# Patient Record
Sex: MALE | Race: WHITE | NOT HISPANIC OR LATINO | Employment: PART TIME | URBAN - METROPOLITAN AREA
[De-identification: names, ages, dates, MRNs, and addresses within clinical notes are randomized per-mention and may not be internally consistent; named-entity substitution may affect disease eponyms.]

---

## 2017-01-09 ENCOUNTER — APPOINTMENT (OUTPATIENT)
Dept: URGENT CARE | Age: 42
End: 2017-01-09

## 2017-08-23 ENCOUNTER — TRANSCRIBE ORDERS (OUTPATIENT)
Dept: LAB | Facility: CLINIC | Age: 42
End: 2017-08-23

## 2017-08-23 ENCOUNTER — APPOINTMENT (OUTPATIENT)
Dept: LAB | Facility: CLINIC | Age: 42
End: 2017-08-23
Payer: COMMERCIAL

## 2017-08-23 DIAGNOSIS — Z00.8 HEALTH EXAMINATION IN POPULATION SURVEY: Primary | ICD-10-CM

## 2017-08-23 DIAGNOSIS — Z00.8 HEALTH EXAMINATION IN POPULATION SURVEY: ICD-10-CM

## 2017-08-23 LAB
CHOLEST SERPL-MCNC: 135 MG/DL (ref 50–200)
EST. AVERAGE GLUCOSE BLD GHB EST-MCNC: 111 MG/DL
HBA1C MFR BLD: 5.5 % (ref 4.2–6.3)
HDLC SERPL-MCNC: 26 MG/DL (ref 40–60)
LDLC SERPL CALC-MCNC: 57 MG/DL (ref 0–100)
TRIGL SERPL-MCNC: 259 MG/DL

## 2017-08-23 PROCEDURE — 83036 HEMOGLOBIN GLYCOSYLATED A1C: CPT

## 2017-08-23 PROCEDURE — 36415 COLL VENOUS BLD VENIPUNCTURE: CPT

## 2017-08-23 PROCEDURE — 80061 LIPID PANEL: CPT

## 2018-03-02 ENCOUNTER — TRANSCRIBE ORDERS (OUTPATIENT)
Dept: RADIOLOGY | Facility: HOSPITAL | Age: 43
End: 2018-03-02

## 2018-03-02 ENCOUNTER — TRANSCRIBE ORDERS (OUTPATIENT)
Dept: LAB | Facility: HOSPITAL | Age: 43
End: 2018-03-02

## 2018-03-02 ENCOUNTER — APPOINTMENT (OUTPATIENT)
Dept: LAB | Facility: HOSPITAL | Age: 43
End: 2018-03-02
Payer: COMMERCIAL

## 2018-03-02 ENCOUNTER — HOSPITAL ENCOUNTER (OUTPATIENT)
Dept: RADIOLOGY | Facility: HOSPITAL | Age: 43
Discharge: HOME | End: 2018-03-02
Payer: COMMERCIAL

## 2018-03-02 DIAGNOSIS — I10 ESSENTIAL HYPERTENSION, MALIGNANT: ICD-10-CM

## 2018-03-02 DIAGNOSIS — R05.9 COUGH: Primary | ICD-10-CM

## 2018-03-02 DIAGNOSIS — J40 BRONCHITIS: ICD-10-CM

## 2018-03-02 DIAGNOSIS — I10 ESSENTIAL HYPERTENSION, MALIGNANT: Primary | ICD-10-CM

## 2018-03-02 DIAGNOSIS — E78.00 PURE HYPERCHOLESTEROLEMIA: ICD-10-CM

## 2018-03-02 LAB
ALBUMIN SERPL BCP-MCNC: 3.9 G/DL (ref 3.5–5)
ALP SERPL-CCNC: 84 U/L (ref 46–116)
ALT SERPL W P-5'-P-CCNC: 29 U/L (ref 12–78)
ANION GAP SERPL CALCULATED.3IONS-SCNC: 7 MMOL/L (ref 4–13)
AST SERPL W P-5'-P-CCNC: 15 U/L (ref 5–45)
BASOPHILS # BLD AUTO: 0.03 THOUSANDS/ΜL (ref 0–0.1)
BASOPHILS NFR BLD AUTO: 1 % (ref 0–1)
BILIRUB SERPL-MCNC: 0.37 MG/DL (ref 0.2–1)
BILIRUB UR QL STRIP: NEGATIVE
BUN SERPL-MCNC: 16 MG/DL (ref 5–25)
CALCIUM SERPL-MCNC: 9 MG/DL (ref 8.3–10.1)
CHLORIDE SERPL-SCNC: 107 MMOL/L (ref 100–108)
CHOLEST SERPL-MCNC: 170 MG/DL (ref 50–200)
CLARITY UR: CLEAR
CO2 SERPL-SCNC: 28 MMOL/L (ref 21–32)
COLOR UR: YELLOW
CREAT SERPL-MCNC: 1.04 MG/DL (ref 0.6–1.3)
EOSINOPHIL # BLD AUTO: 0.33 THOUSAND/ΜL (ref 0–0.61)
EOSINOPHIL NFR BLD AUTO: 5 % (ref 0–6)
ERYTHROCYTE [DISTWIDTH] IN BLOOD BY AUTOMATED COUNT: 12.3 % (ref 11.6–15.1)
ERYTHROCYTE [SEDIMENTATION RATE] IN BLOOD: 13 MM/HOUR (ref 0–10)
GFR SERPL CREATININE-BSD FRML MDRD: 89 ML/MIN/1.73SQ M
GLUCOSE P FAST SERPL-MCNC: 90 MG/DL (ref 65–99)
GLUCOSE UR STRIP-MCNC: NEGATIVE MG/DL
HCT VFR BLD AUTO: 40.9 % (ref 36.5–49.3)
HDLC SERPL-MCNC: 27 MG/DL (ref 40–60)
HGB BLD-MCNC: 14.5 G/DL (ref 12–17)
HGB UR QL STRIP.AUTO: NEGATIVE
KETONES UR STRIP-MCNC: NEGATIVE MG/DL
LDH SERPL-CCNC: 185 U/L (ref 81–234)
LDLC SERPL CALC-MCNC: 93 MG/DL (ref 0–100)
LEUKOCYTE ESTERASE UR QL STRIP: NEGATIVE
LYMPHOCYTES # BLD AUTO: 2.36 THOUSANDS/ΜL (ref 0.6–4.47)
LYMPHOCYTES NFR BLD AUTO: 39 % (ref 14–44)
MCH RBC QN AUTO: 31.3 PG (ref 26.8–34.3)
MCHC RBC AUTO-ENTMCNC: 35.5 G/DL (ref 31.4–37.4)
MCV RBC AUTO: 88 FL (ref 82–98)
MONOCYTES # BLD AUTO: 0.56 THOUSAND/ΜL (ref 0.17–1.22)
MONOCYTES NFR BLD AUTO: 9 % (ref 4–12)
NEUTROPHILS # BLD AUTO: 2.81 THOUSANDS/ΜL (ref 1.85–7.62)
NEUTS SEG NFR BLD AUTO: 46 % (ref 43–75)
NITRITE UR QL STRIP: NEGATIVE
NRBC BLD AUTO-RTO: 0 /100 WBCS
PH UR STRIP.AUTO: 7 [PH] (ref 4.5–8)
PLATELET # BLD AUTO: 246 THOUSANDS/UL (ref 149–390)
PMV BLD AUTO: 10 FL (ref 8.9–12.7)
POTASSIUM SERPL-SCNC: 4.1 MMOL/L (ref 3.5–5.3)
PROT SERPL-MCNC: 7.7 G/DL (ref 6.4–8.2)
PROT UR STRIP-MCNC: NEGATIVE MG/DL
PSA SERPL-MCNC: 0.2 NG/ML (ref 0–4)
RBC # BLD AUTO: 4.63 MILLION/UL (ref 3.88–5.62)
SODIUM SERPL-SCNC: 142 MMOL/L (ref 136–145)
SP GR UR STRIP.AUTO: 1.02 (ref 1–1.03)
TRIGL SERPL-MCNC: 248 MG/DL
TSH SERPL DL<=0.05 MIU/L-ACNC: 2.72 UIU/ML (ref 0.36–3.74)
UROBILINOGEN UR QL STRIP.AUTO: 0.2 E.U./DL
WBC # BLD AUTO: 6.11 THOUSAND/UL (ref 4.31–10.16)

## 2018-03-02 PROCEDURE — 85025 COMPLETE CBC W/AUTO DIFF WBC: CPT

## 2018-03-02 PROCEDURE — 86618 LYME DISEASE ANTIBODY: CPT

## 2018-03-02 PROCEDURE — 80061 LIPID PANEL: CPT

## 2018-03-02 PROCEDURE — 80053 COMPREHEN METABOLIC PANEL: CPT

## 2018-03-02 PROCEDURE — 81003 URINALYSIS AUTO W/O SCOPE: CPT

## 2018-03-02 PROCEDURE — 71046 X-RAY EXAM CHEST 2 VIEWS: CPT

## 2018-03-02 PROCEDURE — 83615 LACTATE (LD) (LDH) ENZYME: CPT

## 2018-03-02 PROCEDURE — 36415 COLL VENOUS BLD VENIPUNCTURE: CPT

## 2018-03-02 PROCEDURE — 85652 RBC SED RATE AUTOMATED: CPT

## 2018-03-02 PROCEDURE — 84443 ASSAY THYROID STIM HORMONE: CPT

## 2018-03-02 PROCEDURE — G0103 PSA SCREENING: HCPCS

## 2018-03-03 ENCOUNTER — TRANSCRIBE ORDERS (OUTPATIENT)
Dept: ADMINISTRATIVE | Facility: HOSPITAL | Age: 43
End: 2018-03-03

## 2018-03-03 DIAGNOSIS — R07.9 CHEST PAIN, UNSPECIFIED TYPE: Primary | ICD-10-CM

## 2018-03-05 LAB
B BURGDOR IGG SER IA-ACNC: 0.22
B BURGDOR IGM SER IA-ACNC: 0.2

## 2018-03-15 ENCOUNTER — HOSPITAL ENCOUNTER (OUTPATIENT)
Dept: NON INVASIVE DIAGNOSTICS | Facility: HOSPITAL | Age: 43
Discharge: HOME | End: 2018-03-15
Payer: COMMERCIAL

## 2018-03-15 DIAGNOSIS — R07.9 CHEST PAIN, UNSPECIFIED TYPE: ICD-10-CM

## 2018-03-15 LAB
ARRHY DURING EX: NORMAL
CHEST PAIN STATEMENT: NORMAL
MAX DIASTOLIC BP: 58 MMHG
MAX HEART RATE: 190 BPM
MAX PREDICTED HEART RATE: 180 BPM
MAX. SYSTOLIC BP: 210 MMHG
PROTOCOL NAME: NORMAL
REASON FOR TERMINATION: NORMAL
TARGET HR FORMULA: NORMAL
TEST INDICATION: NORMAL
TIME IN EXERCISE PHASE: NORMAL

## 2018-03-15 PROCEDURE — 93351 STRESS TTE COMPLETE: CPT | Performed by: INTERNAL MEDICINE

## 2018-03-15 PROCEDURE — 93350 STRESS TTE ONLY: CPT

## 2018-08-23 ENCOUNTER — APPOINTMENT (OUTPATIENT)
Dept: LAB | Facility: CLINIC | Age: 43
End: 2018-08-23

## 2018-08-23 ENCOUNTER — TRANSCRIBE ORDERS (OUTPATIENT)
Dept: LAB | Facility: CLINIC | Age: 43
End: 2018-08-23

## 2018-08-23 DIAGNOSIS — Z00.8 HEALTH EXAMINATION IN POPULATION SURVEY: ICD-10-CM

## 2018-08-23 DIAGNOSIS — Z00.8 HEALTH EXAMINATION IN POPULATION SURVEY: Primary | ICD-10-CM

## 2018-08-23 LAB
CHOLEST SERPL-MCNC: 162 MG/DL (ref 50–200)
EST. AVERAGE GLUCOSE BLD GHB EST-MCNC: 114 MG/DL
HBA1C MFR BLD: 5.6 % (ref 4.2–6.3)
HDLC SERPL-MCNC: 27 MG/DL (ref 40–60)
LDLC SERPL CALC-MCNC: 63 MG/DL (ref 0–100)
NONHDLC SERPL-MCNC: 135 MG/DL
TRIGL SERPL-MCNC: 358 MG/DL

## 2018-08-23 PROCEDURE — 83036 HEMOGLOBIN GLYCOSYLATED A1C: CPT | Performed by: PREVENTIVE MEDICINE

## 2018-08-23 PROCEDURE — 80061 LIPID PANEL: CPT

## 2018-08-23 PROCEDURE — 36415 COLL VENOUS BLD VENIPUNCTURE: CPT | Performed by: PREVENTIVE MEDICINE

## 2019-11-21 ENCOUNTER — APPOINTMENT (EMERGENCY)
Dept: RADIOLOGY | Facility: HOSPITAL | Age: 44
End: 2019-11-21
Payer: COMMERCIAL

## 2019-11-21 ENCOUNTER — HOSPITAL ENCOUNTER (EMERGENCY)
Facility: HOSPITAL | Age: 44
Discharge: HOME | End: 2019-11-21
Attending: EMERGENCY MEDICINE | Admitting: EMERGENCY MEDICINE
Payer: COMMERCIAL

## 2019-11-21 VITALS
RESPIRATION RATE: 18 BRPM | DIASTOLIC BLOOD PRESSURE: 101 MMHG | SYSTOLIC BLOOD PRESSURE: 160 MMHG | WEIGHT: 221.78 LBS | BODY MASS INDEX: 36.35 KG/M2 | HEART RATE: 82 BPM | TEMPERATURE: 98.4 F | OXYGEN SATURATION: 97 %

## 2019-11-21 DIAGNOSIS — R07.9 CHEST PAIN: Primary | ICD-10-CM

## 2019-11-21 LAB
ANION GAP SERPL CALCULATED.3IONS-SCNC: 6 MMOL/L (ref 4–13)
ATRIAL RATE: 81 BPM
BASOPHILS # BLD AUTO: 0.06 THOUSANDS/ΜL (ref 0–0.1)
BASOPHILS NFR BLD AUTO: 1 % (ref 0–1)
BUN SERPL-MCNC: 19 MG/DL (ref 5–25)
CALCIUM SERPL-MCNC: 9.4 MG/DL (ref 8.3–10.1)
CHLORIDE SERPL-SCNC: 106 MMOL/L (ref 100–108)
CO2 SERPL-SCNC: 27 MMOL/L (ref 21–32)
CREAT SERPL-MCNC: 1.2 MG/DL (ref 0.6–1.3)
EOSINOPHIL # BLD AUTO: 0.33 THOUSAND/ΜL (ref 0–0.61)
EOSINOPHIL NFR BLD AUTO: 4 % (ref 0–6)
ERYTHROCYTE [DISTWIDTH] IN BLOOD BY AUTOMATED COUNT: 11.9 % (ref 11.6–15.1)
GFR SERPL CREATININE-BSD FRML MDRD: 74 ML/MIN/1.73SQ M
GLUCOSE SERPL-MCNC: 104 MG/DL (ref 65–140)
HCT VFR BLD AUTO: 40.8 % (ref 36.5–49.3)
HGB BLD-MCNC: 13.8 G/DL (ref 12–17)
IMM GRANULOCYTES # BLD AUTO: 0.02 THOUSAND/UL (ref 0–0.2)
IMM GRANULOCYTES NFR BLD AUTO: 0 % (ref 0–2)
LYMPHOCYTES # BLD AUTO: 3.06 THOUSANDS/ΜL (ref 0.6–4.47)
LYMPHOCYTES NFR BLD AUTO: 37 % (ref 14–44)
MCH RBC QN AUTO: 30.4 PG (ref 26.8–34.3)
MCHC RBC AUTO-ENTMCNC: 33.8 G/DL (ref 31.4–37.4)
MCV RBC AUTO: 90 FL (ref 82–98)
MONOCYTES # BLD AUTO: 0.64 THOUSAND/ΜL (ref 0.17–1.22)
MONOCYTES NFR BLD AUTO: 8 % (ref 4–12)
NEUTROPHILS # BLD AUTO: 4.28 THOUSANDS/ΜL (ref 1.85–7.62)
NEUTS SEG NFR BLD AUTO: 50 % (ref 43–75)
NRBC BLD AUTO-RTO: 0 /100 WBCS
P AXIS: 44 DEGREES
PLATELET # BLD AUTO: 232 THOUSANDS/UL (ref 149–390)
PMV BLD AUTO: 10.7 FL (ref 8.9–12.7)
POTASSIUM SERPL-SCNC: 3.8 MMOL/L (ref 3.5–5.3)
PR INTERVAL: 168 MS
QRS AXIS: 24 DEGREES
QRSD INTERVAL: 98 MS
QT INTERVAL: 354 MS
QTC INTERVAL: 411 MS
RBC # BLD AUTO: 4.54 MILLION/UL (ref 3.88–5.62)
SODIUM SERPL-SCNC: 139 MMOL/L (ref 136–145)
T WAVE AXIS: 40 DEGREES
TROPONIN I SERPL-MCNC: <0.02 NG/ML
TROPONIN I SERPL-MCNC: <0.02 NG/ML
VENTRICULAR RATE: 81 BPM
WBC # BLD AUTO: 8.39 THOUSAND/UL (ref 4.31–10.16)

## 2019-11-21 PROCEDURE — 93005 ELECTROCARDIOGRAM TRACING: CPT

## 2019-11-21 PROCEDURE — 80048 BASIC METABOLIC PNL TOTAL CA: CPT | Performed by: EMERGENCY MEDICINE

## 2019-11-21 PROCEDURE — 93010 ELECTROCARDIOGRAM REPORT: CPT | Performed by: INTERNAL MEDICINE

## 2019-11-21 PROCEDURE — 36415 COLL VENOUS BLD VENIPUNCTURE: CPT | Performed by: EMERGENCY MEDICINE

## 2019-11-21 PROCEDURE — 99285 EMERGENCY DEPT VISIT HI MDM: CPT | Performed by: EMERGENCY MEDICINE

## 2019-11-21 PROCEDURE — 85025 COMPLETE CBC W/AUTO DIFF WBC: CPT | Performed by: EMERGENCY MEDICINE

## 2019-11-21 PROCEDURE — 84484 ASSAY OF TROPONIN QUANT: CPT | Performed by: EMERGENCY MEDICINE

## 2019-11-21 PROCEDURE — 71046 X-RAY EXAM CHEST 2 VIEWS: CPT

## 2019-11-21 PROCEDURE — 99285 EMERGENCY DEPT VISIT HI MDM: CPT

## 2019-11-21 RX ORDER — SODIUM CHLORIDE 9 MG/ML
3 INJECTION INTRAVENOUS AS NEEDED
Status: DISCONTINUED | OUTPATIENT
Start: 2019-11-21 | End: 2019-11-22 | Stop reason: HOSPADM

## 2019-11-21 RX ORDER — ACETAMINOPHEN 325 MG/1
650 TABLET ORAL EVERY 6 HOURS PRN
COMMUNITY
End: 2019-11-21

## 2019-11-21 RX ORDER — IBUPROFEN 200 MG
600 TABLET ORAL EVERY 6 HOURS PRN
COMMUNITY
End: 2019-11-21

## 2019-11-22 LAB
ATRIAL RATE: 96 BPM
P AXIS: 57 DEGREES
PR INTERVAL: 152 MS
QRS AXIS: 30 DEGREES
QRSD INTERVAL: 100 MS
QT INTERVAL: 332 MS
QTC INTERVAL: 419 MS
T WAVE AXIS: 48 DEGREES
VENTRICULAR RATE: 96 BPM

## 2019-11-22 PROCEDURE — 93010 ELECTROCARDIOGRAM REPORT: CPT | Performed by: INTERNAL MEDICINE

## 2019-11-23 ENCOUNTER — APPOINTMENT (OUTPATIENT)
Dept: LAB | Facility: CLINIC | Age: 44
End: 2019-11-23
Payer: COMMERCIAL

## 2019-11-23 ENCOUNTER — TRANSCRIBE ORDERS (OUTPATIENT)
Dept: LAB | Facility: CLINIC | Age: 44
End: 2019-11-23

## 2019-11-23 DIAGNOSIS — E78.2 MIXED HYPERLIPIDEMIA: ICD-10-CM

## 2019-11-23 DIAGNOSIS — I10 ESSENTIAL HYPERTENSION, MALIGNANT: ICD-10-CM

## 2019-11-23 DIAGNOSIS — N40.0 BENIGN PROSTATIC HYPERPLASIA, UNSPECIFIED WHETHER LOWER URINARY TRACT SYMPTOMS PRESENT: ICD-10-CM

## 2019-11-23 DIAGNOSIS — I10 ESSENTIAL HYPERTENSION, MALIGNANT: Primary | ICD-10-CM

## 2019-11-23 LAB
ALBUMIN SERPL BCP-MCNC: 3.9 G/DL (ref 3.5–5)
ALP SERPL-CCNC: 80 U/L (ref 46–116)
ALT SERPL W P-5'-P-CCNC: 24 U/L (ref 12–78)
ANION GAP SERPL CALCULATED.3IONS-SCNC: 9 MMOL/L (ref 4–13)
AST SERPL W P-5'-P-CCNC: 18 U/L (ref 5–45)
BILIRUB SERPL-MCNC: 0.37 MG/DL (ref 0.2–1)
BILIRUB UR QL STRIP: NEGATIVE
BUN SERPL-MCNC: 21 MG/DL (ref 5–25)
CALCIUM SERPL-MCNC: 8.9 MG/DL (ref 8.3–10.1)
CHLORIDE SERPL-SCNC: 107 MMOL/L (ref 100–108)
CHOLEST SERPL-MCNC: 161 MG/DL (ref 50–200)
CLARITY UR: CLEAR
CO2 SERPL-SCNC: 29 MMOL/L (ref 21–32)
COLOR UR: YELLOW
CREAT SERPL-MCNC: 1.15 MG/DL (ref 0.6–1.3)
ERYTHROCYTE [SEDIMENTATION RATE] IN BLOOD: 7 MM/HOUR (ref 0–10)
GFR SERPL CREATININE-BSD FRML MDRD: 78 ML/MIN/1.73SQ M
GLUCOSE P FAST SERPL-MCNC: 100 MG/DL (ref 65–99)
GLUCOSE UR STRIP-MCNC: NEGATIVE MG/DL
HDLC SERPL-MCNC: 29 MG/DL
HGB UR QL STRIP.AUTO: NEGATIVE
KETONES UR STRIP-MCNC: NEGATIVE MG/DL
LDLC SERPL CALC-MCNC: 85 MG/DL (ref 0–100)
LEUKOCYTE ESTERASE UR QL STRIP: NEGATIVE
NITRITE UR QL STRIP: NEGATIVE
NONHDLC SERPL-MCNC: 132 MG/DL
PH UR STRIP.AUTO: 6 [PH]
POTASSIUM SERPL-SCNC: 4.9 MMOL/L (ref 3.5–5.3)
PROT SERPL-MCNC: 7.3 G/DL (ref 6.4–8.2)
PROT UR STRIP-MCNC: NEGATIVE MG/DL
PSA SERPL-MCNC: 0.3 NG/ML (ref 0–4)
SODIUM SERPL-SCNC: 145 MMOL/L (ref 136–145)
SP GR UR STRIP.AUTO: 1.02 (ref 1–1.03)
TRIGL SERPL-MCNC: 235 MG/DL
TSH SERPL DL<=0.05 MIU/L-ACNC: 1.25 UIU/ML (ref 0.36–3.74)
UROBILINOGEN UR QL STRIP.AUTO: 0.2 E.U./DL

## 2019-11-23 PROCEDURE — 84153 ASSAY OF PSA TOTAL: CPT

## 2019-11-23 PROCEDURE — 84443 ASSAY THYROID STIM HORMONE: CPT

## 2019-11-23 PROCEDURE — 36415 COLL VENOUS BLD VENIPUNCTURE: CPT | Performed by: INTERNAL MEDICINE

## 2019-11-23 PROCEDURE — 80061 LIPID PANEL: CPT

## 2019-11-23 PROCEDURE — 81003 URINALYSIS AUTO W/O SCOPE: CPT | Performed by: INTERNAL MEDICINE

## 2019-11-23 PROCEDURE — 80053 COMPREHEN METABOLIC PANEL: CPT

## 2019-11-23 PROCEDURE — 85652 RBC SED RATE AUTOMATED: CPT | Performed by: INTERNAL MEDICINE

## 2019-11-25 ENCOUNTER — TRANSCRIBE ORDERS (OUTPATIENT)
Dept: ADMINISTRATIVE | Facility: HOSPITAL | Age: 44
End: 2019-11-25

## 2019-11-25 DIAGNOSIS — R07.9 CHEST PAIN, UNSPECIFIED TYPE: Primary | ICD-10-CM

## 2019-11-25 NOTE — ED ATTENDING ATTESTATION
11/21/2019  Graeme ARSHAD DO, saw and evaluated the patient  I have discussed the patient with the resident/non-physician practitioner and agree with the resident's/non-physician practitioner's findings, Plan of Care, and MDM as documented in the resident's/non-physician practitioner's note, except where noted  All available labs and Radiology studies were reviewed  I was present for key portions of any procedure(s) performed by the resident/non-physician practitioner and I was immediately available to provide assistance  At this point I agree with the current assessment done in the Emergency Department  I have conducted an independent evaluation of this patient a history and physical is as follows:    59-year-old male with a history of chest pain and negative stress test earlier this year presents for chest pain  Has been going on for a few hours  Waxing waning  Feels somewhat anxious  Does not radiate  EKG is normal   Patient somewhat hypertensive, 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
Statement Selected

## 2019-11-29 ENCOUNTER — HOSPITAL ENCOUNTER (OUTPATIENT)
Dept: NON INVASIVE DIAGNOSTICS | Facility: CLINIC | Age: 44
Discharge: HOME | End: 2019-11-29
Payer: COMMERCIAL

## 2019-11-29 DIAGNOSIS — R07.9 CHEST PAIN, UNSPECIFIED TYPE: ICD-10-CM

## 2019-11-29 LAB
CHEST PAIN STATEMENT: NORMAL
MAX DIASTOLIC BP: 108 MMHG
MAX HEART RATE: 144 BPM
MAX PREDICTED HEART RATE: 178 BPM
MAX. SYSTOLIC BP: 184 MMHG
PROTOCOL NAME: NORMAL
REASON FOR TERMINATION: NORMAL
TARGET HR FORMULA: NORMAL
TEST INDICATION: NORMAL
TIME IN EXERCISE PHASE: NORMAL

## 2019-11-29 PROCEDURE — 93018 CV STRESS TEST I&R ONLY: CPT | Performed by: INTERNAL MEDICINE

## 2019-11-29 PROCEDURE — 78452 HT MUSCLE IMAGE SPECT MULT: CPT | Performed by: INTERNAL MEDICINE

## 2019-11-29 PROCEDURE — A9502 TC99M TETROFOSMIN: HCPCS

## 2019-11-29 PROCEDURE — 93017 CV STRESS TEST TRACING ONLY: CPT

## 2019-11-29 PROCEDURE — 93016 CV STRESS TEST SUPVJ ONLY: CPT | Performed by: INTERNAL MEDICINE

## 2019-11-29 PROCEDURE — 78452 HT MUSCLE IMAGE SPECT MULT: CPT

## 2019-11-29 RX ADMIN — REGADENOSON 0.4 MG: 0.08 INJECTION, SOLUTION INTRAVENOUS at 09:50

## 2019-12-03 ENCOUNTER — TELEPHONE (OUTPATIENT)
Dept: CARDIOLOGY CLINIC | Facility: CLINIC | Age: 44
End: 2019-12-03

## 2019-12-03 ENCOUNTER — OFFICE VISIT (OUTPATIENT)
Dept: CARDIOLOGY CLINIC | Facility: CLINIC | Age: 44
End: 2019-12-03
Payer: COMMERCIAL

## 2019-12-03 VITALS
HEIGHT: 65 IN | SYSTOLIC BLOOD PRESSURE: 148 MMHG | WEIGHT: 220.3 LBS | DIASTOLIC BLOOD PRESSURE: 102 MMHG | HEART RATE: 80 BPM | BODY MASS INDEX: 36.71 KG/M2

## 2019-12-03 DIAGNOSIS — R07.9 CHEST PAIN, UNSPECIFIED TYPE: ICD-10-CM

## 2019-12-03 DIAGNOSIS — I10 ESSENTIAL (PRIMARY) HYPERTENSION: ICD-10-CM

## 2019-12-03 DIAGNOSIS — E78.5 HYPERLIPIDEMIA, UNSPECIFIED HYPERLIPIDEMIA TYPE: ICD-10-CM

## 2019-12-03 DIAGNOSIS — R94.39 ABNORMAL NUCLEAR STRESS TEST: Primary | ICD-10-CM

## 2019-12-03 PROCEDURE — 93000 ELECTROCARDIOGRAM COMPLETE: CPT | Performed by: INTERNAL MEDICINE

## 2019-12-03 PROCEDURE — 99244 OFF/OP CNSLTJ NEW/EST MOD 40: CPT | Performed by: INTERNAL MEDICINE

## 2019-12-03 RX ORDER — ATORVASTATIN CALCIUM 40 MG/1
40 TABLET, FILM COATED ORAL DAILY
Qty: 90 TABLET | Refills: 3 | Status: SHIPPED | OUTPATIENT
Start: 2019-12-03

## 2019-12-03 RX ORDER — LOSARTAN POTASSIUM 50 MG/1
50 TABLET ORAL DAILY
Qty: 90 TABLET | Refills: 3 | Status: SHIPPED | OUTPATIENT
Start: 2019-12-03 | End: 2021-03-08 | Stop reason: SDUPTHER

## 2019-12-03 RX ORDER — METOPROLOL SUCCINATE 50 MG/1
50 TABLET, EXTENDED RELEASE ORAL DAILY
Refills: 1 | COMMUNITY
Start: 2019-11-23 | End: 2021-03-08 | Stop reason: SDUPTHER

## 2019-12-03 NOTE — TELEPHONE ENCOUNTER
Pt is scheduled on 12/09/19 for a LHC with Dr Lidia Eric at [facility]  Pt in office for instructions  Can I get preauth please

## 2019-12-03 NOTE — H&P (VIEW-ONLY)
Cardiology Consultation     Jayy Cedeño  [id number]  1975  [facility]ART & VASCULAR [facility] [place]  [address] [facility] [address]  1  Abnormal nuclear stress test  Cardiac catheterization    atorvastatin (LIPITOR) 40 mg tablet    POCT ECG    losartan (COZAAR) 50 mg tablet    Echo complete with contrast if indicated   2  Essential (primary) hypertension  Cardiac catheterization    atorvastatin (LIPITOR) 40 mg tablet    losartan (COZAAR) 50 mg tablet    Echo complete with contrast if indicated   3  Chest pain, unspecified type  Cardiac catheterization    atorvastatin (LIPITOR) 40 mg tablet    losartan (COZAAR) 50 mg tablet    Echo complete with contrast if indicated   4  Hyperlipidemia, unspecified hyperlipidemia type  Cardiac catheterization    atorvastatin (LIPITOR) 40 mg tablet    losartan (COZAAR) 50 mg tablet    Echo complete with contrast if indicated     There is no problem list on file for this patient  HPI patient is here for a cardiac assessment  He was seen in the emergency room November 21, 2019 with chest discomfort  The discomfort was localized to the left anterior chest squeezing and quality and at that time 8/10 severity  He had a workup and was eventually discharged for an outpatient stress test   He had a lipid profile done November 23, 2019 which demonstrated total cholesterol of 161 with a triglyceride level of 235  HDL was 29 and direct LDL was 85  Patient had a pharmacologic stress test done November 29th which demonstrated no EKG change wrist symptoms but the nuclear images suggested ischemia of the entire inferior segment of the left ventricle    EKG in the emergency room November 21st demonstrated sinus rhythm and appeared normal   Interestingly the patient did have a stress echo done last year for chest discomfort which was unremarkable but he tells me his chest discomfort is more significant and frequent since that time  We did do an EKG today which demonstrates sinus rhythm and is a normal tracing  His blood pressure is also been elevated and his primary care physician recently placed him on metoprolol succinate 50 mg per day  We will likely add additional therapy  His father is in attendance today  There is a family history of heart disease in reference to a maternal grandmother who had bypass surgery in her 76s  A maternal uncle also had CABG  Maternal grandfather  from heart attack  His father's father and grandfather both had coronary artery bypass graft surgery  PMH-  History reviewed  No pertinent past medical history       SOCIAL HISTORY-  Social History     Socioeconomic History    Marital status: Single     Spouse name: Not on file    Number of children: Not on file    Years of education: Not on file    Highest education level: Not on file   Occupational History    Not on file   Social Needs    Financial resource strain: Not on file    Food insecurity:     Worry: Not on file     Inability: Not on file    Transportation needs:     Medical: Not on file     Non-medical: Not on file   Tobacco Use    Smoking status: Not on file   Substance and Sexual Activity    Alcohol use: Not on file    Drug use: Not on file    Sexual activity: Not on file   Lifestyle    Physical activity:     Days per week: Not on file     Minutes per session: Not on file    Stress: Not on file   Relationships    Social connections:     Talks on phone: Not on file     Gets together: Not on file     Attends Zoroastrianism service: Not on file     Active member of club or organization: Not on file     Attends meetings of clubs or organizations: Not on file     Relationship status: Not on file    Intimate partner violence:     Fear of current or ex partner: Not on file     Emotionally abused: Not on file     Physically abused: Not on file     Forced sexual activity: Not on file   Other Topics Concern    Not on file   Social History Narrative    Not on file        FAMILY HISTORY-  History reviewed  No pertinent family history  SURGICAL HISTORY-  History reviewed  No pertinent surgical history  Current Outpatient Medications:     metoprolol succinate (TOPROL-XL) 50 mg 24 hr tablet, Take 50 mg by mouth daily, Disp: , Rfl: 1    atorvastatin (LIPITOR) 40 mg tablet, Take 1 tablet (40 mg total) by mouth daily, Disp: 90 tablet, Rfl: 3    losartan (COZAAR) 50 mg tablet, Take 1 tablet (50 mg total) by mouth daily, Disp: 90 tablet, Rfl: 3  No Known Allergies  Vitals:    12/03/19 1010   BP: (!) 148/102   BP Location: Right arm   Patient Position: Sitting   Cuff Size: Large   Pulse: 80   Weight: 99 9 kg (220 lb 4 8 oz)   Height: 5' 5" (1 651 m)         Review of Systems:  Review of Systems   Respiratory: Positive for shortness of breath  Cardiovascular: Positive for chest pain  All other systems reviewed and are negative  Physical Exam:  Physical Exam   Constitutional: He is oriented to person, place, and time  He appears well-developed and well-nourished  HENT:   Head: Normocephalic and atraumatic  Eyes: Pupils are equal, round, and reactive to light  Conjunctivae are normal    Neck: Normal range of motion  Neck supple  Cardiovascular: Normal rate and normal heart sounds  Pulmonary/Chest: Effort normal and breath sounds normal    Neurological: He is alert and oriented to person, place, and time  Skin: Skin is warm and dry  Psychiatric: He has a normal mood and affect  Vitals reviewed  Discussion/Summary:  Will add atorvastatin 40 mg per day and losartan 50 mg per day to his medical regimen  I have asked him to start a baby aspirin  Given his symptoms and abnormal stress test will proceed with cardiac catheterization  Patient accepts and is willing to proceed  I have asked him to call in the interim if he has a problem

## 2019-12-03 NOTE — PROGRESS NOTES
Cardiology Consultation     Yahaira Miguel  [id number]  1975  HEART & VASCULAR [facility] [place]  [address] [facility] [address]  1  Abnormal nuclear stress test  Cardiac catheterization    atorvastatin (LIPITOR) 40 mg tablet    POCT ECG    losartan (COZAAR) 50 mg tablet    Echo complete with contrast if indicated   2  Essential (primary) hypertension  Cardiac catheterization    atorvastatin (LIPITOR) 40 mg tablet    losartan (COZAAR) 50 mg tablet    Echo complete with contrast if indicated   3  Chest pain, unspecified type  Cardiac catheterization    atorvastatin (LIPITOR) 40 mg tablet    losartan (COZAAR) 50 mg tablet    Echo complete with contrast if indicated   4  Hyperlipidemia, unspecified hyperlipidemia type  Cardiac catheterization    atorvastatin (LIPITOR) 40 mg tablet    losartan (COZAAR) 50 mg tablet    Echo complete with contrast if indicated     There is no problem list on file for this patient  HPI patient is here for a cardiac assessment  He was seen in the emergency room November 21, 2019 with chest discomfort  The discomfort was localized to the left anterior chest squeezing and quality and at that time 8/10 severity  He had a workup and was eventually discharged for an outpatient stress test   He had a lipid profile done November 23, 2019 which demonstrated total cholesterol of 161 with a triglyceride level of 235  HDL was 29 and direct LDL was 85  Patient had a pharmacologic stress test done November 29th which demonstrated no EKG change wrist symptoms but the nuclear images suggested ischemia of the entire inferior segment of the left ventricle    EKG in the emergency room November 21st demonstrated sinus rhythm and appeared normal   Interestingly the patient did have a stress echo done last year for chest discomfort which was unremarkable but he tells me his chest discomfort is more significant and frequent since that time  We did do an EKG today which demonstrates sinus rhythm and is a normal tracing  His blood pressure is also been elevated and his primary care physician recently placed him on metoprolol succinate 50 mg per day  We will likely add additional therapy  His father is in attendance today  There is a family history of heart disease in reference to a maternal grandmother who had bypass surgery in her 76s  A maternal uncle also had CABG  Maternal grandfather  from heart attack  His father's father and grandfather both had coronary artery bypass graft surgery  PMH-  History reviewed  No pertinent past medical history       SOCIAL HISTORY-  Social History     Socioeconomic History    Marital status: Single     Spouse name: Not on file    Number of children: Not on file    Years of education: Not on file    Highest education level: Not on file   Occupational History    Not on file   Social Needs    Financial resource strain: Not on file    Food insecurity:     Worry: Not on file     Inability: Not on file    Transportation needs:     Medical: Not on file     Non-medical: Not on file   Tobacco Use    Smoking status: Not on file   Substance and Sexual Activity    Alcohol use: Not on file    Drug use: Not on file    Sexual activity: Not on file   Lifestyle    Physical activity:     Days per week: Not on file     Minutes per session: Not on file    Stress: Not on file   Relationships    Social connections:     Talks on phone: Not on file     Gets together: Not on file     Attends Taoism service: Not on file     Active member of club or organization: Not on file     Attends meetings of clubs or organizations: Not on file     Relationship status: Not on file    Intimate partner violence:     Fear of current or ex partner: Not on file     Emotionally abused: Not on file     Physically abused: Not on file     Forced sexual activity: Not on file   Other Topics Concern    Not on file   Social History Narrative    Not on file        FAMILY HISTORY-  History reviewed  No pertinent family history  SURGICAL HISTORY-  History reviewed  No pertinent surgical history  Current Outpatient Medications:     metoprolol succinate (TOPROL-XL) 50 mg 24 hr tablet, Take 50 mg by mouth daily, Disp: , Rfl: 1    atorvastatin (LIPITOR) 40 mg tablet, Take 1 tablet (40 mg total) by mouth daily, Disp: 90 tablet, Rfl: 3    losartan (COZAAR) 50 mg tablet, Take 1 tablet (50 mg total) by mouth daily, Disp: 90 tablet, Rfl: 3  No Known Allergies  Vitals:    12/03/19 1010   BP: (!) 148/102   BP Location: Right arm   Patient Position: Sitting   Cuff Size: Large   Pulse: 80   Weight: 99 9 kg (220 lb 4 8 oz)   Height: 5' 5" (1 651 m)         Review of Systems:  Review of Systems   Respiratory: Positive for shortness of breath  Cardiovascular: Positive for chest pain  All other systems reviewed and are negative  Physical Exam:  Physical Exam   Constitutional: He is oriented to person, place, and time  He appears well-developed and well-nourished  HENT:   Head: Normocephalic and atraumatic  Eyes: Pupils are equal, round, and reactive to light  Conjunctivae are normal    Neck: Normal range of motion  Neck supple  Cardiovascular: Normal rate and normal heart sounds  Pulmonary/Chest: Effort normal and breath sounds normal    Neurological: He is alert and oriented to person, place, and time  Skin: Skin is warm and dry  Psychiatric: He has a normal mood and affect  Vitals reviewed  Discussion/Summary:  Will add atorvastatin 40 mg per day and losartan 50 mg per day to his medical regimen  I have asked him to start a baby aspirin  Given his symptoms and abnormal stress test will proceed with cardiac catheterization  Patient accepts and is willing to proceed  I have asked him to call in the interim if he has a problem

## 2019-12-06 RX ORDER — SODIUM CHLORIDE 9 MG/ML
100 INJECTION, SOLUTION INTRAVENOUS CONTINUOUS
Status: CANCELLED | OUTPATIENT
Start: 2019-12-09

## 2019-12-06 RX ORDER — ASPIRIN 81 MG/1
324 TABLET, CHEWABLE ORAL ONCE
Status: CANCELLED | OUTPATIENT
Start: 2019-12-06 | End: 2019-12-06

## 2019-12-06 NOTE — TELEPHONE ENCOUNTER
He does not need auth for his Cath [address] on 12/9/19 at [address] [place] [address] per Francisco Malone at [facility] Ref# [id number]

## 2019-12-09 ENCOUNTER — HOSPITAL ENCOUNTER (OUTPATIENT)
Dept: NON INVASIVE DIAGNOSTICS | Facility: HOSPITAL | Age: 44
Discharge: HOME | End: 2019-12-09
Attending: INTERNAL MEDICINE | Admitting: INTERNAL MEDICINE
Payer: COMMERCIAL

## 2019-12-09 VITALS
SYSTOLIC BLOOD PRESSURE: 127 MMHG | DIASTOLIC BLOOD PRESSURE: 78 MMHG | WEIGHT: 217.38 LBS | OXYGEN SATURATION: 98 % | TEMPERATURE: 97.6 F | HEART RATE: 59 BPM | RESPIRATION RATE: 18 BRPM | HEIGHT: 65 IN | BODY MASS INDEX: 36.22 KG/M2

## 2019-12-09 DIAGNOSIS — R94.39 ABNORMAL NUCLEAR STRESS TEST: ICD-10-CM

## 2019-12-09 DIAGNOSIS — E78.5 HYPERLIPIDEMIA, UNSPECIFIED HYPERLIPIDEMIA TYPE: ICD-10-CM

## 2019-12-09 DIAGNOSIS — I10 ESSENTIAL (PRIMARY) HYPERTENSION: ICD-10-CM

## 2019-12-09 DIAGNOSIS — R07.9 CHEST PAIN, UNSPECIFIED TYPE: ICD-10-CM

## 2019-12-09 LAB
ERYTHROCYTE [DISTWIDTH] IN BLOOD BY AUTOMATED COUNT: 11.9 % (ref 11.6–15.1)
HCT VFR BLD AUTO: 42.1 % (ref 36.5–49.3)
HGB BLD-MCNC: 14.2 G/DL (ref 12–17)
INR PPP: 0.99 (ref 0.84–1.19)
MCH RBC QN AUTO: 30.3 PG (ref 26.8–34.3)
MCHC RBC AUTO-ENTMCNC: 33.7 G/DL (ref 31.4–37.4)
MCV RBC AUTO: 90 FL (ref 82–98)
PLATELET # BLD AUTO: 223 THOUSANDS/UL (ref 149–390)
PMV BLD AUTO: 10.4 FL (ref 8.9–12.7)
PROTHROMBIN TIME: 12.5 SECONDS (ref 11.6–14.5)
RBC # BLD AUTO: 4.68 MILLION/UL (ref 3.88–5.62)
WBC # BLD AUTO: 7.81 THOUSAND/UL (ref 4.31–10.16)

## 2019-12-09 PROCEDURE — 99152 MOD SED SAME PHYS/QHP 5/>YRS: CPT | Performed by: INTERNAL MEDICINE

## 2019-12-09 PROCEDURE — 85610 PROTHROMBIN TIME: CPT | Performed by: NURSE PRACTITIONER

## 2019-12-09 PROCEDURE — 93458 L HRT ARTERY/VENTRICLE ANGIO: CPT | Performed by: INTERNAL MEDICINE

## 2019-12-09 PROCEDURE — C1769 GUIDE WIRE: HCPCS | Performed by: INTERNAL MEDICINE

## 2019-12-09 PROCEDURE — 85027 COMPLETE CBC AUTOMATED: CPT | Performed by: NURSE PRACTITIONER

## 2019-12-09 PROCEDURE — C1894 INTRO/SHEATH, NON-LASER: HCPCS | Performed by: INTERNAL MEDICINE

## 2019-12-09 RX ORDER — FENTANYL CITRATE 50 UG/ML
INJECTION, SOLUTION INTRAMUSCULAR; INTRAVENOUS CODE/TRAUMA/SEDATION MEDICATION
Status: COMPLETED | OUTPATIENT
Start: 2019-12-09 | End: 2019-12-09

## 2019-12-09 RX ORDER — VERAPAMIL HYDROCHLORIDE 2.5 MG/ML
INJECTION, SOLUTION INTRAVENOUS CODE/TRAUMA/SEDATION MEDICATION
Status: COMPLETED | OUTPATIENT
Start: 2019-12-09 | End: 2019-12-09

## 2019-12-09 RX ORDER — HEPARIN SODIUM 1000 [USP'U]/ML
INJECTION, SOLUTION INTRAVENOUS; SUBCUTANEOUS CODE/TRAUMA/SEDATION MEDICATION
Status: COMPLETED | OUTPATIENT
Start: 2019-12-09 | End: 2019-12-09

## 2019-12-09 RX ORDER — NITROGLYCERIN 20 MG/100ML
INJECTION INTRAVENOUS CODE/TRAUMA/SEDATION MEDICATION
Status: COMPLETED | OUTPATIENT
Start: 2019-12-09 | End: 2019-12-09

## 2019-12-09 RX ORDER — MIDAZOLAM HYDROCHLORIDE 2 MG/2ML
INJECTION, SOLUTION INTRAMUSCULAR; INTRAVENOUS CODE/TRAUMA/SEDATION MEDICATION
Status: COMPLETED | OUTPATIENT
Start: 2019-12-09 | End: 2019-12-09

## 2019-12-09 RX ORDER — SODIUM CHLORIDE 9 MG/ML
100 INJECTION, SOLUTION INTRAVENOUS CONTINUOUS
Status: DISCONTINUED | OUTPATIENT
Start: 2019-12-09 | End: 2019-12-10 | Stop reason: HOSPADM

## 2019-12-09 RX ORDER — ASPIRIN 81 MG/1
81 TABLET ORAL DAILY
COMMUNITY
End: 2021-04-21 | Stop reason: HOSPADM

## 2019-12-09 RX ORDER — ASPIRIN 81 MG/1
324 TABLET, CHEWABLE ORAL ONCE
Status: COMPLETED | OUTPATIENT
Start: 2019-12-09 | End: 2019-12-09

## 2019-12-09 RX ORDER — SODIUM CHLORIDE 9 MG/ML
100 INJECTION, SOLUTION INTRAVENOUS CONTINUOUS
Status: DISPENSED | OUTPATIENT
Start: 2019-12-09 | End: 2019-12-09

## 2019-12-09 RX ORDER — LIDOCAINE HYDROCHLORIDE 10 MG/ML
INJECTION, SOLUTION EPIDURAL; INFILTRATION; INTRACAUDAL; PERINEURAL CODE/TRAUMA/SEDATION MEDICATION
Status: COMPLETED | OUTPATIENT
Start: 2019-12-09 | End: 2019-12-09

## 2019-12-09 RX ADMIN — MIDAZOLAM HYDROCHLORIDE 2 MG: 1 INJECTION, SOLUTION INTRAMUSCULAR; INTRAVENOUS at 10:08

## 2019-12-09 RX ADMIN — ASPIRIN 324 MG: 81 TABLET, CHEWABLE ORAL at 09:52

## 2019-12-09 RX ADMIN — IOHEXOL 60 ML: 350 INJECTION, SOLUTION INTRAVENOUS at 10:24

## 2019-12-09 RX ADMIN — FENTANYL CITRATE 25 MCG: 50 INJECTION INTRAMUSCULAR; INTRAVENOUS at 10:13

## 2019-12-09 RX ADMIN — VERAPAMIL HYDROCHLORIDE 2.5 MG: 2.5 INJECTION, SOLUTION INTRAVENOUS at 10:18

## 2019-12-09 RX ADMIN — HEPARIN SODIUM 4000 UNITS: 1000 INJECTION INTRAVENOUS; SUBCUTANEOUS at 10:18

## 2019-12-09 RX ADMIN — NITROGLYCERIN 200 MCG: 20 INJECTION INTRAVENOUS at 10:18

## 2019-12-09 RX ADMIN — LIDOCAINE HYDROCHLORIDE 1 ML: 10 INJECTION, SOLUTION EPIDURAL; INFILTRATION; INTRACAUDAL; PERINEURAL at 10:14

## 2019-12-09 RX ADMIN — FENTANYL CITRATE 50 MCG: 50 INJECTION INTRAMUSCULAR; INTRAVENOUS at 10:08

## 2019-12-09 RX ADMIN — SODIUM CHLORIDE 100 ML/HR: 0.9 INJECTION, SOLUTION INTRAVENOUS at 09:54

## 2019-12-09 RX ADMIN — MIDAZOLAM HYDROCHLORIDE 1 MG: 1 INJECTION, SOLUTION INTRAMUSCULAR; INTRAVENOUS at 10:14

## 2019-12-09 NOTE — DISCHARGE INSTRUCTIONS
After Radial Heart Catheterization   WHAT YOU NEED TO KNOW:   What will happen after a radial heart catheterization? · You will be attached to a heart monitor until you are fully awake  A heart monitor is an EKG that stays on continuously to record your heart's electrical activity  Healthcare providers will monitor your vital signs and pulses in your arm  They will frequently check your pressure bandage for bleeding or swelling  · You may have a band wrapped tightly around your wrist  The band puts pressure on your wound and helps prevent bleeding  A healthcare provider can put air into the band or remove air from the band  A healthcare provider will gradually remove air from the band and decrease pressure on your wrist  The band may be removed in 2 hours or when your wound stops bleeding  · You will need to keep your wrist straight for 2 to 4 hours  Do not  push or pull with your arm  Arm movements can cause serious bleeding  After you are monitored for several hours, you may go home or may need to stay in the hospital overnight  What do I need to know before I go home? · Care for your wound as directed  Remove the pressure bandage in 24 hours or as directed  Mild bruising is normal and expected  A small bandage can be placed on your wound after you remove the pressure bandage  Do not put powders, lotions, or creams on your wound  They may cause your wound to get infected  Monitor your wound every day for signs of infection, such as redness, swelling, or pus  · Shower the day after your procedure or as directed  Remove your pressure bandage before you shower  Do not take baths or go in hot tubs or pools  Carefully wash the wound with soap and water  Pat the area dry  A small bandage can be placed on your wound after you shower  · Apply firm, steady pressure to your wound if it bleeds  Apply pressure with a clean gauze or towel for 5 to 10 minutes   Call 911 if bleeding becomes heavy or does not stop  · Drink liquids as directed  Liquids will help flush the contrast liquid from your body  Ask how much liquid to drink each day and which liquids are best for you  · Do not lift anything heavier than 5 pounds until directed by your healthcare provider  Heavy lifting can put stress on your wound and cause bleeding  Do not push or pull with the arm that was used for the procedure  Do not do vigorous activity for at least 48 hours  Vigorous activity may cause bleeding from your wound  Rest and do quiet activities  Take short walks around the house to prevent a blood clot  Ask your healthcare provider when you can return to your normal activities  · Do not drive or return to work until your healthcare provider says it is okay  Your healthcare provider may tell you to wait 48 hours before you drive to decrease your risk for bleeding  You may not be able to return to work for at least 2 days after your procedure if your job involves heavy lifting  What medicines may I need? You may need any of the following:  · Blood thinners    help prevent blood clots  Examples of blood thinners include heparin and warfarin  Clots can cause strokes, heart attacks, and death  The following are general safety guidelines to follow while you are taking a blood thinner:    ¨ Watch for bleeding and bruising while you take blood thinners  Watch for bleeding from your gums or nose  Watch for blood in your urine and bowel movements  Use a soft washcloth on your skin, and a soft toothbrush to brush your teeth  This can keep your skin and gums from bleeding  If you shave, use an electric shaver  Do not play contact sports  ¨ Tell your dentist and other healthcare providers that you take anticoagulants  Wear a bracelet or necklace that says you take this medicine  ¨ Do not start or stop any medicines unless your healthcare provider tells you to  Many medicines cannot be used with blood thinners       ¨ Tell your healthcare provider right away if you forget to take the medicine, or if you take too much  ¨ Warfarin  is a blood thinner that you may need to take  The following are things you should be aware of if you take warfarin  § Foods and medicines can affect the amount of warfarin in your blood  Do not make major changes to your diet while you take warfarin  Warfarin works best when you eat about the same amount of vitamin K every day  Vitamin K is found in green leafy vegetables and certain other foods  Ask for more information about what to eat when you are taking warfarin  § You will need to see your healthcare provider for follow-up visits when you are on warfarin  You will need regular blood tests  These tests are used to decide how much medicine you need  · Acetaminophen  helps decrease pain and fever  This medicine is available without a doctor's order  Ask how much medicine is safe to take, and how often to take it  Acetaminophen can cause liver damage if not taken correctly  · Take your medicine as directed  Contact your healthcare provider if you think your medicine is not helping or if you have side effects  Tell him or her if you are allergic to any medicine  Keep a list of the medicines, vitamins, and herbs you take  Include the amounts, and when and why you take them  Bring the list or the pill bottles to follow-up visits  Carry your medicine list with you in case of an emergency    Call 911 for any of the following:   · You have any of the following signs of a heart attack:      ¨ Squeezing, pressure, or pain in your chest that lasts longer than 5 minutes or returns    ¨ Discomfort or pain in your back, neck, jaw, stomach, or arm     ¨ Trouble breathing    ¨ Nausea or vomiting    ¨ Lightheadedness or a sudden cold sweat, especially with chest pain or trouble breathing    · You have any of the following signs of a stroke:      ¨ Numbness or drooping on one side of your face     ¨ Weakness in an arm or leg    ¨ Confusion or difficulty speaking    ¨ Dizziness, a severe headache, or vision loss    · You feel lightheaded, short of breath, and have chest pain  · You cough up blood  · You have trouble breathing  · You cannot stop the bleeding from your wound even after you hold firm pressure for 10 minutes  When should I seek immediate care? · Blood soaks through your bandage  · Your stitches come apart  · Your hand or arm feels numb, cool, or looks pale  · Your wound gets swollen quickly  When should I contact my healthcare provider? · You have a fever or chills  · Your wound is red, swollen, or draining pus  · Your wound looks more bruised or you have new bruising on the side of your wrist      · You have nausea or are vomiting  · Your skin is itchy, swollen, or you have a rash  · You have questions or concerns about your condition or care  CARE AGREEMENT:   You have the right to help plan your care  Learn about your health condition and how it may be treated  Discuss treatment options with your caregivers to decide what care you want to receive  You always have the right to refuse treatment  The above information is an  only  It is not intended as medical advice for individual conditions or treatments  Talk to your doctor, nurse or pharmacist before following any medical regimen to see if it is safe and effective for you  © 2017 [employer] Information is for End User's use only and may not be sold, redistributed or otherwise used for commercial purposes  All illustrations and images included in CareNotes® are the copyrighted property of [employer]  or Ismael Shook

## 2019-12-09 NOTE — INTERVAL H&P NOTE
Update: (This section must be completed if the H&P was completed greater than 24 hrs to procedure or admission)    H&P reviewed  After examining the patient, I find no changed to the H&P since it had been written  /89   Pulse 81   Temp 97 6 °F (36 4 °C) (Temporal)   Resp 18   Ht 5' 5" (1 651 m)   Wt 98 6 kg (217 lb 6 oz)   SpO2 99%   BMI 36 17 kg/m²     Patient re-evaluated   Accept as history and physical     Orlin Pascual, DO/December 9, 2019/9:43 AM

## 2020-01-17 ENCOUNTER — HOSPITAL ENCOUNTER (OUTPATIENT)
Dept: NON INVASIVE DIAGNOSTICS | Facility: CLINIC | Age: 45
Discharge: HOME | End: 2020-01-17
Payer: COMMERCIAL

## 2020-01-17 DIAGNOSIS — E78.5 HYPERLIPIDEMIA, UNSPECIFIED HYPERLIPIDEMIA TYPE: ICD-10-CM

## 2020-01-17 DIAGNOSIS — R94.39 ABNORMAL NUCLEAR STRESS TEST: ICD-10-CM

## 2020-01-17 DIAGNOSIS — I10 ESSENTIAL (PRIMARY) HYPERTENSION: ICD-10-CM

## 2020-01-17 DIAGNOSIS — R07.9 CHEST PAIN, UNSPECIFIED TYPE: ICD-10-CM

## 2020-01-17 PROCEDURE — 93306 TTE W/DOPPLER COMPLETE: CPT

## 2020-01-17 PROCEDURE — 93306 TTE W/DOPPLER COMPLETE: CPT | Performed by: INTERNAL MEDICINE

## 2020-01-23 ENCOUNTER — OFFICE VISIT (OUTPATIENT)
Dept: URGENT CARE | Age: 45
End: 2020-01-23
Payer: COMMERCIAL

## 2020-01-23 VITALS
DIASTOLIC BLOOD PRESSURE: 82 MMHG | RESPIRATION RATE: 16 BRPM | SYSTOLIC BLOOD PRESSURE: 129 MMHG | HEIGHT: 65 IN | TEMPERATURE: 97.8 F | OXYGEN SATURATION: 98 % | HEART RATE: 84 BPM | WEIGHT: 214 LBS | BODY MASS INDEX: 35.65 KG/M2

## 2020-01-23 DIAGNOSIS — J01.90 ACUTE BACTERIAL SINUSITIS: Primary | ICD-10-CM

## 2020-01-23 DIAGNOSIS — R05.9 COUGH: ICD-10-CM

## 2020-01-23 DIAGNOSIS — B96.89 ACUTE BACTERIAL SINUSITIS: Primary | ICD-10-CM

## 2020-01-23 PROCEDURE — G0382 LEV 3 HOSP TYPE B ED VISIT: HCPCS | Performed by: NURSE PRACTITIONER

## 2020-01-23 PROCEDURE — 87070 CULTURE OTHR SPECIMN AEROBIC: CPT | Performed by: NURSE PRACTITIONER

## 2020-01-23 RX ORDER — AMOXICILLIN AND CLAVULANATE POTASSIUM 875; 125 MG/1; MG/1
1 TABLET, FILM COATED ORAL EVERY 12 HOURS SCHEDULED
Qty: 14 TABLET | Refills: 0 | Status: SHIPPED | OUTPATIENT
Start: 2020-01-23 | End: 2020-01-30

## 2020-01-23 RX ORDER — BENZONATATE 100 MG/1
100 CAPSULE ORAL 3 TIMES DAILY PRN
Qty: 20 CAPSULE | Refills: 0 | Status: SHIPPED | OUTPATIENT
Start: 2020-01-23 | End: 2020-11-18

## 2020-01-23 NOTE — PROGRESS NOTES
NAME: Lawrence Walker is a 45 y o  male  : 1975    MRN: [id number]    /82   Pulse 84   Temp 97 8 °F (36 6 °C)   Resp 16   Ht 5' 5" (1 651 m)   Wt 97 1 kg (214 lb)   SpO2 98%   BMI 35 61 kg/m²     Assessment and Plan   Acute bacterial sinusitis [J01 90, B96 89]  1  Acute bacterial sinusitis  amoxicillin-clavulanate (AUGMENTIN) 875-125 mg per tablet   2  Cough  benzonatate (TESSALON PERLES) 100 mg capsule       Eri was seen today for cold like symptoms  Diagnoses and all orders for this visit:    Acute bacterial sinusitis  -     amoxicillin-clavulanate (AUGMENTIN) 875-125 mg per tablet; Take 1 tablet by mouth every 12 (twelve) hours for 7 days    Cough  -     benzonatate (TESSALON PERLES) 100 mg capsule; Take 1 capsule (100 mg total) by mouth 3 (three) times a day as needed for cough        Patient Instructions   Patient Instructions     Take zyrtec or allegra   Use flonase 1-2 sprays in each nare daily   Use nasal saline to the nose,   Use humidifer in room  Symptoms worsen go to ER  Rest      Sinusitis   WHAT YOU NEED TO KNOW:   Sinusitis is inflammation or infection of your sinuses  It is most often caused by a virus  Acute sinusitis may last up to 12 weeks  Chronic sinusitis lasts longer than 12 weeks  Recurrent sinusitis means you have 4 or more times in 1 year  DISCHARGE INSTRUCTIONS:   Return to the emergency department if:   · Your eye and eyelid are red, swollen, and painful  · You cannot open your eye  · You have vision changes, such as double vision  · Your eyeball bulges out or you cannot move your eye  · You are more sleepy than normal, or you notice changes in your ability to think, move, or talk  · You have a stiff neck, a fever, or a bad headache  · You have swelling of your forehead or scalp  Contact your healthcare provider if:   · Your symptoms do not improve after 3 days  · Your symptoms do not go away after 10 days      · You have nausea and are vomiting  · Your nose is bleeding  · You have questions or concerns about your condition or care  Medicines: Your symptoms may go away on their own  Your healthcare provider may recommend watchful waiting for up to 10 days before starting antibiotics  You may  need any of the following:  · Acetaminophen  decreases pain and fever  It is available without a doctor's order  Ask how much to take and how often to take it  Follow directions  Read the labels of all other medicines you are using to see if they also contain acetaminophen, or ask your doctor or pharmacist  Acetaminophen can cause liver damage if not taken correctly  Do not use more than 4 grams (4,000 milligrams) total of acetaminophen in one day  · NSAIDs , such as ibuprofen, help decrease swelling, pain, and fever  This medicine is available with or without a doctor's order  NSAIDs can cause stomach bleeding or kidney problems in certain people  If you take blood thinner medicine, always ask your healthcare provider if NSAIDs are safe for you  Always read the medicine label and follow directions  · Nasal steroid sprays  may help decrease inflammation in your nose and sinuses  · Decongestants  help reduce swelling and drain mucus in the nose and sinuses  They may help you breathe easier  · Antihistamines  help dry mucus in the nose and relieve sneezing  · Antibiotics  help treat or prevent a bacterial infection  · Take your medicine as directed  Contact your healthcare provider if you think your medicine is not helping or if you have side effects  Tell him or her if you are allergic to any medicine  Keep a list of the medicines, vitamins, and herbs you take  Include the amounts, and when and why you take them  Bring the list or the pill bottles to follow-up visits  Carry your medicine list with you in case of an emergency  Self-care:   · Rinse your sinuses    Use a sinus rinse device to rinse your nasal passages with a saline (salt water) solution or distilled water  Do not use tap water  This will help thin the mucus in your nose and rinse away pollen and dirt  It will also help reduce swelling so you can breathe normally  Ask your healthcare provider how often to do this  · Breathe in steam   Heat a bowl of water until you see steam  Lean over the bowl and make a tent over your head with a large towel  Breathe deeply for about 20 minutes  Be careful not to get too close to the steam or burn yourself  Do this 3 times a day  You can also breathe deeply when you take a hot shower  · Sleep with your head elevated  Place an extra pillow under your head before you go to sleep to help your sinuses drain  · Drink liquids as directed  Ask your healthcare provider how much liquid to drink each day and which liquids are best for you  Liquids will thin the mucus in your nose and help it drain  Avoid drinks that contain alcohol or caffeine  · Do not smoke, and avoid secondhand smoke  Nicotine and other chemicals in cigarettes and cigars can make your symptoms worse  Ask your healthcare provider for information if you currently smoke and need help to quit  E-cigarettes or smokeless tobacco still contain nicotine  Talk to your healthcare provider before you use these products  Prevent the spread of germs that cause sinusitis:  Wash your hands often with soap and water  Wash your hands after you use the bathroom, change a child's diaper, or sneeze  Wash your hands before you prepare or eat food  Follow up with your healthcare provider as directed: You may be referred to an ear, nose, and throat specialist  Write down your questions so you remember to ask them during your visits  © 2017 [address]  Information is for End User's use only and may not be sold, redistributed or otherwise used for commercial purposes   All illustrations and images included in CareNotes® are the copyrighted property of [employer]  or Ismael Shook  The above information is an  only  It is not intended as medical advice for individual conditions or treatments  Talk to your doctor, nurse or pharmacist before following any medical regimen to see if it is safe and effective for you  Proceed to ER if symptoms worsen  Chief Complaint     Chief Complaint   Patient presents with    Cold Like Symptoms     Pt stated he has chest congestion and body aches  Pt stated symptoms started Sunday night  Pt has some mild ear pressure  Pt has taken tylenol cold and flu severe last night  History of Present Illness     44 yo male here today with cough and congestion, post nasal drip , fever, chills, raw feeling in throat, and feels that its in his chest  He has some tightness at times  All the symptoms since Sunday and 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  He has been taking some OTC meds for his symptoms but not much better, He has had a flu shot this season as well  Review of Systems   Review of Systems   Constitutional: Positive for fatigue and fever  Negative for chills  HENT: Positive for congestion, ear pain (fullness), postnasal drip, sinus pressure, sinus pain and sore throat  Negative for rhinorrhea  Eyes: Positive for pain  Respiratory: Positive for cough  Cardiovascular: Negative  Gastrointestinal: Positive for nausea  Genitourinary: Negative  Musculoskeletal: Negative  Negative for neck pain  Allergic/Immunologic: Environmental allergies: Sinus  Neurological: Positive for headaches           Current Medications       Current Outpatient Medications:     aspirin (ECOTRIN LOW STRENGTH) 81 mg EC tablet, Take 81 mg by mouth daily, Disp: , Rfl:     atorvastatin (LIPITOR) 40 mg tablet, Take 1 tablet (40 mg total) by mouth daily, Disp: 90 tablet, Rfl: 3    losartan (COZAAR) 50 mg tablet, Take 1 tablet (50 mg total) by mouth daily, Disp: 90 tablet, Rfl: 3   metoprolol succinate (TOPROL-XL) 50 mg 24 hr tablet, Take 50 mg by mouth daily, Disp: , Rfl: 1    amoxicillin-clavulanate (AUGMENTIN) 875-125 mg per tablet, Take 1 tablet by mouth every 12 (twelve) hours for 7 days, Disp: 14 tablet, Rfl: 0    benzonatate (TESSALON PERLES) 100 mg capsule, Take 1 capsule (100 mg total) by mouth 3 (three) times a day as needed for cough, Disp: 20 capsule, Rfl: 0    Current Allergies     Allergies as of 01/23/2020    (No Known Allergies)              Past Medical History:   Diagnosis Date    Hypertension     Nasal disorder     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 patient     Sleep apnea        Past Surgical History:   Procedure Laterality Date    HERNIA REPAIR         History reviewed  No pertinent family history  Medications have been verified  The following portions of the patient's history were reviewed and updated as appropriate: allergies, current medications, past family history, past medical history, past social history, past surgical history and problem list     Objective   /82   Pulse 84   Temp 97 8 °F (36 6 °C)   Resp 16   Ht 5' 5" (1 651 m)   Wt 97 1 kg (214 lb)   SpO2 98%   BMI 35 61 kg/m²      Physical Exam     Physical Exam   Constitutional: He is oriented to person, place, and time  He appears well-developed and well-nourished  He is active and cooperative  HENT:   Right Ear: Hearing, external ear and ear canal normal    Left Ear: Hearing, external ear and ear canal normal    Nose: Mucosal edema and rhinorrhea present  Right sinus exhibits maxillary sinus tenderness  Left sinus exhibits maxillary sinus tenderness  Mouth/Throat: Uvula is midline and mucous membranes are normal  Uvula swelling present  Posterior oropharyngeal edema and posterior oropharyngeal erythema present  No oropharyngeal exudate or tonsillar abscesses  No tonsillar exudate     Turbinates are red and swollen, thick mucus noted in both nares the turbinates in the right nare are worse than the left nare, TMs are dull to light reflex b/l, and post nasal drip is present,    Cardiovascular: Normal rate, regular rhythm and normal heart sounds  Pulmonary/Chest: Effort normal and breath sounds normal  No stridor  No respiratory distress  He has no decreased breath sounds  He has no wheezes  He has no rhonchi  He has no rales  Musculoskeletal:   Denies body aches     Neurological: He is alert and oriented to person, place, and time  Psychiatric: He has a normal mood and affect         BRAYAN Morales Si

## 2020-01-23 NOTE — PATIENT INSTRUCTIONS
Take zyrtec or allegra   Use flonase 1-2 sprays in each nare daily   Use nasal saline to the nose,   Use humidifer in room  Symptoms worsen go to ER  Rest      Sinusitis   WHAT YOU NEED TO KNOW:   Sinusitis is inflammation or infection of your sinuses  It is most often caused by a virus  Acute sinusitis may last up to 12 weeks  Chronic sinusitis lasts longer than 12 weeks  Recurrent sinusitis means you have 4 or more times in 1 year  DISCHARGE INSTRUCTIONS:   Return to the emergency department if:   · Your eye and eyelid are red, swollen, and painful  · You cannot open your eye  · You have vision changes, such as double vision  · Your eyeball bulges out or you cannot move your eye  · You are more sleepy than normal, or you notice changes in your ability to think, move, or talk  · You have a stiff neck, a fever, or a bad headache  · You have swelling of your forehead or scalp  Contact your healthcare provider if:   · Your symptoms do not improve after 3 days  · Your symptoms do not go away after 10 days  · You have nausea and are vomiting  · Your nose is bleeding  · You have questions or concerns about your condition or care  Medicines: Your symptoms may go away on their own  Your healthcare provider may recommend watchful waiting for up to 10 days before starting antibiotics  You may  need any of the following:  · Acetaminophen  decreases pain and fever  It is available without a doctor's order  Ask how much to take and how often to take it  Follow directions  Read the labels of all other medicines you are using to see if they also contain acetaminophen, or ask your doctor or pharmacist  Acetaminophen can cause liver damage if not taken correctly  Do not use more than 4 grams (4,000 milligrams) total of acetaminophen in one day  · NSAIDs , such as ibuprofen, help decrease swelling, pain, and fever  This medicine is available with or without a doctor's order   NSAIDs can cause stomach bleeding or kidney problems in certain people  If you take blood thinner medicine, always ask your healthcare provider if NSAIDs are safe for you  Always read the medicine label and follow directions  · Nasal steroid sprays  may help decrease inflammation in your nose and sinuses  · Decongestants  help reduce swelling and drain mucus in the nose and sinuses  They may help you breathe easier  · Antihistamines  help dry mucus in the nose and relieve sneezing  · Antibiotics  help treat or prevent a bacterial infection  · Take your medicine as directed  Contact your healthcare provider if you think your medicine is not helping or if you have side effects  Tell him or her if you are allergic to any medicine  Keep a list of the medicines, vitamins, and herbs you take  Include the amounts, and when and why you take them  Bring the list or the pill bottles to follow-up visits  Carry your medicine list with you in case of an emergency  Self-care:   · Rinse your sinuses  Use a sinus rinse device to rinse your nasal passages with a saline (salt water) solution or distilled water  Do not use tap water  This will help thin the mucus in your nose and rinse away pollen and dirt  It will also help reduce swelling so you can breathe normally  Ask your healthcare provider how often to do this  · Breathe in steam   Heat a bowl of water until you see steam  Lean over the bowl and make a tent over your head with a large towel  Breathe deeply for about 20 minutes  Be careful not to get too close to the steam or burn yourself  Do this 3 times a day  You can also breathe deeply when you take a hot shower  · Sleep with your head elevated  Place an extra pillow under your head before you go to sleep to help your sinuses drain  · Drink liquids as directed  Ask your healthcare provider how much liquid to drink each day and which liquids are best for you   Liquids will thin the mucus in your nose and help it drain  Avoid drinks that contain alcohol or caffeine  · Do not smoke, and avoid secondhand smoke  Nicotine and other chemicals in cigarettes and cigars can make your symptoms worse  Ask your healthcare provider for information if you currently smoke and need help to quit  E-cigarettes or smokeless tobacco still contain nicotine  Talk to your healthcare provider before you use these products  Prevent the spread of germs that cause sinusitis:  Wash your hands often with soap and water  Wash your hands after you use the bathroom, change a child's diaper, or sneeze  Wash your hands before you prepare or eat food  Follow up with your healthcare provider as directed: You may be referred to an ear, nose, and throat specialist  Write down your questions so you remember to ask them during your visits  © 2017 [address] Information is for End User's use only and may not be sold, redistributed or otherwise used for commercial purposes  All illustrations and images included in CareNotes® are the copyrighted property of [employer]  or Ismael Shook  The above information is an  only  It is not intended as medical advice for individual conditions or treatments  Talk to your doctor, nurse or pharmacist before following any medical regimen to see if it is safe and effective for you

## 2020-01-25 LAB — BACTERIA THROAT CULT: NORMAL

## 2020-03-11 ENCOUNTER — OFFICE VISIT (OUTPATIENT)
Dept: URGENT CARE | Age: 45
End: 2020-03-11
Payer: COMMERCIAL

## 2020-03-11 VITALS
RESPIRATION RATE: 18 BRPM | DIASTOLIC BLOOD PRESSURE: 91 MMHG | TEMPERATURE: 97.1 F | OXYGEN SATURATION: 99 % | WEIGHT: 218 LBS | HEIGHT: 65 IN | HEART RATE: 86 BPM | BODY MASS INDEX: 36.32 KG/M2 | SYSTOLIC BLOOD PRESSURE: 135 MMHG

## 2020-03-11 DIAGNOSIS — J02.9 VIRAL PHARYNGITIS: ICD-10-CM

## 2020-03-11 DIAGNOSIS — J02.9 SORE THROAT: Primary | ICD-10-CM

## 2020-03-11 LAB — S PYO AG THROAT QL: NEGATIVE

## 2020-03-11 PROCEDURE — 87070 CULTURE OTHR SPECIMN AEROBIC: CPT | Performed by: NURSE PRACTITIONER

## 2020-03-11 PROCEDURE — G0382 LEV 3 HOSP TYPE B ED VISIT: HCPCS | Performed by: NURSE PRACTITIONER

## 2020-03-11 PROCEDURE — 87880 STREP A ASSAY W/OPTIC: CPT | Performed by: NURSE PRACTITIONER

## 2020-03-11 NOTE — PROGRESS NOTES
NAME: Michelle Soler is a 45 y o  male  : 1975    MRN: [id number]    /91 (BP Location: Left arm, Patient Position: Sitting, Cuff Size: Adult)   Pulse 86   Temp (!) 97 1 °F (36 2 °C) (Tympanic)   Resp 18   Ht 5' 5" (1 651 m)   Wt 98 9 kg (218 lb)   SpO2 99%   BMI 36 28 kg/m²     Assessment and Plan   Sore throat [J02 9]  1  Sore throat  POCT rapid strepA    Throat culture   2  Viral pharyngitis         Yarely Caicedo was seen today for cough  Diagnoses and all orders for this visit:    Sore throat  -     POCT rapid strepA  -     Throat culture    Viral pharyngitis    rapid strep  Negative, sent for a culture    Patient Instructions   Patient Instructions   Follow up with pcp   Take meds as directed   Increase fluids     Take zyrtec or allegra daily   Use flonase as directed  If worse go to the ER   Rest     No bacterial infection noted at this time,   Rest     Sore Throat, Ambulatory Care   GENERAL INFORMATION:   A sore throat  is often caused by a cold or flu virus  A sore throat may also be caused by bacteria such as strep  Other causes include smoking, a runny nose, allergies, or acid reflux  Seek immediate care for the following symptoms:   · Trouble breathing or swallowing because your throat is swollen or sore    · Drooling because it hurts too much to swallow    · A painful lump in your throat that does not go away after 5 days    · A fever higher than 102? F (39?C) or lasts longer than 3 days    · Confusion    · Blood in your throat or ear  Treatment for a sore throat  will depend on the cause how severe it is  A sore throat cause by a virus will go away on its own without treatment  You will need antibiotics if your sore throat is caused by bacteria  Your sore throat should start to feel better within 3 to 5 days for both viral and bacterial infections  Care for your sore throat:   · Gargle with salt water  Mix ¼ teaspoon salt in a glass of warm water and gargle   This may help reduce swelling in your throat  · Take ibuprofen or acetaminophen:  These medicines decrease pain and fever  They are available without a doctor's order  Ask your healthcare provider which medicine is best for you  Ask how much to take and how often to take it  · Drink more liquids  Cold or warm drinks may help soothe your sore throat  Drinking liquids can also help prevent dehydration  · Use a cool-steam humidifier  to help moisten the air in your room and reduce your throat pain  · Use lozenges, ice, soft foods, or popsicles  to soothe your throat  · Rest your throat as much as possible  Try not to use your voice  This may irritate your throat and worsen your symptoms  Follow up with your healthcare provider as directed:  Write down your questions so you remember to ask them during your visits  CARE AGREEMENT:   You have the right to help plan your care  Learn about your health condition and how it may be treated  Discuss treatment options with your caregivers to decide what care you want to receive  You always have the right to refuse treatment  The above information is an  only  It is not intended as medical advice for individual conditions or treatments  Talk to your doctor, nurse or pharmacist before following any medical regimen to see if it is safe and effective for you  © 2014 [address] is for End User's use only and may not be sold, redistributed or otherwise used for commercial purposes  All illustrations and images included in CareNotes® are the copyrighted property of [employer]  or Ismael Shook  Proceed to ER if symptoms worsen  Chief Complaint     Chief Complaint   Patient presents with    Cough     pt c/o cough and sore throat x4 days  denies fever  pt did get flu shot this year  History of Present Illness     Patient is a 28-year-old male who is here today with a cough and sore throat for the past 4 days  He denies having any fevers and he did have a flu shot this season  Pt was put on Augmentin for bacterial Sinusitits, no improvement and then was changed to Levaquin by his PCP and finished that and then started with a sore throat  He is currently not taking zyrtec at this time  Pt just finished levaquin 5 days ago after being on it for a week, he has a young child at home sick with pneumonia  Review of Systems   Review of Systems   Constitutional: Negative for fever  HENT: Positive for congestion, postnasal drip, rhinorrhea, sinus pressure, sinus pain and sore throat  Negative for ear pain, sneezing and tinnitus  Eyes: Negative  Respiratory: Positive for cough  Negative for shortness of breath and wheezing  Cardiovascular: Negative  Negative for chest pain  Gastrointestinal: Negative  Genitourinary: Negative  Musculoskeletal: Negative  Skin: Negative  Negative for rash  Allergic/Immunologic: Negative  Neurological: Negative  Hematological: Negative  Psychiatric/Behavioral: Negative  All other systems reviewed and are negative          Current Medications       Current Outpatient Medications:     aspirin (ECOTRIN LOW STRENGTH) 81 mg EC tablet, Take 81 mg by mouth daily, Disp: , Rfl:     atorvastatin (LIPITOR) 40 mg tablet, Take 1 tablet (40 mg total) by mouth daily, Disp: 90 tablet, Rfl: 3    losartan (COZAAR) 50 mg tablet, Take 1 tablet (50 mg total) by mouth daily, Disp: 90 tablet, Rfl: 3    metoprolol succinate (TOPROL-XL) 50 mg 24 hr tablet, Take 50 mg by mouth daily, Disp: , Rfl: 1    benzonatate (TESSALON PERLES) 100 mg capsule, Take 1 capsule (100 mg total) by mouth 3 (three) times a day as needed for cough (Patient not taking: Reported on 3/11/2020), Disp: 20 capsule, Rfl: 0    Current Allergies     Allergies as of 03/11/2020    (No Known Allergies)              Past Medical History:   Diagnosis Date    Hypertension     Nasal disorder     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 patient     Sleep apnea        Past Surgical History:   Procedure Laterality Date    HERNIA REPAIR         History reviewed  No pertinent family history  Medications have been verified  The following portions of the patient's history were reviewed and updated as appropriate: allergies, current medications, past family history, past medical history, past social history, past surgical history and problem list     Objective   /91 (BP Location: Left arm, Patient Position: Sitting, Cuff Size: Adult)   Pulse 86   Temp (!) 97 1 °F (36 2 °C) (Tympanic)   Resp 18   Ht 5' 5" (1 651 m)   Wt 98 9 kg (218 lb)   SpO2 99%   BMI 36 28 kg/m²      Physical Exam     Physical Exam   Constitutional: He appears well-developed and well-nourished  He is cooperative  No distress  HENT:   Head: Normocephalic  Right Ear: Hearing and tympanic membrane normal    Left Ear: Hearing and tympanic membrane normal    Nose: Mucosal edema present  Right sinus exhibits no maxillary sinus tenderness and no frontal sinus tenderness  Left sinus exhibits no maxillary sinus tenderness and no frontal sinus tenderness  Mouth/Throat: Uvula is midline and mucous membranes are normal  Posterior oropharyngeal edema and posterior oropharyngeal erythema present  No oropharyngeal exudate  Tonsils are 0 on the right  Tonsils are 0 on the left  No tonsillar exudate  Turbinates in the right nare are red and swollen, post nasal drip      Cardiovascular: Normal rate, regular rhythm and normal heart sounds  Pulmonary/Chest: Effort normal and breath sounds normal  No stridor  No respiratory distress  He has no decreased breath sounds  He has no wheezes  He has no rhonchi  Neurological: He is alert         BRAYAN Mascorro

## 2020-03-11 NOTE — PATIENT INSTRUCTIONS
Follow up with pcp   Take meds as directed   Increase fluids     Take zyrtec or allegra daily   Use flonase as directed  If worse go to the ER   Rest     No bacterial infection noted at this time,   Rest     Sore Throat, Ambulatory Care   GENERAL INFORMATION:   A sore throat  is often caused by a cold or flu virus  A sore throat may also be caused by bacteria such as strep  Other causes include smoking, a runny nose, allergies, or acid reflux  Seek immediate care for the following symptoms:   · Trouble breathing or swallowing because your throat is swollen or sore    · Drooling because it hurts too much to swallow    · A painful lump in your throat that does not go away after 5 days    · A fever higher than 102? F (39?C) or lasts longer than 3 days    · Confusion    · Blood in your throat or ear  Treatment for a sore throat  will depend on the cause how severe it is  A sore throat cause by a virus will go away on its own without treatment  You will need antibiotics if your sore throat is caused by bacteria  Your sore throat should start to feel better within 3 to 5 days for both viral and bacterial infections  Care for your sore throat:   · Gargle with salt water  Mix ¼ teaspoon salt in a glass of warm water and gargle  This may help reduce swelling in your throat  · Take ibuprofen or acetaminophen:  These medicines decrease pain and fever  They are available without a doctor's order  Ask your healthcare provider which medicine is best for you  Ask how much to take and how often to take it  · Drink more liquids  Cold or warm drinks may help soothe your sore throat  Drinking liquids can also help prevent dehydration  · Use a cool-steam humidifier  to help moisten the air in your room and reduce your throat pain  · Use lozenges, ice, soft foods, or popsicles  to soothe your throat  · Rest your throat as much as possible  Try not to use your voice   This may irritate your throat and worsen your symptoms  Follow up with your healthcare provider as directed:  Write down your questions so you remember to ask them during your visits  CARE AGREEMENT:   You have the right to help plan your care  Learn about your health condition and how it may be treated  Discuss treatment options with your caregivers to decide what care you want to receive  You always have the right to refuse treatment  The above information is an  only  It is not intended as medical advice for individual conditions or treatments  Talk to your doctor, nurse or pharmacist before following any medical regimen to see if it is safe and effective for you  © 2014 [address] is for End User's use only and may not be sold, redistributed or otherwise used for commercial purposes  All illustrations and images included in CareNotes® are the copyrighted property of A D A M , Inc  or Ismael Shook

## 2020-03-13 LAB — BACTERIA THROAT CULT: NORMAL

## 2020-03-14 ENCOUNTER — TELEPHONE (OUTPATIENT)
Dept: URGENT CARE | Age: 45
End: 2020-03-14

## 2020-03-27 ENCOUNTER — TRANSCRIBE ORDERS (OUTPATIENT)
Dept: LAB | Facility: CLINIC | Age: 45
End: 2020-03-27

## 2020-03-27 ENCOUNTER — APPOINTMENT (OUTPATIENT)
Dept: URGENT CARE | Age: 45
End: 2020-03-27
Payer: COMMERCIAL

## 2020-03-27 ENCOUNTER — APPOINTMENT (OUTPATIENT)
Dept: RADIOLOGY | Age: 45
End: 2020-03-27
Payer: COMMERCIAL

## 2020-03-27 ENCOUNTER — DOCUMENTATION (OUTPATIENT)
Dept: URGENT CARE | Age: 45
End: 2020-03-27

## 2020-03-27 DIAGNOSIS — R05.9 COUGH: ICD-10-CM

## 2020-03-27 DIAGNOSIS — D50.9 IRON DEFICIENCY ANEMIA, UNSPECIFIED IRON DEFICIENCY ANEMIA TYPE: Primary | ICD-10-CM

## 2020-03-27 PROCEDURE — 87635 SARS-COV-2 COVID-19 AMP PRB: CPT | Performed by: INTERNAL MEDICINE

## 2020-03-27 PROCEDURE — 71046 X-RAY EXAM CHEST 2 VIEWS: CPT

## 2020-04-02 LAB — SARS-COV-2 RNA SPEC QL NAA+PROBE: NOT DETECTED

## 2020-10-24 ENCOUNTER — NURSE TRIAGE (OUTPATIENT)
Dept: OTHER | Facility: OTHER | Age: 45
End: 2020-10-24

## 2020-10-24 ENCOUNTER — HOSPITAL ENCOUNTER (EMERGENCY)
Facility: HOSPITAL | Age: 45
Discharge: HOME | End: 2020-10-24
Attending: EMERGENCY MEDICINE | Admitting: EMERGENCY MEDICINE
Payer: OTHER MISCELLANEOUS

## 2020-10-24 ENCOUNTER — APPOINTMENT (EMERGENCY)
Dept: RADIOLOGY | Facility: HOSPITAL | Age: 45
End: 2020-10-24
Payer: OTHER MISCELLANEOUS

## 2020-10-24 VITALS
SYSTOLIC BLOOD PRESSURE: 181 MMHG | BODY MASS INDEX: 36.87 KG/M2 | RESPIRATION RATE: 24 BRPM | DIASTOLIC BLOOD PRESSURE: 103 MMHG | HEART RATE: 103 BPM | OXYGEN SATURATION: 100 % | TEMPERATURE: 99.3 F | WEIGHT: 221.56 LBS

## 2020-10-24 DIAGNOSIS — Z20.822 ENCOUNTER FOR LABORATORY TESTING FOR COVID-19 VIRUS: ICD-10-CM

## 2020-10-24 DIAGNOSIS — R05.9 COUGH: ICD-10-CM

## 2020-10-24 DIAGNOSIS — B34.9 VIRAL INFECTION: ICD-10-CM

## 2020-10-24 DIAGNOSIS — R06.00 DYSPNEA: Primary | ICD-10-CM

## 2020-10-24 PROCEDURE — 71045 X-RAY EXAM CHEST 1 VIEW: CPT

## 2020-10-24 PROCEDURE — U0003 INFECTIOUS AGENT DETECTION BY NUCLEIC ACID (DNA OR RNA); SEVERE ACUTE RESPIRATORY SYNDROME CORONAVIRUS 2 (SARS-COV-2) (CORONAVIRUS DISEASE [COVID-19]), AMPLIFIED PROBE TECHNIQUE, MAKING USE OF HIGH THROUGHPUT TECHNOLOGIES AS DESCRIBED BY CMS-2020-01-R: HCPCS | Performed by: EMERGENCY MEDICINE

## 2020-10-24 PROCEDURE — 99285 EMERGENCY DEPT VISIT HI MDM: CPT

## 2020-10-24 PROCEDURE — 99284 EMERGENCY DEPT VISIT MOD MDM: CPT | Performed by: EMERGENCY MEDICINE

## 2020-10-26 LAB — SARS-COV-2 RNA SPEC QL NAA+PROBE: NOT DETECTED

## 2020-10-27 ENCOUNTER — TRANSCRIBE ORDERS (OUTPATIENT)
Dept: ADMINISTRATIVE | Facility: HOSPITAL | Age: 45
End: 2020-10-27

## 2020-10-27 DIAGNOSIS — R06.02 SHORTNESS OF BREATH: Primary | ICD-10-CM

## 2020-10-28 ENCOUNTER — HOSPITAL ENCOUNTER (OUTPATIENT)
Dept: RADIOLOGY | Facility: IMAGING CENTER | Age: 45
Discharge: HOME | End: 2020-10-28
Payer: COMMERCIAL

## 2020-10-28 DIAGNOSIS — R06.02 SHORTNESS OF BREATH: ICD-10-CM

## 2020-10-28 PROCEDURE — 71250 CT THORAX DX C-: CPT

## 2020-10-28 PROCEDURE — G1004 CDSM NDSC: HCPCS

## 2020-11-16 ENCOUNTER — TELEPHONE (OUTPATIENT)
Dept: PULMONOLOGY | Facility: CLINIC | Age: 45
End: 2020-11-16

## 2020-11-18 ENCOUNTER — OFFICE VISIT (OUTPATIENT)
Dept: PULMONOLOGY | Facility: CLINIC | Age: 45
End: 2020-11-18
Payer: COMMERCIAL

## 2020-11-18 VITALS
WEIGHT: 220 LBS | HEIGHT: 65 IN | DIASTOLIC BLOOD PRESSURE: 90 MMHG | OXYGEN SATURATION: 99 % | SYSTOLIC BLOOD PRESSURE: 130 MMHG | HEART RATE: 90 BPM | BODY MASS INDEX: 36.65 KG/M2 | TEMPERATURE: 96.9 F

## 2020-11-18 DIAGNOSIS — E66.09 CLASS 2 OBESITY DUE TO EXCESS CALORIES WITHOUT SERIOUS COMORBIDITY WITH BODY MASS INDEX (BMI) OF 36.0 TO 36.9 IN ADULT: ICD-10-CM

## 2020-11-18 DIAGNOSIS — J45.30 MILD PERSISTENT ASTHMA WITHOUT COMPLICATION: Primary | ICD-10-CM

## 2020-11-18 DIAGNOSIS — R05.3 CHRONIC COUGH: ICD-10-CM

## 2020-11-18 DIAGNOSIS — G47.33 OSA (OBSTRUCTIVE SLEEP APNEA): ICD-10-CM

## 2020-11-18 PROBLEM — E66.812 CLASS 2 OBESITY DUE TO EXCESS CALORIES WITH BODY MASS INDEX (BMI) OF 36.0 TO 36.9 IN ADULT: Status: ACTIVE | Noted: 2020-11-18

## 2020-11-18 PROCEDURE — 94010 BREATHING CAPACITY TEST: CPT | Performed by: INTERNAL MEDICINE

## 2020-11-18 PROCEDURE — 99244 OFF/OP CNSLTJ NEW/EST MOD 40: CPT | Performed by: INTERNAL MEDICINE

## 2020-11-18 RX ORDER — FLUTICASONE PROPIONATE 110 UG/1
2 AEROSOL, METERED RESPIRATORY (INHALATION) 2 TIMES DAILY
Qty: 1 INHALER | Refills: 3 | Status: SHIPPED | OUTPATIENT
Start: 2020-11-18

## 2020-11-18 RX ORDER — ALBUTEROL SULFATE 90 UG/1
2 AEROSOL, METERED RESPIRATORY (INHALATION) EVERY 6 HOURS PRN
Qty: 18 G | Refills: 3 | Status: SHIPPED | OUTPATIENT
Start: 2020-11-18

## 2020-11-18 RX ORDER — MONTELUKAST SODIUM 10 MG/1
10 TABLET ORAL
Qty: 30 TABLET | Refills: 3 | Status: SHIPPED | OUTPATIENT
Start: 2020-11-18

## 2020-11-19 ENCOUNTER — TELEPHONE (OUTPATIENT)
Dept: SLEEP CENTER | Facility: CLINIC | Age: 45
End: 2020-11-19

## 2020-11-19 DIAGNOSIS — Z01.812 ENCOUNTER FOR PREPROCEDURE SCREENING LABORATORY TESTING FOR COVID-19: Primary | ICD-10-CM

## 2020-11-19 DIAGNOSIS — Z20.822 ENCOUNTER FOR PREPROCEDURE SCREENING LABORATORY TESTING FOR COVID-19: Primary | ICD-10-CM

## 2020-11-30 ENCOUNTER — OFFICE VISIT (OUTPATIENT)
Dept: URGENT CARE | Age: 45
End: 2020-11-30
Payer: COMMERCIAL

## 2020-11-30 ENCOUNTER — APPOINTMENT (OUTPATIENT)
Dept: RADIOLOGY | Age: 45
End: 2020-11-30
Attending: PHYSICIAN ASSISTANT
Payer: COMMERCIAL

## 2020-11-30 DIAGNOSIS — M79.671 RIGHT FOOT PAIN: Primary | ICD-10-CM

## 2020-11-30 DIAGNOSIS — M79.671 RIGHT FOOT PAIN: ICD-10-CM

## 2020-11-30 PROCEDURE — 99283 EMERGENCY DEPT VISIT LOW MDM: CPT | Performed by: PHYSICIAN ASSISTANT

## 2020-11-30 PROCEDURE — G0382 LEV 3 HOSP TYPE B ED VISIT: HCPCS | Performed by: PHYSICIAN ASSISTANT

## 2020-11-30 PROCEDURE — 73630 X-RAY EXAM OF FOOT: CPT

## 2020-12-03 ENCOUNTER — APPOINTMENT (OUTPATIENT)
Dept: URGENT CARE | Age: 45
End: 2020-12-03
Payer: COMMERCIAL

## 2020-12-03 PROCEDURE — 99214 OFFICE O/P EST MOD 30 MIN: CPT | Performed by: PREVENTIVE MEDICINE

## 2020-12-08 ENCOUNTER — TELEPHONE (OUTPATIENT)
Dept: SLEEP CENTER | Facility: CLINIC | Age: 45
End: 2020-12-08

## 2020-12-10 ENCOUNTER — TRANSCRIBE ORDERS (OUTPATIENT)
Dept: ADMINISTRATIVE | Facility: HOSPITAL | Age: 45
End: 2020-12-10

## 2020-12-10 DIAGNOSIS — S93.501D SPRAIN OF RIGHT GREAT TOE, SUBSEQUENT ENCOUNTER: Primary | ICD-10-CM

## 2020-12-20 ENCOUNTER — IMMUNIZATIONS (OUTPATIENT)
Dept: FAMILY MEDICINE CLINIC | Facility: HOSPITAL | Age: 45
End: 2020-12-20
Payer: COMMERCIAL

## 2020-12-20 DIAGNOSIS — Z23 ENCOUNTER FOR IMMUNIZATION: ICD-10-CM

## 2020-12-20 PROCEDURE — 91300 SARS-COV-2 / COVID-19 MRNA VACCINE (PFIZER-BIONTECH) 30 MCG: CPT

## 2020-12-20 PROCEDURE — 0001A SARS-COV-2 / COVID-19 MRNA VACCINE (PFIZER-BIONTECH) 30 MCG: CPT

## 2020-12-21 ENCOUNTER — HOSPITAL ENCOUNTER (OUTPATIENT)
Dept: RADIOLOGY | Facility: IMAGING CENTER | Age: 45
Discharge: HOME | End: 2020-12-21
Payer: OTHER MISCELLANEOUS

## 2020-12-21 DIAGNOSIS — S93.501D SPRAIN OF RIGHT GREAT TOE, SUBSEQUENT ENCOUNTER: ICD-10-CM

## 2020-12-21 PROCEDURE — 73718 MRI LOWER EXTREMITY W/O DYE: CPT

## 2020-12-21 PROCEDURE — G1004 CDSM NDSC: HCPCS

## 2020-12-24 ENCOUNTER — APPOINTMENT (OUTPATIENT)
Dept: URGENT CARE | Age: 45
End: 2020-12-24
Payer: OTHER MISCELLANEOUS

## 2020-12-24 PROCEDURE — 99213 OFFICE O/P EST LOW 20 MIN: CPT | Performed by: PREVENTIVE MEDICINE

## 2021-01-10 ENCOUNTER — IMMUNIZATIONS (OUTPATIENT)
Dept: FAMILY MEDICINE CLINIC | Facility: HOSPITAL | Age: 46
End: 2021-01-10

## 2021-01-10 DIAGNOSIS — Z23 ENCOUNTER FOR IMMUNIZATION: ICD-10-CM

## 2021-01-10 PROCEDURE — 0002A SARS-COV-2 / COVID-19 MRNA VACCINE (PFIZER-BIONTECH) 30 MCG: CPT

## 2021-01-10 PROCEDURE — 91300 SARS-COV-2 / COVID-19 MRNA VACCINE (PFIZER-BIONTECH) 30 MCG: CPT

## 2021-01-12 ENCOUNTER — OFFICE VISIT (OUTPATIENT)
Dept: OBGYN CLINIC | Facility: CLINIC | Age: 46
End: 2021-01-12
Payer: OTHER MISCELLANEOUS

## 2021-01-12 ENCOUNTER — APPOINTMENT (OUTPATIENT)
Dept: RADIOLOGY | Facility: AMBULARY SURGERY CENTER | Age: 46
End: 2021-01-12
Payer: COMMERCIAL

## 2021-01-12 VITALS
BODY MASS INDEX: 37.32 KG/M2 | HEART RATE: 97 BPM | HEIGHT: 65 IN | WEIGHT: 224 LBS | DIASTOLIC BLOOD PRESSURE: 112 MMHG | SYSTOLIC BLOOD PRESSURE: 163 MMHG

## 2021-01-12 DIAGNOSIS — S99.921A PLANTAR PLATE INJURY, RIGHT, INITIAL ENCOUNTER: Primary | ICD-10-CM

## 2021-01-12 DIAGNOSIS — S99.921A PLANTAR PLATE INJURY, RIGHT, INITIAL ENCOUNTER: ICD-10-CM

## 2021-01-12 PROCEDURE — 99243 OFF/OP CNSLTJ NEW/EST LOW 30: CPT | Performed by: ORTHOPAEDIC SURGERY

## 2021-01-12 PROCEDURE — 29550 STRAPPING OF TOES: CPT | Performed by: ORTHOPAEDIC SURGERY

## 2021-01-12 PROCEDURE — 73620 X-RAY EXAM OF FOOT: CPT

## 2021-01-12 NOTE — PATIENT INSTRUCTIONS
Turf toe injury- Grade 2        How to Tape a Toe     Materials needed: 1" athletic tape and quick drying tape adhesive  For 1/2" tape, just split the 1" tape down the middle  1) Place the foot on a table  2) Apply one strip of tape about the midfoot  3) Apply ½" strip about the toe  4) Apply a figure 8 strip  Not too tight  5) Repeat with 3 more strips  6) Apply 1 strips about the bottom and side of toe, and overwrap as needed  All done! Dancers/Sesamoid Pads   Type into [employer]      "Performance[employer] Dancer/Sesamoid Pad - 1/8 inch Felt Right Foot (10)"

## 2021-01-12 NOTE — PROGRESS NOTES
BREN Alcaraz  Attending, Orthopaedic Surgery  [facility] [address] [facility] [address] [facility]        ORTHOPAEDIC FOOT AND ANKLE CLINIC VISIT     Assessment:     Encounter Diagnosis   Name Primary?  Plantar plate injury, right, initial encounter Yes       Plan:   · The patient verbalized understanding of exam findings and treatment plan  We engaged in the shared decision-making process and treatment options were discussed at length with the patient  Surgical and conservative management discussed today along with risks and benefits  · Patient has a partial right great toe plantar plate injury  · Patient may continue to be weight bearing as tolerated to the right foot in a cam walking boot at all times  · Taping reviewed with the patient and supplies provided to prevent dorsal subluxation of the proximal phalanx  · Agree with continued light duty work regimen  Return in about 4 weeks (around 2/9/2021)  History of Present Illness:   Chief Complaint: right great toe pain    Levy Forman is a 39 y o  male who is being seen for right great toe pain  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[address] and describes hyperextending his right foot with an axial load while performing a code at the end of November  He presented to [facility] where he had an x-ray which did not demonstrate a fracture  He was then seen by Dr Lobo Su who treated him with activity modification and ordered an MRI and subsequently referred him to our clinic after the MRI showed a plantar plate injury  At that time the patient was placed in a cam boot 12/24/20  The patient has been weight bearing as tolerated and states that his pain is better than prior, but he continues to have pain with weight bearing  Pain is localized at great toe MTP with minimal radiating and described as sharp and severe  Patient denies numbness, tingling or radicular pain  Denies history of neuropathy    Patient does not smoke, does not have diabetes and does not take blood thinners  Patient denies family history of anesthesia complications and has not had any complications with anesthesia  Pain/symptom timing:  Worse during the day when active  Pain/symptom context:  Worse with activites and work  Pain/symptom modifying factors:  Rest makes better, activities make worse  Pain/symptom associated signs/symptoms: none    Prior treatment   · NSAIDsYes    · Injections No   · Bracing/Orthotics Yes   · Physical Therapy No     Orthopedic Surgical History:   None    Past Medical, Surgical and Social History:  Past Medical History:  has a past medical history of Hypertension, Nasal disorder, 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 patient, and Sleep apnea  Problem List: does not have any pertinent problems on file  Past Surgical History:  has a past surgical history that includes Hernia repair  Family History: family history is not on file  Social History:  reports that he has never smoked  He has never used smokeless tobacco  He reports that he does not drink alcohol or use drugs  Current Medications: has a current medication list which includes the following prescription(s): albuterol, aspirin, atorvastatin, fluticasone, losartan, metoprolol succinate, and montelukast   Allergies: has No Known Allergies       Review of Systems:  General- denies fever/chills  HEENT- denies hearing loss or sore throat  Eyes- denies eye pain or visual disturbances, denies red eyes  Respiratory- denies cough or SOB  Cardio- denies chest pain or palpitations  GI- denies abdominal pain  Endocrine- denies urinary frequency  Urinary- denies pain with urination  Musculoskeletal- Negative except noted above  Skin- denies rashes or wounds  Neurological- denies dizziness or headache  Psychiatric- denies anxiety or difficulty concentrating    Physical Exam:   BP (!) 163/112   Pulse 97   Ht 5' 5" (1 651 m)   Wt 102 kg (224 lb)   BMI 37 28 kg/m²   General/Constitutional: No apparent distress: well-nourished and well developed  Eyes: normal ocular motion  Cardio: RRR, Normal S1S2, No m/r/g  Lymphatic: No appreciable lymphadenopathy  Respiratory: Non-labored breathing, CTA b/l no w/c/r  Vascular: No edema, swelling or tenderness, except as noted in detailed exam   Integumentary: No impressive skin lesions present, except as noted in detailed exam   Neuro: No ataxia or tremors noted  Psych: Normal mood and affect, oriented to person, place and time  Appropriate affect  Musculoskeletal: Normal, except as noted in detailed exam and in HPI  Examination    Right        Musculoskeletal Non Tender to palpation at 1st great toe MTP    Skin Normal       Nails Normal    Range of Motion  Normal tibiotalar and subtalar motion  No increased anterior/posterior translation of the 1st MTP compared to contralateral     Stability Stable    Muscle Strength 5/5 tibialis anterior  5/5 gastrocnemius-soleus  5/5 posterior tibialis  5/5 peroneal/eversion strength  5/5 EHL  5/5 FHL    Neurologic Normal    Sensation Intact to light touch throughout sural, saphenous, superficial peroneal, deep peroneal and medial/lateral plantar nerve distributions  [place] 5 07 filament (10g) testing  deferred  Cardiovascular Brisk capillary refill < 2 seconds,intact DP and PT pulses    Special Tests None      Imaging Studies:   2 views of the right and left ankle were taken, reviewed and interpreted independently that demonstrate minimal retraction of the sesamoids on the right compared to left  Reviewed by me personally  Strapping application    Date/Time: 1/12/2021 12:13 PM  Performed by: Shruti Covarrubias MD  Authorized by: Shruti Covarrubias MD   Universal Protocol:  Procedure performed by:  Consent: Verbal consent obtained    Risks and benefits: risks, benefits and alternatives were discussed  Consent given by: patient  Patient understanding: patient states understanding of the procedure being performed  Relevant documents: relevant documents present and verified  Radiology Images displayed and confirmed  If images not available, report reviewed: imaging studies available  Patient identity confirmed: verbally with patient      Pre-procedure details:     Sensation:  Normal  Procedure details:     Laterality:  Right    Location:  Toe    Toe:  R big toe    Strapping: yes      Supplies:  3 layer wrap  Post-procedure details:     Pain:  Unchanged    Sensation:  Normal    Patient tolerance of procedure: Tolerated well, no immediate complications      Dossie Chars Lachman, MD  Foot & Ankle Surgery   Department of [address] [place] [address]      I personally performed the service  Dossie Chars Lachman, MD

## 2021-01-12 NOTE — LETTER
January 13, 2021     Raciel Huizar MD  [address] [facility]  [address]  [address] [place] [address] [facility]    Patient: Delia Bustamante   YOB: 1975   Date of Visit: 1/12/2021       Dear Dr Mclean Frames: Thank you for referring Sixto Byrne to me for evaluation  Below are my notes for this consultation  If you have questions, please do not hesitate to call me  I look forward to following your patient along with you  Sincerely,        Judge Frederick MD        CC: No Recipients  Judge Frederick MD  1/12/2021  1:03 PM  Signed         BREN Olivarez  Attending, Orthopaedic Surgery  [facility] [address] [address] [facility]        ORTHOPAEDIC FOOT AND ANKLE CLINIC VISIT     Assessment:     Encounter Diagnosis   Name Primary?  Plantar plate injury, right, initial encounter Yes       Plan:   · The patient verbalized understanding of exam findings and treatment plan  We engaged in the shared decision-making process and treatment options were discussed at length with the patient  Surgical and conservative management discussed today along with risks and benefits  · Patient has a partial right great toe plantar plate injury  · Patient may continue to be weight bearing as tolerated to the right foot in a cam walking boot at all times  · Taping reviewed with the patient and supplies provided to prevent dorsal subluxation of the proximal phalanx  · Agree with continued light duty work regimen  Return in about 4 weeks (around 2/9/2021)  History of Present Illness:   Chief Complaint: right great toe pain    Delia Bustamante is a 39 y o  male who is being seen for right great toe pain  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 a code at the end of November  He presented to [facility] where he had an x-ray which did not demonstrate a fracture    He was then seen by Dr Giovanna Benson who treated him with activity modification and ordered an MRI and subsequently referred him to our clinic after the MRI showed a plantar plate injury  At that time the patient was placed in a cam boot 12/24/20  The patient has been weight bearing as tolerated and states that his pain is better than prior, but he continues to have pain with weight bearing  Pain is localized at great toe MTP with minimal radiating and described as sharp and severe  Patient denies numbness, tingling or radicular pain  Denies history of neuropathy  Patient does not smoke, does not have diabetes and does not take blood thinners  Patient denies family history of anesthesia complications and has not had any complications with anesthesia  Pain/symptom timing:  Worse during the day when active  Pain/symptom context:  Worse with activites and work  Pain/symptom modifying factors:  Rest makes better, activities make worse  Pain/symptom associated signs/symptoms: none    Prior treatment   · NSAIDsYes    · Injections No   · Bracing/Orthotics Yes   · Physical Therapy No     Orthopedic Surgical History:   None    Past Medical, Surgical and Social History:  Past Medical History:  has a past medical history of Hypertension, Nasal disorder, 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 patient, and Sleep apnea  Problem List: does not have any pertinent problems on file  Past Surgical History:  has a past surgical history that includes Hernia repair  Family History: family history is not on file  Social History:  reports that he has never smoked  He has never used smokeless tobacco  He reports that he does not drink alcohol or use drugs  Current Medications: has a current medication list which includes the following prescription(s): albuterol, aspirin, atorvastatin, fluticasone, losartan, metoprolol succinate, and montelukast   Allergies: has No Known Allergies       Review of Systems:  General- denies fever/chills  HEENT- denies hearing loss or sore throat  Eyes- denies eye pain or visual disturbances, denies red eyes  Respiratory- denies cough or SOB  Cardio- denies chest pain or palpitations  GI- denies abdominal pain  Endocrine- denies urinary frequency  Urinary- denies pain with urination  Musculoskeletal- Negative except noted above  Skin- denies rashes or wounds  Neurological- denies dizziness or headache  Psychiatric- denies anxiety or difficulty concentrating    Physical Exam:   BP (!) 163/112   Pulse 97   Ht 5' 5" (1 651 m)   Wt 102 kg (224 lb)   BMI 37 28 kg/m²   General/Constitutional: No apparent distress: well-nourished and well developed  Eyes: normal ocular motion  Cardio: RRR, Normal S1S2, No m/r/g  Lymphatic: No appreciable lymphadenopathy  Respiratory: Non-labored breathing, CTA b/l no w/c/r  Vascular: No edema, swelling or tenderness, except as noted in detailed exam   Integumentary: No impressive skin lesions present, except as noted in detailed exam   Neuro: No ataxia or tremors noted  Psych: Normal mood and affect, oriented to person, place and time  Appropriate affect  Musculoskeletal: Normal, except as noted in detailed exam and in HPI  Examination    Right        Musculoskeletal Non Tender to palpation at 1st great toe MTP    Skin Normal       Nails Normal    Range of Motion  Normal tibiotalar and subtalar motion  No increased anterior/posterior translation of the 1st MTP compared to contralateral     Stability Stable    Muscle Strength 5/5 tibialis anterior  5/5 gastrocnemius-soleus  5/5 posterior tibialis  5/5 peroneal/eversion strength  5/5 EHL  5/5 FHL    Neurologic Normal    Sensation Intact to light touch throughout sural, saphenous, superficial peroneal, deep peroneal and medial/lateral plantar nerve distributions  [place] 5 07 filament (10g) testing  deferred      Cardiovascular Brisk capillary refill < 2 seconds,intact DP and PT pulses    Special Tests None      Imaging Studies:   2 views of the right and left ankle were taken, reviewed and interpreted independently that demonstrate minimal retraction of the sesamoids on the right compared to left  Reviewed by me personally  Strapping application    Date/Time: 1/12/2021 12:13 PM  Performed by: Cheyenne Montano MD  Authorized by: Cheyenne Montano MD   Universal Protocol:  Procedure performed by:  Consent: Verbal consent obtained  Risks and benefits: risks, benefits and alternatives were discussed  Consent given by: patient  Patient understanding: patient states understanding of the procedure being performed  Relevant documents: relevant documents present and verified  Radiology Images displayed and confirmed  If images not available, report reviewed: imaging studies available  Patient identity confirmed: verbally with patient      Pre-procedure details:     Sensation:  Normal  Procedure details:     Laterality:  Right    Location:  Toe    Toe:  R big toe    Strapping: yes      Supplies:  3 layer wrap  Post-procedure details:     Pain:  Unchanged    Sensation:  Normal    Patient tolerance of procedure: Tolerated well, no immediate complications      Ignacio Killings Lachman, MD  Foot & Ankle Surgery   Department of [address] [place] [address]      I personally performed the service  Ignacio Killings Lachman, MD

## 2021-01-13 DIAGNOSIS — Z01.812 ENCOUNTER FOR PREPROCEDURE SCREENING LABORATORY TESTING FOR COVID-19: ICD-10-CM

## 2021-01-13 DIAGNOSIS — Z20.822 ENCOUNTER FOR PREPROCEDURE SCREENING LABORATORY TESTING FOR COVID-19: ICD-10-CM

## 2021-01-13 PROCEDURE — U0003 INFECTIOUS AGENT DETECTION BY NUCLEIC ACID (DNA OR RNA); SEVERE ACUTE RESPIRATORY SYNDROME CORONAVIRUS 2 (SARS-COV-2) (CORONAVIRUS DISEASE [COVID-19]), AMPLIFIED PROBE TECHNIQUE, MAKING USE OF HIGH THROUGHPUT TECHNOLOGIES AS DESCRIBED BY CMS-2020-01-R: HCPCS | Performed by: INTERNAL MEDICINE

## 2021-01-13 PROCEDURE — U0005 INFEC AGEN DETEC AMPLI PROBE: HCPCS | Performed by: INTERNAL MEDICINE

## 2021-01-14 ENCOUNTER — TELEPHONE (OUTPATIENT)
Dept: PULMONOLOGY | Facility: CLINIC | Age: 46
End: 2021-01-14

## 2021-01-14 DIAGNOSIS — G47.33 OSA (OBSTRUCTIVE SLEEP APNEA): Primary | ICD-10-CM

## 2021-01-14 LAB — SARS-COV-2 RNA SPEC QL NAA+PROBE: NOT DETECTED

## 2021-02-09 ENCOUNTER — OFFICE VISIT (OUTPATIENT)
Dept: OBGYN CLINIC | Facility: CLINIC | Age: 46
End: 2021-02-09
Payer: OTHER MISCELLANEOUS

## 2021-02-09 VITALS
WEIGHT: 224 LBS | SYSTOLIC BLOOD PRESSURE: 169 MMHG | BODY MASS INDEX: 37.32 KG/M2 | HEIGHT: 65 IN | DIASTOLIC BLOOD PRESSURE: 102 MMHG | HEART RATE: 93 BPM

## 2021-02-09 DIAGNOSIS — S99.921A PLANTAR PLATE INJURY, RIGHT, INITIAL ENCOUNTER: Primary | ICD-10-CM

## 2021-02-09 PROCEDURE — 99213 OFFICE O/P EST LOW 20 MIN: CPT | Performed by: ORTHOPAEDIC SURGERY

## 2021-02-09 RX ORDER — NAPROXEN 500 MG/1
220 TABLET ORAL
COMMUNITY
Start: 2020-12-11 | End: 2021-04-21 | Stop reason: HOSPADM

## 2021-02-09 NOTE — PATIENT INSTRUCTIONS
You may begin weaning your boot and transitioning to a sneaker (2/9)  It is important to do this gradually to avoid aggravating the healing process  1  2/9, you may come out of the boot into a sneaker for 2 hours  2  2/10, you may come out of the boot into a sneaker for 4 hours,  3  The next day, you may come out of the boot into a sneaker for 6 hours  4  Continue this (adding 2 hours per day) as you tolerate  For example, if you do 6 hours out of the boot into a sneaker and your foot swells more than usual at night and it is difficult to control the discomfort, do not advance to 8 hours the next day, stay at 6 hours until you are able to tolerate it  Elevation, Ice and tylenol and staying off of it at night will be important to aide in this transition out of the boot  Swelling and soreness are normal as you begin to do more with the injured leg  Continue toe taping  Continue J pad  Continue stiff soled, supportive shoes    Go to [employer], type in "[employer] extension othotic" buy the appropriate size and place under the insert in your shoe  This will make the transition from Boot to sneaker easier  [employer], [employer] Balance, Hey[employer] are good brands but I recommend going to a dedicate shoe store (not [employer] or [employer] ) At these types of stores, they have experts that can fit you for shoes appropriate for your foot problem  [employer]  [address] [facility], [address]  [address], [address] [facility]  [address]     [employer]  [address] [facility] [address], [place], [address] [place] [address] [address] [facility] [place] [address]

## 2021-02-09 NOTE — LETTER
February 9, 2021     Patient: Brisa Baxter   YOB: 1975   Date of Visit: 2/9/2021       To Whom it May Concern:    Willie Madrigal is under my professional care  He was seen in my office on 2/9/2021  He may return to work on light/sedentary duty only  We will update his work status at his next evaluation in 2 months  If you have any questions or concerns, please don't hesitate to call  Sincerely,          Adrienne Doyt MD        CC: Lela Carrero

## 2021-02-09 NOTE — PROGRESS NOTES
BREN Cope  Attending, Orthopaedic Surgery  [facility] [address] [facility]      ORTHOPAEDIC FOOT AND ANKLE CLINIC VISIT     Assessment:     Encounter Diagnosis   Name Primary?  Plantar plate injury, right, initial encounter Yes            Plan:   · The patient verbalized understanding of exam findings and treatment plan  We engaged in the shared decision-making process and treatment options were discussed at length with the patient  Surgical and conservative management discussed today along with risks and benefits  · Hernan Vang is now 3 months from his plantar plate injury  · We provided a protocol to wean out of the boot into a supportive sneaker  · Proper shoe gear was discussed and list of shoe stores where provided  · Advised patient to purchase a renee's extension orthotic for support  · Continue toe taping  · Continue J pad  · Work nite was provided in the clinic today  Return in about 2 months (around 4/9/2021) for Recheck  History of Present Illness:   Chief Complaint:   Chief Complaint   Patient presents with   Jose Deep is a 39 y o  male who is being seen in follow-up for Right plantar plate injury  When we last saw he we recommended he WBAT in a cam boot at all times  Suggested taping the toe to prevent dorsal sublaxation of the proximal phalanx    Pain has  improved  Residual pain is localized at plantar plate with minimal radiating and described as sharp and severe        Pain/symptom timing:  Worse during the day when active  Pain/symptom context:  Worse with activites and work  Pain/symptom modifying factors:  Rest makes better, activities make worse  Pain/symptom associated signs/symptoms: none    Prior treatment   · NSAIDsYes   · Injections No   · Bracing/Orthotics Yes    · Physical Therapy No     Orthopedic Surgical History:   See Below    Past Medical, Surgical and Social History:  Past Medical History:  has a past medical history of Hypertension, Nasal disorder, 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 patient, and Sleep apnea  Problem List: does not have any pertinent problems on file  Past Surgical History:  has a past surgical history that includes Hernia repair  Family History: family history is not on file  Social History:  reports that he has never smoked  He has never used smokeless tobacco  He reports that he does not drink alcohol or use drugs  Current Medications: has a current medication list which includes the following prescription(s): albuterol, aspirin, atorvastatin, fluticasone, losartan, metoprolol succinate, montelukast, and naproxen  Allergies: has No Known Allergies  Review of Systems:  General- denies fever/chills  HEENT- denies hearing loss or sore throat  Eyes- denies eye pain or visual disturbances, denies red eyes  Respiratory- denies cough or SOB  Cardio- denies chest pain or palpitations  GI- denies abdominal pain  Endocrine- denies urinary frequency  Urinary- denies pain with urination  Musculoskeletal- Negative except noted above  Skin- denies rashes or wounds  Neurological- denies dizziness or headache  Psychiatric- denies anxiety or difficulty concentrating    Physical Exam:   BP (!) 169/102   Pulse 93   Ht 5' 5" (1 651 m)   Wt 102 kg (224 lb)   BMI 37 28 kg/m²   General/Constitutional: No apparent distress: well-nourished and well developed  Eyes: normal ocular motion  Lymphatic: No appreciable lymphadenopathy  Respiratory: Non-labored breathing  Vascular: No edema, swelling or tenderness, except as noted in detailed exam   Integumentary: No impressive skin lesions present, except as noted in detailed exam   Neuro: No ataxia or tremors noted  Psych: Normal mood and affect, oriented to person, place and time  Appropriate affect  Musculoskeletal: Normal, except as noted in detailed exam and in HPI      Examination    Right    Gait Normal   Musculoskeletal Tender to palpation at plantar plate Skin Normal       Nails Normal    Range of Motion  20 degrees dorsiflexion, 50 degrees plantarflexion  Subtalar motion: Normal    Stability Stable    Muscle Strength 5/5 tibialis anterior  5/5 gastrocnemius-soleus  5/5 posterior tibialis  5/5 peroneal/eversion strength  5/5 EHL  5/5 FHL    Neurologic Normal    Sensation  Intact to light touch throughout sural, saphenous, superficial peroneal, deep peroneal and medial/lateral plantar nerve distributions  [place] 5 07 filament (10g) testing  deferred  Cardiovascular Brisk capillary refill < 2 seconds,intact DP and PT pulses    Special Tests None      Imaging Studies:   No new imaging        James R Lachman, MD  Foot & Ankle Surgery   Department of [address] [place] [address]      I personally performed the service  Umberto Diamond Lachman, MD    Scribe Attestation    I,:  Kaylyn Grove MA am acting as a scribe while in the presence of the attending physician :       I,:  Mary Phelps MD personally performed the services described in this documentation    as scribed in my presence :

## 2021-02-19 ENCOUNTER — TELEPHONE (OUTPATIENT)
Dept: SLEEP CENTER | Facility: CLINIC | Age: 46
End: 2021-02-19

## 2021-02-19 NOTE — TELEPHONE ENCOUNTER
----- Message from Cristiana Richards MD sent at 1/20/2021 10:55 AM EST -----  Approved  ----- Message -----  From: Duy Arvizu  Sent: 1/18/2021   3:17 PM EST  To: Sleep Medicine [facility] Provider    This sleep study needs approval      If approved please sign and return to clerical pool  If denied please include reasons why  Also provide alternative testing if warranted  Please sign and return to clerical pool

## 2021-02-21 ENCOUNTER — HOSPITAL ENCOUNTER (OUTPATIENT)
Dept: SLEEP CENTER | Facility: CLINIC | Age: 46
Discharge: HOME | End: 2021-02-21
Payer: COMMERCIAL

## 2021-02-21 DIAGNOSIS — G47.33 OSA (OBSTRUCTIVE SLEEP APNEA): ICD-10-CM

## 2021-02-21 PROCEDURE — G0399 HOME SLEEP TEST/TYPE 3 PORTA: HCPCS | Performed by: INTERNAL MEDICINE

## 2021-02-21 PROCEDURE — G0399 HOME SLEEP TEST/TYPE 3 PORTA: HCPCS

## 2021-02-22 NOTE — PROGRESS NOTES
Home Sleep Study Documentation    Pre-Sleep Home Study:    Set-up and instructions performed by:[email or web address]    Technician performed demonstration for Patient: yes    Return demonstration performed by Patient: yes    Written instructions provided to Patient: yes    Patient signed consent form: yes        Post-Sleep Home Study:    Additional comments by Patient: None    Home Sleep Study Failed:no:    Failure reason: N/A    Reported or Detected: N/A    Scored by: OPAL Pacheco

## 2021-02-23 DIAGNOSIS — G47.33 OSA (OBSTRUCTIVE SLEEP APNEA): Primary | ICD-10-CM

## 2021-02-25 ENCOUNTER — TELEPHONE (OUTPATIENT)
Dept: SLEEP CENTER | Facility: CLINIC | Age: 46
End: 2021-02-25

## 2021-02-25 NOTE — TELEPHONE ENCOUNTER
Sleep study resulted  Moderate MANOJ  APAP ordered  Patient is seen by Dr Yolanda Bradford in the [place] pulmonary office  Has follow up scheduled with Dr Yolanda Bradford 3/8/21  Will need to send message to pulmonary office to reschedule follow up to 31-90 days after patient gets APAP  Will need Rx for APAP sent to DME provider of patient's choice

## 2021-03-05 ENCOUNTER — TELEPHONE (OUTPATIENT)
Dept: PULMONOLOGY | Facility: CLINIC | Age: 46
End: 2021-03-05

## 2021-03-05 NOTE — TELEPHONE ENCOUNTER
Called and spoke with Marlys Leavitt from [facility], she informed me that they did not have the CPAP order  I have emailed the order along with office notes and sleep study to her today so she able to expedite the order  I have also called and informed pt of this as well  He was happy about this and will call us again if he still does not hear anything from [facility]

## 2021-03-05 NOTE — TELEPHONE ENCOUNTER
I called patient this morning to confirm his appt for Monday  3/8/21 and he told me has hasnt received his cpap equipment from [facility]  Can you please check on this and call the patient with an update

## 2021-03-08 ENCOUNTER — OFFICE VISIT (OUTPATIENT)
Dept: PULMONOLOGY | Facility: CLINIC | Age: 46
End: 2021-03-08
Payer: COMMERCIAL

## 2021-03-08 VITALS
TEMPERATURE: 97.1 F | SYSTOLIC BLOOD PRESSURE: 140 MMHG | DIASTOLIC BLOOD PRESSURE: 100 MMHG | WEIGHT: 224 LBS | HEART RATE: 90 BPM | HEIGHT: 65 IN | OXYGEN SATURATION: 99 % | BODY MASS INDEX: 37.32 KG/M2

## 2021-03-08 DIAGNOSIS — R07.9 CHEST PAIN, UNSPECIFIED TYPE: ICD-10-CM

## 2021-03-08 DIAGNOSIS — E66.09 CLASS 2 OBESITY DUE TO EXCESS CALORIES WITHOUT SERIOUS COMORBIDITY WITH BODY MASS INDEX (BMI) OF 36.0 TO 36.9 IN ADULT: ICD-10-CM

## 2021-03-08 DIAGNOSIS — I10 ESSENTIAL (PRIMARY) HYPERTENSION: ICD-10-CM

## 2021-03-08 DIAGNOSIS — E78.5 HYPERLIPIDEMIA, UNSPECIFIED HYPERLIPIDEMIA TYPE: ICD-10-CM

## 2021-03-08 DIAGNOSIS — R05.3 CHRONIC COUGH: ICD-10-CM

## 2021-03-08 DIAGNOSIS — G47.33 OSA (OBSTRUCTIVE SLEEP APNEA): ICD-10-CM

## 2021-03-08 DIAGNOSIS — J45.30 MILD PERSISTENT ASTHMA WITHOUT COMPLICATION: Primary | ICD-10-CM

## 2021-03-08 DIAGNOSIS — R94.39 ABNORMAL NUCLEAR STRESS TEST: ICD-10-CM

## 2021-03-08 PROCEDURE — 99214 OFFICE O/P EST MOD 30 MIN: CPT | Performed by: INTERNAL MEDICINE

## 2021-03-08 RX ORDER — METOPROLOL SUCCINATE 50 MG/1
50 TABLET, EXTENDED RELEASE ORAL DAILY
Qty: 90 TABLET | Refills: 3 | Status: SHIPPED | OUTPATIENT
Start: 2021-03-08

## 2021-03-08 RX ORDER — LOSARTAN POTASSIUM 50 MG/1
50 TABLET ORAL DAILY
Qty: 90 TABLET | Refills: 3 | Status: SHIPPED | OUTPATIENT
Start: 2021-03-08

## 2021-03-08 NOTE — ASSESSMENT & PLAN NOTE
- Improved  - Still having SOB although unclear if this is due to his asthma  Based on history, suspect ongoing SOB is due to weight gain and sedentary lifestyle  Plan to continue ICS for now  Re-evaluate breathing once MANOJ is treated, activity level has increased and pt has lost weight  Then consider change to ICS/LABA if needed  - Cont  ICS  Rinse mouth after use  - Albuterol as needed, which is rare  - cont  Montelukast nightly

## 2021-03-08 NOTE — PROGRESS NOTES
Pulmonary Follow Up Note   Susan Kahn 39 y o  male MRN: [id number]  3/8/2021      Referring provider: Dr Kacie Cummings and Plan:    Mild persistent asthma without complication  - Improved  - Still having SOB although unclear if this is due to his asthma  Based on history, suspect ongoing SOB is due to weight gain and sedentary lifestyle  Plan to continue ICS for now  Re-evaluate breathing once MANOJ is treated, activity level has increased and pt has lost weight  Then consider change to ICS/LABA if needed  - Cont  ICS  Rinse mouth after use  - Albuterol as needed, which is rare  - cont  Montelukast nightly  MANOJ (obstructive sleep apnea)  - Start Auto-PAP when he obtains machine  - Discussed mask options- plans to trial full face mask due to septal deviation   - Will check compliance at next visit    Essential (primary) hypertension  - Refilled Metoprolol and Losartan  He will follow up with Dr Marjorie Doe  Class 2 obesity due to excess calories with body mass index (BMI) of 36 0 to 36 9 in adult  - discussed need for weight loss  Recommended he try to lose 20 pounds  Recommended meal planning and portion control  Chronic cough  - Improved with treatment of asthma  Diagnoses and all orders for this visit:    Mild persistent asthma without complication    MANOJ (obstructive sleep apnea)    Essential (primary) hypertension  -     losartan (COZAAR) 50 mg tablet; Take 1 tablet (50 mg total) by mouth daily  -     metoprolol succinate (TOPROL-XL) 50 mg 24 hr tablet; Take 1 tablet (50 mg total) by mouth daily    Class 2 obesity due to excess calories without serious comorbidity with body mass index (BMI) of 36 0 to 36 9 in adult    Chronic cough    Abnormal nuclear stress test    Chest pain, unspecified type  -     losartan (COZAAR) 50 mg tablet; Take 1 tablet (50 mg total) by mouth daily    Hyperlipidemia, unspecified hyperlipidemia type  -     losartan (COZAAR) 50 mg tablet;  Take 1 tablet (50 mg total) by mouth daily      Plan of care discussed with patient in detail  Concerns and questions addressed  Return for follow-up within 90 days to evaluate compliance with auto PAP  History of Present Illness   HPI:  John Santo is a 39 y o  male who presents for follow up of his asthma and obstructive sleep apnea  He has a tear in his foot ligament and has been on light duty  He reports his cough is better with Singulair nightly  He is using his Flovent twice daily  Rinses mouth after use  Has not needed Albuterol inhaler at all  He gets SOB with conversation and with walking while talking or going up stairs  He is wheezing less  He thinks he has seasonal allergies  He has gained weight  He attributes it to poor diet choices  Had recent sleep study and was told today that his autoPAP will be ready 5-7 days  Review of Systems   positive as mentioned above and negative otherwise    Historical Information   Past Medical History:   Diagnosis Date    Hypertension     Nasal disorder     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 patient     Sleep apnea      Past Surgical History:   Procedure Laterality Date    HERNIA REPAIR       No family history on file        Meds/Allergies     Current Outpatient Medications:     albuterol (Ventolin HFA) 90 mcg/act inhaler, Inhale 2 puffs every 6 (six) hours as needed for wheezing, Disp: 18 g, Rfl: 3    aspirin (ECOTRIN LOW STRENGTH) 81 mg EC tablet, Take 81 mg by mouth daily, Disp: , Rfl:     atorvastatin (LIPITOR) 40 mg tablet, Take 1 tablet (40 mg total) by mouth daily, Disp: 90 tablet, Rfl: 3    fluticasone (FLOVENT HFA) 110 MCG/ACT inhaler, Inhale 2 puffs 2 (two) times a day Rinse mouth after use , Disp: 1 Inhaler, Rfl: 3    montelukast (SINGULAIR) 10 mg tablet, Take 1 tablet (10 mg total) by mouth daily at bedtime, Disp: 30 tablet, Rfl: 3    naproxen (NAPROSYN) 500 mg tablet, 220 mg , Disp: , Rfl:     losartan (COZAAR) 50 mg tablet, Take 1 tablet (50 mg total) by mouth daily (Patient not taking: Reported on 3/8/2021), Disp: 90 tablet, Rfl: 3    metoprolol succinate (TOPROL-XL) 50 mg 24 hr tablet, Take 50 mg by mouth daily, Disp: , Rfl: 1  No Known Allergies    Vitals: Blood pressure 140/100, pulse 90, temperature (!) 97 1 °F (36 2 °C), temperature source Temporal, height 5' 5" (1 651 m), weight 102 kg (224 lb), SpO2 99 %  Body mass index is 37 28 kg/m²  Oxygen Therapy  SpO2: 99 %  Oxygen Therapy: None (Room air)      Physical Exam   GEN: WDWN, nad, comfortable  HEENT: NCAT, EOMI  CVS: Regular, no m/r/g  LUNGS: CTA b/l, no w/r/r  ABD: soft, obese, nd  EXT: No c/c/e  NEURO: No focal deficits  MS: Moving all extremities  SKIN: warm, dry  PSYCH: calm, cooperative      Labs: I have personally reviewed pertinent lab results  Lab Results   Component Value Date    WBC 7 81 12/09/2019    HGB 14 2 12/09/2019    HCT 42 1 12/09/2019    MCV 90 12/09/2019     12/09/2019     Lab Results   Component Value Date    GLUCOSE 91 12/15/2015    CALCIUM 8 9 11/23/2019     12/15/2015    K 4 9 11/23/2019    CO2 29 11/23/2019     11/23/2019    BUN 21 11/23/2019    CREATININE 1 15 11/23/2019     No results found for: IGE  Lab Results   Component Value Date    ALT 24 11/23/2019    AST 18 11/23/2019    ALKPHOS 80 11/23/2019    BILITOT 0 50 12/15/2015       Pulmonary function testing: Spirometry 2020 per my review is normal     EKG, Pathology, and Other Studies: I have personally reviewed pertinent reports  ECHO 1/20: EF 60%    PSG 2/21: moderate MANOJ, auto-PAP recommended    [facility]

## 2021-03-08 NOTE — ASSESSMENT & PLAN NOTE
- discussed need for weight loss  Recommended he try to lose 20 pounds  Recommended meal planning and portion control

## 2021-03-08 NOTE — ASSESSMENT & PLAN NOTE
- Start Auto-PAP when he obtains machine  - Discussed mask options- plans to trial full face mask due to septal deviation   - Will check compliance at next visit

## 2021-04-09 ENCOUNTER — TELEPHONE (OUTPATIENT)
Dept: OBGYN CLINIC | Facility: CLINIC | Age: 46
End: 2021-04-09

## 2021-04-09 ENCOUNTER — OFFICE VISIT (OUTPATIENT)
Dept: OBGYN CLINIC | Facility: CLINIC | Age: 46
End: 2021-04-09
Payer: OTHER MISCELLANEOUS

## 2021-04-09 VITALS
SYSTOLIC BLOOD PRESSURE: 150 MMHG | HEART RATE: 66 BPM | BODY MASS INDEX: 37.32 KG/M2 | WEIGHT: 224 LBS | HEIGHT: 65 IN | DIASTOLIC BLOOD PRESSURE: 90 MMHG

## 2021-04-09 DIAGNOSIS — S92.811A CLOSED FRACTURE OF SESAMOID BONE OF RIGHT FOOT, INITIAL ENCOUNTER: ICD-10-CM

## 2021-04-09 DIAGNOSIS — S99.921D PLANTAR PLATE INJURY, RIGHT, SUBSEQUENT ENCOUNTER: Primary | ICD-10-CM

## 2021-04-09 PROCEDURE — 99213 OFFICE O/P EST LOW 20 MIN: CPT | Performed by: ORTHOPAEDIC SURGERY

## 2021-04-09 RX ORDER — METHYLPREDNISOLONE 4 MG/1
TABLET ORAL
Qty: 1 EACH | Refills: 0 | Status: SHIPPED | OUTPATIENT
Start: 2021-04-09 | End: 2021-04-19

## 2021-04-09 NOTE — TELEPHONE ENCOUNTER
Vaccine Information Statement(s) was given today. This has been reviewed, questions answered, and verbal consent given by Patient for injection(s) and administration of Pneumococcal Polysaccharide (PPSV).     We sent it  It should be available within the hour

## 2021-04-09 NOTE — TELEPHONE ENCOUNTER
Dr Jamaal Swanson would like to know when you will send the medrodose pack to [facility]  Please call him [phone]    Thank you

## 2021-04-09 NOTE — PROGRESS NOTES
BREN Gorman  Attending, Orthopaedic Surgery  [facility] [address] [address] [facility]      ORTHOPAEDIC FOOT AND ANKLE CLINIC VISIT     Assessment:     Encounter Diagnoses   Name Primary?  Plantar plate injury, right, subsequent encounter Yes    Closed fracture of sesamoid bone of right foot, initial encounter             Plan:   · The patient verbalized understanding of exam findings and treatment plan  We engaged in the shared decision-making process and treatment options were discussed at length with the patient  Surgical and conservative management discussed today along with risks and benefits  · Palma Robles has had no improvement in his pain since his last visit 2 months ago  This means that in 5 months since his injury, the pain is exactly the same with no improvement  · We will order a repeat MRI to evaluate the healing of the plantar plate tear and look for edema in the tibial sesamoid associated with stress fracture noted on previous MRI  · We will discuss surgical options at next visit, which would include either excision of the tibial sesamoid or 1st MTP arthrodesis  Return in about 1 week (around 4/16/2021) for MRI follow up  History of Present Illness:   Chief Complaint:   Chief Complaint   Patient presents with   Yolande Van is a 42 y o  male who is being seen in follow-up for Right great toe plantar plate partial rupture  When we last saw he we recommended weaning out of cam boot and into a supportive sneaker  Pain has not improved  Residual pain is localized at plantar aspect of 1st MTP joint and tibial sesamoid with radiation dorsally and described as burning and severe        Pain/symptom timing:  Worse during the day when active  Pain/symptom context:  Worse with activites and work  Pain/symptom modifying factors:  Rest makes better, activities make worse  Pain/symptom associated signs/symptoms: none    Prior treatment   · NSAIDsYes · Injections No   · Bracing/Orthotics Yes - cam boot, renee's extension orthotic, j-pad, and toe taping  · Physical Therapy No     Orthopedic Surgical History:   See below     Past Medical, Surgical and Social History:  Past Medical History:  has a past medical history of Hypertension, Nasal disorder, 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 patient, and Sleep apnea  Problem List: does not have any pertinent problems on file  Past Surgical History:  has a past surgical history that includes Hernia repair  Family History: family history is not on file  Social History:  reports that he has never smoked  He has never used smokeless tobacco  He reports that he does not drink alcohol or use drugs  Current Medications: has a current medication list which includes the following prescription(s): albuterol, aspirin, atorvastatin, fluticasone, losartan, metoprolol succinate, montelukast, and naproxen  Allergies: has No Known Allergies  Review of Systems:  General- denies fever/chills  HEENT- denies hearing loss or sore throat  Eyes- denies eye pain or visual disturbances, denies red eyes  Respiratory- denies cough or SOB  Cardio- denies chest pain or palpitations  GI- denies abdominal pain  Endocrine- denies urinary frequency  Urinary- denies pain with urination  Musculoskeletal- Negative except noted above  Skin- denies rashes or wounds  Neurological- denies dizziness or headache  Psychiatric- denies anxiety or difficulty concentrating    Physical Exam:   /90   Pulse 66   Ht 5' 5" (1 651 m)   Wt 102 kg (224 lb)   BMI 37 28 kg/m²   General/Constitutional: No apparent distress: well-nourished and well developed    Eyes: normal ocular motion  Lymphatic: No appreciable lymphadenopathy  Respiratory: Non-labored breathing  Vascular: No edema, swelling or tenderness, except as noted in detailed exam   Integumentary: No impressive skin lesions present, except as noted in detailed exam   Neuro: No ataxia or tremors noted  Psych: Normal mood and affect, oriented to person, place and time  Appropriate affect  Musculoskeletal: Normal, except as noted in detailed exam and in HPI  Examination    Right    Gait Normal   Musculoskeletal Tender to palpation at plantar aspect of 1st MTP joint and tibial sesamoid    Skin Normal       Nails Normal    Range of Motion  Full range of motion of the 1st MTP joint  Negative anterior drawer of the 1st MTP joint compared to contralateral side    Stability Stable    Muscle Strength 5/5 tibialis anterior  5/5 gastrocnemius-soleus  5/5 posterior tibialis  5/5 peroneal/eversion strength  5/5 EHL  5/5 FHL    Neurologic Normal    Sensation  Intact to light touch throughout sural, saphenous, superficial peroneal, deep peroneal and medial/lateral plantar nerve distributions  [place] 5 07 filament (10g) testing  deferred  Cardiovascular Brisk capillary refill < 2 seconds,intact DP and PT pulses    Special Tests None      Imaging Studies:   No new imaging    Scribe Attestation    I,:  Marianne Mohan PA-C am acting as a scribe while in the presence of the attending physician :       I,:  Avni Avila MD personally performed the services described in this documentation    as scribed in my presence :               Newt Adas Lachman, MD  Foot & Ankle Surgery   Department of [address] [place] [address]      I personally performed the service  Newt Adas Lachman, MD

## 2021-04-12 ENCOUNTER — HOSPITAL ENCOUNTER (OUTPATIENT)
Dept: RADIOLOGY | Facility: HOSPITAL | Age: 46
Discharge: HOME | End: 2021-04-12
Payer: COMMERCIAL

## 2021-04-12 DIAGNOSIS — S99.921D PLANTAR PLATE INJURY, RIGHT, SUBSEQUENT ENCOUNTER: ICD-10-CM

## 2021-04-12 DIAGNOSIS — S92.811A CLOSED FRACTURE OF SESAMOID BONE OF RIGHT FOOT, INITIAL ENCOUNTER: ICD-10-CM

## 2021-04-12 PROCEDURE — G1004 CDSM NDSC: HCPCS

## 2021-04-12 PROCEDURE — 73718 MRI LOWER EXTREMITY W/O DYE: CPT

## 2021-04-16 ENCOUNTER — ANESTHESIA EVENT (OUTPATIENT)
Dept: PERIOP | Facility: AMBULARY SURGERY CENTER | Age: 46
End: 2021-04-16
Payer: OTHER MISCELLANEOUS

## 2021-04-16 ENCOUNTER — OFFICE VISIT (OUTPATIENT)
Dept: OBGYN CLINIC | Facility: CLINIC | Age: 46
End: 2021-04-16
Payer: OTHER MISCELLANEOUS

## 2021-04-16 VITALS
SYSTOLIC BLOOD PRESSURE: 179 MMHG | DIASTOLIC BLOOD PRESSURE: 93 MMHG | WEIGHT: 224 LBS | HEART RATE: 83 BPM | HEIGHT: 65 IN | BODY MASS INDEX: 37.32 KG/M2

## 2021-04-16 DIAGNOSIS — S92.811K CLOSED FRACTURE OF SESAMOID BONE OF RIGHT FOOT WITH NONUNION, SUBSEQUENT ENCOUNTER: Primary | ICD-10-CM

## 2021-04-16 DIAGNOSIS — M20.11 HALLUX INTERPHALANGEUS, ACQUIRED, RIGHT: ICD-10-CM

## 2021-04-16 PROCEDURE — 99213 OFFICE O/P EST LOW 20 MIN: CPT | Performed by: ORTHOPAEDIC SURGERY

## 2021-04-16 RX ORDER — CHLORHEXIDINE GLUCONATE 4 G/100ML
SOLUTION TOPICAL DAILY PRN
Status: CANCELLED | OUTPATIENT
Start: 2021-04-16

## 2021-04-16 RX ORDER — CHLORHEXIDINE GLUCONATE 0.12 MG/ML
15 RINSE ORAL ONCE
Status: CANCELLED | OUTPATIENT
Start: 2021-04-16 | End: 2021-04-16

## 2021-04-16 NOTE — PROGRESS NOTES
BREN Brian  Attending, Orthopaedic Surgery  [facility] [address] [facility]      ORTHOPAEDIC FOOT AND ANKLE CLINIC VISIT     Assessment:     Encounter Diagnoses   Name Primary? 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, subsequent encounter Yes    Hallux interphalangeus, acquired, right             Plan:   · The patient verbalized understanding of exam findings and treatment plan  We engaged in the shared decision-making process and treatment options were discussed at length with the patient  Surgical and conservative management discussed today along with risks and benefits  · His repeat MRI suggest tibial sesamoid AVN  His plantar plate injury appears to have healed  · We will excise his tibia sesamoid in the OR and perform and AKIN osteotomy as his HVA is already 25 degrees and could potentially drift with this surgery  · Consent signed in clinic  · Return in about 3 weeks (around 5/7/2021)  CONSENT FOR BONY PROCEDURES:   Patient understands that there is no guarantee that the surgery will relieve all of His pain and also understands that there may be a prolonged course of protected weight-bearing status required which will restrict them from driving and other activities as discussed at today's visit  Patient recognizes that there are risks with surgery including bleeding, numbness, nerve irritation, wound complications, infection, continued pain, joint stiffness, malunion, nonunion, anesthetic complications, death, failure of procedure and possible need for further surgery  The patient understands that there is no guarantee that this surgery will relieve all of His pain and symptoms  Patient understands that there is no guarantee that they will return to full function after the procedure  Patient has provided informed consent for the procedure  History of Present Illness:   Chief Complaint: 1st MTP plantar pain  Citlalli Rowell is a 42 y  o  male who is being seen in follow-up for Right 1st MTP pain  When we last saw he we recommended MRI  Pain has not improved  Residual pain is localized at tibial sesamoid with minimal radiating and described as sharp and severe  Pain/symptom timing:  Worse during the day when active  Pain/symptom context:  Worse with activites and work  Pain/symptom modifying factors:  Rest makes better, activities make worse  Pain/symptom associated signs/symptoms: none    Prior treatment   · NSAIDsYes   · Injections No   · Bracing/Orthotics Yes    · Physical Therapy Yes     Orthopedic Surgical History:   See below    Past Medical, Surgical and Social History:  Past Medical History:  has a past medical history of Hypertension, Nasal disorder, 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 patient, and Sleep apnea  Problem List: does not have any pertinent problems on file  Past Surgical History:  has a past surgical history that includes Hernia repair  Family History: family history is not on file  Social History:  reports that he has never smoked  He has never used smokeless tobacco  He reports that he does not drink alcohol or use drugs  Current Medications: has a current medication list which includes the following prescription(s): albuterol, aspirin, atorvastatin, fluticasone, losartan, methylprednisolone, metoprolol succinate, montelukast, and naproxen  Allergies: has No Known Allergies       Review of Systems:  General- denies fever/chills  HEENT- denies hearing loss or sore throat  Eyes- denies eye pain or visual disturbances, denies red eyes  Respiratory- denies cough or SOB  Cardio- denies chest pain or palpitations  GI- denies abdominal pain  Endocrine- denies urinary frequency  Urinary- denies pain with urination  Musculoskeletal- Negative except noted above  Skin- denies rashes or wounds  Neurological- denies dizziness or headache  Psychiatric- denies anxiety or difficulty concentrating    Physical Exam:   BP (!) 179/93 Pulse 83   Ht 5' 5" (1 651 m)   Wt 102 kg (224 lb)   BMI 37 28 kg/m²   General/Constitutional: No apparent distress: well-nourished and well developed  Eyes: normal ocular motion  Lymphatic: No appreciable lymphadenopathy  Respiratory: Non-labored breathing  Vascular: No edema, swelling or tenderness, except as noted in detailed exam   Integumentary: No impressive skin lesions present, except as noted in detailed exam   Neuro: No ataxia or tremors noted  Psych: Normal mood and affect, oriented to person, place and time  Appropriate affect  Musculoskeletal: Normal, except as noted in detailed exam and in HPI  Examination    Right    Gait Normal   Musculoskeletal Tender to palpation at tibial sesamoid    Skin Normal       Nails Normal    Range of Motion  1st MTP -20 degrees dorsiflexion before pain, 30 degrees plantarflexion  Subtalar motion: normal    Stability Stable    Muscle Strength 5/5 tibialis anterior  5/5 gastrocnemius-soleus  5/5 posterior tibialis  5/5 peroneal/eversion strength  5/5 EHL  5/5 FHL    Neurologic Normal    Sensation  Intact to light touch throughout sural, saphenous, superficial peroneal, deep peroneal and medial/lateral plantar nerve distributions  [place] 5 07 filament (10g) testing deferred  Cardiovascular Brisk capillary refill < 2 seconds,intact DP and PT pulses    Special Tests None      Imaging Studies:     MRI available for review of Right foot which demonstrates tibial sesamoid AVN with collapse  Reviewed by me personally  Sherral Mires Lachman, MD  Foot & Ankle Surgery   Department of [address] [place] [address]      I personally performed the service  Sherral Mires Lachman, MD

## 2021-04-16 NOTE — H&P (VIEW-ONLY)
BREN Mi  Attending, Orthopaedic Surgery  [facility] [address] [facility]      ORTHOPAEDIC FOOT AND ANKLE CLINIC VISIT     Assessment:     Encounter Diagnoses   Name Primary? 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, subsequent encounter Yes    Hallux interphalangeus, acquired, right             Plan:   · The patient verbalized understanding of exam findings and treatment plan  We engaged in the shared decision-making process and treatment options were discussed at length with the patient  Surgical and conservative management discussed today along with risks and benefits  · His repeat MRI suggest tibial sesamoid AVN  His plantar plate injury appears to have healed  · We will excise his tibia sesamoid in the OR and perform and AKIN osteotomy as his HVA is already 25 degrees and could potentially drift with this surgery  · Consent signed in clinic  · Return in about 3 weeks (around 5/7/2021)  CONSENT FOR BONY PROCEDURES:   Patient understands that there is no guarantee that the surgery will relieve all of His pain and also understands that there may be a prolonged course of protected weight-bearing status required which will restrict them from driving and other activities as discussed at today's visit  Patient recognizes that there are risks with surgery including bleeding, numbness, nerve irritation, wound complications, infection, continued pain, joint stiffness, malunion, nonunion, anesthetic complications, death, failure of procedure and possible need for further surgery  The patient understands that there is no guarantee that this surgery will relieve all of His pain and symptoms  Patient understands that there is no guarantee that they will return to full function after the procedure  Patient has provided informed consent for the procedure  History of Present Illness:   Chief Complaint: 1st MTP plantar pain  Nubenjie Harrisonr is a 42 y  o  male who is being seen in follow-up for Right 1st MTP pain  When we last saw he we recommended MRI  Pain has not improved  Residual pain is localized at tibial sesamoid with minimal radiating and described as sharp and severe  Pain/symptom timing:  Worse during the day when active  Pain/symptom context:  Worse with activites and work  Pain/symptom modifying factors:  Rest makes better, activities make worse  Pain/symptom associated signs/symptoms: none    Prior treatment   · NSAIDsYes   · Injections No   · Bracing/Orthotics Yes    · Physical Therapy Yes     Orthopedic Surgical History:   See below    Past Medical, Surgical and Social History:  Past Medical History:  has a past medical history of Hypertension, Nasal disorder, 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 patient, and Sleep apnea  Problem List: does not have any pertinent problems on file  Past Surgical History:  has a past surgical history that includes Hernia repair  Family History: family history is not on file  Social History:  reports that he has never smoked  He has never used smokeless tobacco  He reports that he does not drink alcohol or use drugs  Current Medications: has a current medication list which includes the following prescription(s): albuterol, aspirin, atorvastatin, fluticasone, losartan, methylprednisolone, metoprolol succinate, montelukast, and naproxen  Allergies: has No Known Allergies       Review of Systems:  General- denies fever/chills  HEENT- denies hearing loss or sore throat  Eyes- denies eye pain or visual disturbances, denies red eyes  Respiratory- denies cough or SOB  Cardio- denies chest pain or palpitations  GI- denies abdominal pain  Endocrine- denies urinary frequency  Urinary- denies pain with urination  Musculoskeletal- Negative except noted above  Skin- denies rashes or wounds  Neurological- denies dizziness or headache  Psychiatric- denies anxiety or difficulty concentrating    Physical Exam:   BP (!) 179/93 Pulse 83   Ht 5' 5" (1 651 m)   Wt 102 kg (224 lb)   BMI 37 28 kg/m²   General/Constitutional: No apparent distress: well-nourished and well developed  Eyes: normal ocular motion  Lymphatic: No appreciable lymphadenopathy  Respiratory: Non-labored breathing  Vascular: No edema, swelling or tenderness, except as noted in detailed exam   Integumentary: No impressive skin lesions present, except as noted in detailed exam   Neuro: No ataxia or tremors noted  Psych: Normal mood and affect, oriented to person, place and time  Appropriate affect  Musculoskeletal: Normal, except as noted in detailed exam and in HPI  Examination    Right    Gait Normal   Musculoskeletal Tender to palpation at tibial sesamoid    Skin Normal       Nails Normal    Range of Motion  1st MTP -20 degrees dorsiflexion before pain, 30 degrees plantarflexion  Subtalar motion: normal    Stability Stable    Muscle Strength 5/5 tibialis anterior  5/5 gastrocnemius-soleus  5/5 posterior tibialis  5/5 peroneal/eversion strength  5/5 EHL  5/5 FHL    Neurologic Normal    Sensation  Intact to light touch throughout sural, saphenous, superficial peroneal, deep peroneal and medial/lateral plantar nerve distributions  [place] 5 07 filament (10g) testing deferred  Cardiovascular Brisk capillary refill < 2 seconds,intact DP and PT pulses    Special Tests None      Imaging Studies:     MRI available for review of Right foot which demonstrates tibial sesamoid AVN with collapse  Reviewed by me personally  Angelina Cinnamon Lachman, MD  Foot & Ankle Surgery   Department of [address] [place] [address]      I personally performed the service  Angelina Cinnamon Lachman, MD

## 2021-04-16 NOTE — LETTER
April 16, 2021     Patient: Leonel Prado   YOB: 1975   Date of Visit: 4/16/2021       To Whom it May Concern:    Estefany Pope is under my professional care  He was seen in my office on 4/16/2021  He is scheduled for foot surgery 4/21/21  He will not be cleared to return to work without restrictions for 4 months after the surgery  He may return to desk duty/ sedentary duty 10-12 weeks after the surgery  If you have any questions or concerns, please don't hesitate to call  Sincerely,          Luis E Kelly MD        CC: Chrissy Rabago

## 2021-04-16 NOTE — PATIENT INSTRUCTIONS
BREN Brian  Attending, [address] [place] [address] Office Phone: [phone] ? Fax: [phone]  Reji Middleton Office Phone: [phone] ? BXI:[phone]    : Casey Borja, [address] [facility]     Surgery Coordinators [facility]: [facility], [address] [facility], [phone]  Surgery Coordinator Liliana:  Ayala Crews [address], [phone]  www [email or web address]   PRE-OPERATIVE AND POST-OPERATIVE INSTRUCTIONS    General Information:   Your surgery is with Dr Ngozi Arzate  Dates can change (although rare) depending on emergencies   Typical post operative visits are at the following intervals:  3 weeks post surgery(except 1 week for bunions and wound monitoring), 6 weeks post surgery, 3 months post surgery, 6 months post surgery, and then on a yearly basis  However, this may change based on Dr Yung Amaral recommendation   #1 post-operative rule for foot/ankle surgery:  ONCE YOU ARE OUT OF YOUR CAST AND/OR REMOVABLE BOOT, SWELLING MAY PERSIST FOR MANY MONTHS  YOU MIGHT ALSO EXPERIENCE A BLUISH DISCOLORATION OF YOUR LEG  THIS IS NORMAL AND PART OF THE USUAL POSTOPERATIVE EXPERIENCE  SMOKING:   Smoking results in incomplete healing of fractures (broken bones) and joints that my have been fused  Smoking and nicotine also prevents the growth of bone into ankle replacements and bone healing  It also slows the healing of muscles and skin (soft tissue)  Therefore, please do not have surgery if you continue to smoke  We reserve the right to cancel your surgery if we suspect that you are smoking  DO NOT use nicorette gum or other patches  Please find an alternative method to quit smoking before your surgery  Pre-Operative Information:   Surgery date and preoperative visits:  a   If you have medical problems, such as an abnormal EKG, history of BLOOD CLOT, ANEURYSM, and any other heart condition, please inform us so that we can get your medical clearance several weeks before the surgery  Please bring any important medical information, such as an EKG, chest x-ray, or echocardiogram, with you to ensure that your surgery will not be delayed  b  If needed, you will receive your preoperative appointments in the mail or by phone from our scheduling office  The location of the preoperative appointment will be given to you also   c  You may not eat after midnight the night before surgery  If you do, your surgery will be cancelled  d   Angelique Abad will receive a phone call from your surgery center the day before your surgery (if your surgery is on a Monday, you will get a call the Friday before)  If you do not hear from someone by 4pm the day before your surgery, please call the Surgical coordinator (number above) to notify us   e  Start taking Vitamin D3 4000 units per day and Calcium 1200mg per day immediately  You will continue this until your 3 month post-op visit  These are over the counter and available at all pharmacies and supermarkets  f  FOR THOSE HAVING SURGERY AT [address] [place] [address] WILL NEED CRUTCHES OR A ROLLING WALKER AFTER SURGERY, ASK FOR A PRESCRIPTION FOR THIS FROM OUR OFFICE TODAY  THIS CANNOT BE HANDLED THE DAY OF SURGERY AS [facility] DOES NOT STOCK THESE   Because bacterial can often enter any defect in the skin, it is important to avoid any cuts before surgery  Any breaks in the skin on the leg will often result in your surgery being postponed  Please avoid going on a very long walk the day prior to surgery, or doing other activities that could lead to irritation of the skin, including yard work, extra athletic activity, or shaving  This could result in surgery cancellation   You MUST be fasting the day of your surgery    Therefore, please do not consume any foot or beverage after midnight the night before surgery  The morning of surgery you may take your usual medications with a sip of water   It is important not to take anti-inflammatory medication like Ibuprofen, Motrin, Naproxen (Aleve), or Aspirin 7-10 days before surgery because they will make you bleed more than usual   Vitamin, E, Plavix and Coumadin also have the same effect  Stop Aspirin and Vitamin E two weeks before surgery  YOUR MEDICAL DOCTOR SHOULD TELL YOU WHEN TO STOP COUMADIN OR PLAVIX   If your surgery involves any bone healing, please do not take anti-inflammatories for at least 6 weeks after surgery  This can impede bone healing (ibuprofen, Aleve, Relafen, iodine)  Tylenol is fine to take  PREOPERATIVE BATHING INSTRUCTIONS:     Before your surgery, bathe with Hibiclens (4% Chlorhexidene) as instructed below  This skin cleanser will help reduce the bacteria on your skin before surgery  To avoid irritating your eyes, do not apply Hibiclens above the level of your neck   o On the evening before AND the morning of surgery, bathe your entire body except the face and scalp, then rinse freely  o DO NOT apply to your face or scalp, as Hibiclens can irritate your eyes   Purchasing information:   Hibiclens is available without a prescription at [facility]  ADDITIONAL INSTRUCTIONS:  PATIENTS HAVING FOOT/ANKLE SURGERY     In preparation for your upcoming surgery, we kindly request and advise the following:   Notify our office if you are taking any of the following:  Coumadin (warfarin):  Persantine (dipyridamole); Pletal (cilostazol); Plavix (clopidogrel); Ticlid (ticlopidine); Agrylin (anagrelide); Aggrenox (dipyridamole and aspirin) or other blood thinners,   In addition, stop taking Vitamin E and herbal supplements   Do not schedule any elective dental work for at least 6 months after surgery    If you had an ankle replacement, you will need to take antibiotics before any future dental procedures  Your dentist or our office can prescribe these for you  1000mg of Amoxicillin 1 hour prior to any dental procedure is the recommended dosing  THREE RULES:    1  After surgery you will most likely be given the instructions KEEP YOUR TOES ABOVE YOUR NOSE    This means that you MUST have your feet elevated higher than your heart  Keeping your toes above your nose helps to heal the muscles and skin (soft tissues) by reducing swelling in your leg  This position also helps to prevent infection, and is very important in avoiding deep venous thrombosis (blood clots)  2  In order to keep the blood circulating in your legs and in order to avoid deep vein   thrombosis (blood clots), we ask patients to GET UP ONCE AN HOUR during the day  This means you should at least cross the room and come back  It does not mean you have to be up for long periods of time  In most cases we will not have people immediately put any weight on their operated part  This is important to prevent loosening of metal or other devices holding the bones together  It also prevents irritation of the soft tissues which can lead to prolonged healing  When we say get up once an hour, please walk, hop or move with an assisted device  This is important! 3  Do not do any excessive walking during the first few days after surgery  Recovering from surgery is a full-time task for the patient  Postoperative care is important to avoid irritating the skin incision, which can lead to infection  Please do not plan activities or go out of town for several weeks after surgery  If you are unsure about your future activities, please schedule surgery only when you know it is acceptable for you  Scheduling surgery and then canceling the date, prevents other people from having surgery on that date as it takes time to line everything up effectively    If you cancel your surgery the week of your planned surgery, we reserve the right to cancel all future surgical procedures  THE DAY OF SURGERY:     Arrival to the hospital or outpatient surgical center on time is imperative  If you arrive late, then your surgery will be cancelled  You MUST have a family member/friend bring you, stay with you throughout the DURATION of your surgery, and drive you home   You MUST be fasting the day of your surgery  Therefore, do not consume any food or beverage after midnight the night before surgery  At your pre-operative visit with the anesthesia staff, or during your phone screen, a nurse will instruct you what medications you will need to take the day of surgery   MAKE SURE THAT THE PHARMACY LISTED IN THE ELECTRONIC MEDICAL RECORD (EPIC) IS YOUR PREFERRED PHARMACY  For example, if you are staying with family or a friend, and will not be near your preferred pharmacy, YOU MUST, tell the nurses checking you in the day of surgery so that this can be changed in the system  If your prescriptions are sent to a pharmacy, this cannot be changed  AFTER YOUR SURGERY:   Bleeding through the bandage almost always occurs  Do not let this alarm you  Simply add more gauze or a towel, call us, and come in for a dressing change  If you think it is excessive, contact us immediately or go to the local emergency room   Do not get the bandage wet  Showering is possible with plastic protectors  Be very careful, as the bathroom can be wet and slippery  If you do get your dressing wet, it should be changed immediately  Please contact us   ONCE YOUR ARE OUT OF YOUR CAST AND/OR REMOVABLE BOOT, SWELLING MAY PERSIST FOR MANY MONTHS  YOU MIGHT ALSO EXPERIENCE A BLUISH DISCOLORATION OF YOUR LEG  THIS IS NORMAL AND PART OF THE USUAL POSTOPERATIVE EXPERIENCE  WEARING COMPRESSION HOSE (ELASTIC STOCKINGS) CAN HELP AVOID SOME OF THIS SWELLING        DRESSING:   The purpose of the surgical dressing is to keep your wound and the surgical site protected from the environment  Most dressings contain splints, which help to hold your foot and ankle in a corrected position, and also allow the surgical site to heal properly  Dressings will remain in place and undisturbed until the first postop visit  If you have a drain in place, this will need to be removed in 1-3 days after surgery  The time for the drain to be pulled will be written on your discharge instruction sheet  CAST  INSTRUCTIONS:  You may or may not get a cast following surgery  If you do, pay close attention to the following:     After application of a splint or cast, it is very important to elevate your leg for 24 to 72 hours  The injured area should be elevated well above the heart  Remember Toes above your Nose  Rest and elevation greatly reduce pain and speed the healing process by minimizing early swelling  CALL YOUR DOCTORS OFFICE OR VISIT LOCATION EMERGENCY ROOM IF YOU HAVE ANY OF THE FOLLOWING:     Significant increased pain, which may be caused by swelling, and the feeling that the splint or cast is too tight   Numbness and tingling in your hand or foot, which may be caused by too much pressure on the nerves   Burning and stinging, which may be caused by too much pressure on the skin   Excessive swelling below the cast, which may mean the cast is slowing your blood circulation   Loss of active movement of toes, which request an urgent evaluation   Loss of capillary refill  Pinch the tip of toes and teri the skin  Release pressure and if the skin does not return pink then call the office immediately  DO NOT GET YOUR CAST WET  Bacteria thrive in moist dark areas  We do not want this  If your cast becomes wet, return to the office and we will apply another one  PAIN AFTER SURGERY:  Narcotic pain medication can and will depress your respiratory system if taken in excess  The goal of pain management with narcotics is to be comfortable not pain free    If you take enough narcotics to be pain free then you run the risk of stopping breathing  If this happens, call 911 immediately!  Pain in the heel is often  caused by pressure from the weight of your foot on the bed  Make sure your heel is suspended off the bed by keeping a pillow underneath your calf not your knee  Medications: You will be given narcotic pain medication  Do NOT drive while taking narcotic medications  Medications such as Darvocet, Percocet, Vicoden or Tylenol #3, also contain acetaminophen (Tylenol)  Do not take acetaminophen or Tylenol from home when taking theses medications  When you fill your prescription, you may ask the pharmacist if your pain medication has acetaminophen/Tylenol in it  It is okay to take Tylenol with Oxycontin/Oxycodone  Should you have pain after taking your prescription medication, ibuprophen (Motrin, Advil, and Alleve) is a common over the counter preparation and may often be taken with the prescription pain medication as long as you take them with food  These medications can irritate the stomach lining  Unless you are allergic to aspirin or currently taking a blood thinner, Dr Mari Orr patients are requested to take one 325 mg aspirin every 12 hours until you are back to walking normally after surgery (This can be up to 6 weeks)  Narcotic medications commonly cause nausea  Taking them with food will decrease this side effect  If you are having extreme nausea, please contact us for an alternative medication or for something that can be taken with this medication to decrease the nausea  Also, narcotic medications frequently cause constipation  An increase of fiber, fruits and vegetables in your diet may alleviate this problem, or if necessary, you may use an over-the-counter medication such as senekot, colace, or Fibercon for constipation problems  You should resume all medications you were taking prior to the surgery unless otherwise specified       Activity:   Because of your recent foot surgery, your activity level will decrease  You will need to elevate your foot ABOVE the level of your heart for a minimum of four days  The length of time necessary for the swelling to go down, and for your wounds to heal properly depends greatly on your efforts here  Elevation is extremely important to avoid compromising the blood supply to your foot  Remember when your foot is down it will swell, which will increase pain and slow healing  Wiggle your toes frequently if possible  If you go home with a regional block, (a type of anesthesia) the foot and leg will be numb  Think of ways to get into your house and around the house until the block wears off  Keep in mind that it may be a legal issue if you drive while in a cast or splint, especially when the splint is on the right foot  You may call the Department of Motor Vehicles to schedule a road test if you have adaptive equipment applied to your car  The amount of weight you are allowed to bear on your foot will be written on your discharge sheet filled out at the time of surgery  The following is an explanation of the possibilities:        Heel-only weight bearing:   Usually, this order is given for use with a special shoe only, which will help you to put weight only on your heel  You may bear your body weight on your foot, as long as it is only borne on your heel

## 2021-04-19 NOTE — PRE-PROCEDURE INSTRUCTIONS
Pre-Surgery Instructions:   Medication Instructions    albuterol (Ventolin HFA) 90 mcg/act inhaler Instructed to take as needed including DOS    aspirin (ECOTRIN LOW STRENGTH) 81 mg EC tablet Patient was instructed by Physician and understands   atorvastatin (LIPITOR) 40 mg tablet Instructed to take per normal schedule    fluticasone (FLOVENT HFA) 110 MCG/ACT inhaler Instructed to take per normal schedule including DOS    losartan (COZAAR) 50 mg tablet Instructed to take per normal schedule except DOS    metoprolol succinate (TOPROL-XL) 50 mg 24 hr tablet Instructed to take per normal schedule including DOS with sips water    montelukast (SINGULAIR) 10 mg tablet Instructed to take per normal schedule    Pre op,medications and showering instructions reviewed-Patient has hibiclens  Instructed to avoid all NSAIDs and OTC Vit/Supp from now until after surgery  Tylenol ok prn  Pt  States he was instructed to stop his ASA 1 week prior to surgery  Pt  Verbalized an understanding of all instructions reviewed and offers no concerns at this time

## 2021-04-21 ENCOUNTER — HOSPITAL ENCOUNTER (OUTPATIENT)
Facility: AMBULARY SURGERY CENTER | Age: 46
Setting detail: OUTPATIENT SURGERY
Discharge: HOME | End: 2021-04-21
Attending: ORTHOPAEDIC SURGERY | Admitting: ORTHOPAEDIC SURGERY
Payer: OTHER MISCELLANEOUS

## 2021-04-21 ENCOUNTER — APPOINTMENT (OUTPATIENT)
Dept: RADIOLOGY | Facility: AMBULARY SURGERY CENTER | Age: 46
End: 2021-04-21
Payer: OTHER MISCELLANEOUS

## 2021-04-21 ENCOUNTER — ANESTHESIA (OUTPATIENT)
Dept: PERIOP | Facility: AMBULARY SURGERY CENTER | Age: 46
End: 2021-04-21
Payer: OTHER MISCELLANEOUS

## 2021-04-21 VITALS
DIASTOLIC BLOOD PRESSURE: 77 MMHG | HEART RATE: 69 BPM | OXYGEN SATURATION: 98 % | HEIGHT: 65 IN | RESPIRATION RATE: 18 BRPM | TEMPERATURE: 97.5 F | BODY MASS INDEX: 36.99 KG/M2 | SYSTOLIC BLOOD PRESSURE: 133 MMHG | WEIGHT: 222 LBS

## 2021-04-21 DIAGNOSIS — M20.11 HALLUX INTERPHALANGEUS, ACQUIRED, RIGHT: Primary | ICD-10-CM

## 2021-04-21 DIAGNOSIS — S92.811K CLOSED FRACTURE OF SESAMOID BONE OF RIGHT FOOT WITH NONUNION, SUBSEQUENT ENCOUNTER: Primary | ICD-10-CM

## 2021-04-21 DIAGNOSIS — S92.811K CLOSED FRACTURE OF SESAMOID BONE OF RIGHT FOOT WITH NONUNION, SUBSEQUENT ENCOUNTER: ICD-10-CM

## 2021-04-21 PROCEDURE — 88304 TISSUE EXAM BY PATHOLOGIST: CPT | Performed by: PATHOLOGY

## 2021-04-21 PROCEDURE — C1713 ANCHOR/SCREW BN/BN,TIS/BN: HCPCS | Performed by: ORTHOPAEDIC SURGERY

## 2021-04-21 PROCEDURE — 88311 DECALCIFY TISSUE: CPT | Performed by: PATHOLOGY

## 2021-04-21 PROCEDURE — 28298 COR HLX VLGS PRX PHLX OSTEOT: CPT | Performed by: ORTHOPAEDIC SURGERY

## 2021-04-21 PROCEDURE — C9290 INJ, BUPIVACAINE LIPOSOME: HCPCS | Performed by: ORTHOPAEDIC SURGERY

## 2021-04-21 PROCEDURE — 73620 X-RAY EXAM OF FOOT: CPT

## 2021-04-21 DEVICE — STAPLE DYNANITE NITI W/INSTRUS 9W X 10L: Type: IMPLANTABLE DEVICE | Status: FUNCTIONAL

## 2021-04-21 RX ORDER — ASPIRIN 325 MG
325 TABLET, DELAYED RELEASE (ENTERIC COATED) ORAL 2 TIMES DAILY
Qty: 90 TABLET | Refills: 0 | Status: SHIPPED | OUTPATIENT
Start: 2021-04-21

## 2021-04-21 RX ORDER — ACETAMINOPHEN 325 MG/1
650 TABLET ORAL EVERY 6 HOURS PRN
Status: DISCONTINUED | OUTPATIENT
Start: 2021-04-21 | End: 2021-04-21 | Stop reason: HOSPADM

## 2021-04-21 RX ORDER — EPHEDRINE SULFATE 50 MG/ML
INJECTION INTRAVENOUS AS NEEDED
Status: DISCONTINUED | OUTPATIENT
Start: 2021-04-21 | End: 2021-04-21

## 2021-04-21 RX ORDER — PROPOFOL 10 MG/ML
INJECTION, EMULSION INTRAVENOUS AS NEEDED
Status: DISCONTINUED | OUTPATIENT
Start: 2021-04-21 | End: 2021-04-21

## 2021-04-21 RX ORDER — VANCOMYCIN HYDROCHLORIDE 1 G/20ML
INJECTION, POWDER, LYOPHILIZED, FOR SOLUTION INTRAVENOUS AS NEEDED
Status: DISCONTINUED | OUTPATIENT
Start: 2021-04-21 | End: 2021-04-21 | Stop reason: HOSPADM

## 2021-04-21 RX ORDER — DEXAMETHASONE SODIUM PHOSPHATE 4 MG/ML
INJECTION, SOLUTION INTRA-ARTICULAR; INTRALESIONAL; INTRAMUSCULAR; INTRAVENOUS; SOFT TISSUE AS NEEDED
Status: DISCONTINUED | OUTPATIENT
Start: 2021-04-21 | End: 2021-04-21

## 2021-04-21 RX ORDER — GLYCOPYRROLATE 0.2 MG/ML
INJECTION INTRAMUSCULAR; INTRAVENOUS AS NEEDED
Status: DISCONTINUED | OUTPATIENT
Start: 2021-04-21 | End: 2021-04-21

## 2021-04-21 RX ORDER — CHLORHEXIDINE GLUCONATE 4 G/100ML
SOLUTION TOPICAL DAILY PRN
Status: DISCONTINUED | OUTPATIENT
Start: 2021-04-21 | End: 2021-04-21 | Stop reason: HOSPADM

## 2021-04-21 RX ORDER — CEFAZOLIN SODIUM 1 G/50ML
1000 SOLUTION INTRAVENOUS ONCE
Status: COMPLETED | OUTPATIENT
Start: 2021-04-21 | End: 2021-04-21

## 2021-04-21 RX ORDER — MAGNESIUM HYDROXIDE 1200 MG/15ML
LIQUID ORAL AS NEEDED
Status: DISCONTINUED | OUTPATIENT
Start: 2021-04-21 | End: 2021-04-21 | Stop reason: HOSPADM

## 2021-04-21 RX ORDER — FENTANYL CITRATE 50 UG/ML
INJECTION, SOLUTION INTRAMUSCULAR; INTRAVENOUS AS NEEDED
Status: DISCONTINUED | OUTPATIENT
Start: 2021-04-21 | End: 2021-04-21

## 2021-04-21 RX ORDER — SODIUM CHLORIDE, SODIUM LACTATE, POTASSIUM CHLORIDE, CALCIUM CHLORIDE 600; 310; 30; 20 MG/100ML; MG/100ML; MG/100ML; MG/100ML
50 INJECTION, SOLUTION INTRAVENOUS CONTINUOUS
Status: DISCONTINUED | OUTPATIENT
Start: 2021-04-21 | End: 2021-04-21 | Stop reason: HOSPADM

## 2021-04-21 RX ORDER — LIDOCAINE HYDROCHLORIDE 10 MG/ML
0.5 INJECTION, SOLUTION EPIDURAL; INFILTRATION; INTRACAUDAL; PERINEURAL ONCE AS NEEDED
Status: DISCONTINUED | OUTPATIENT
Start: 2021-04-21 | End: 2021-04-21 | Stop reason: HOSPADM

## 2021-04-21 RX ORDER — ONDANSETRON 2 MG/ML
4 INJECTION INTRAMUSCULAR; INTRAVENOUS EVERY 6 HOURS PRN
Status: DISCONTINUED | OUTPATIENT
Start: 2021-04-21 | End: 2021-04-21 | Stop reason: HOSPADM

## 2021-04-21 RX ORDER — BUPIVACAINE HYDROCHLORIDE 2.5 MG/ML
INJECTION, SOLUTION EPIDURAL; INFILTRATION; INTRACAUDAL AS NEEDED
Status: DISCONTINUED | OUTPATIENT
Start: 2021-04-21 | End: 2021-04-21 | Stop reason: HOSPADM

## 2021-04-21 RX ORDER — LIDOCAINE HYDROCHLORIDE 10 MG/ML
INJECTION, SOLUTION EPIDURAL; INFILTRATION; INTRACAUDAL; PERINEURAL AS NEEDED
Status: DISCONTINUED | OUTPATIENT
Start: 2021-04-21 | End: 2021-04-21

## 2021-04-21 RX ORDER — ONDANSETRON 4 MG/1
4 TABLET, ORALLY DISINTEGRATING ORAL EVERY 6 HOURS PRN
Qty: 20 TABLET | Refills: 0 | Status: SHIPPED | OUTPATIENT
Start: 2021-04-21

## 2021-04-21 RX ORDER — OXYCODONE HYDROCHLORIDE 5 MG/1
5 TABLET ORAL EVERY 4 HOURS PRN
Qty: 30 TABLET | Refills: 0 | Status: SHIPPED | OUTPATIENT
Start: 2021-04-21 | End: 2021-05-01

## 2021-04-21 RX ORDER — CHLORHEXIDINE GLUCONATE 0.12 MG/ML
15 RINSE ORAL ONCE
Status: DISCONTINUED | OUTPATIENT
Start: 2021-04-21 | End: 2021-04-21 | Stop reason: HOSPADM

## 2021-04-21 RX ORDER — FENTANYL CITRATE/PF 50 MCG/ML
25 SYRINGE (ML) INJECTION
Status: DISCONTINUED | OUTPATIENT
Start: 2021-04-21 | End: 2021-04-21 | Stop reason: HOSPADM

## 2021-04-21 RX ORDER — ONDANSETRON 2 MG/ML
4 INJECTION INTRAMUSCULAR; INTRAVENOUS ONCE AS NEEDED
Status: DISCONTINUED | OUTPATIENT
Start: 2021-04-21 | End: 2021-04-21 | Stop reason: HOSPADM

## 2021-04-21 RX ORDER — ONDANSETRON 2 MG/ML
INJECTION INTRAMUSCULAR; INTRAVENOUS AS NEEDED
Status: DISCONTINUED | OUTPATIENT
Start: 2021-04-21 | End: 2021-04-21

## 2021-04-21 RX ORDER — OXYCODONE HYDROCHLORIDE 5 MG/1
5 TABLET ORAL EVERY 4 HOURS PRN
Status: DISCONTINUED | OUTPATIENT
Start: 2021-04-21 | End: 2021-04-21 | Stop reason: HOSPADM

## 2021-04-21 RX ORDER — MIDAZOLAM HYDROCHLORIDE 2 MG/2ML
INJECTION, SOLUTION INTRAMUSCULAR; INTRAVENOUS AS NEEDED
Status: DISCONTINUED | OUTPATIENT
Start: 2021-04-21 | End: 2021-04-21

## 2021-04-21 RX ADMIN — PHENYLEPHRINE HYDROCHLORIDE 100 MCG: 10 INJECTION INTRAVENOUS at 13:15

## 2021-04-21 RX ADMIN — OXYCODONE HYDROCHLORIDE 5 MG: 5 TABLET ORAL at 14:58

## 2021-04-21 RX ADMIN — PHENYLEPHRINE HYDROCHLORIDE 100 MCG: 10 INJECTION INTRAVENOUS at 12:58

## 2021-04-21 RX ADMIN — DEXAMETHASONE SODIUM PHOSPHATE 4 MG: 4 INJECTION INTRA-ARTICULAR; INTRALESIONAL; INTRAMUSCULAR; INTRAVENOUS; SOFT TISSUE at 12:47

## 2021-04-21 RX ADMIN — ONDANSETRON 4 MG: 2 INJECTION INTRAMUSCULAR; INTRAVENOUS at 12:47

## 2021-04-21 RX ADMIN — SODIUM CHLORIDE, SODIUM LACTATE, POTASSIUM CHLORIDE, AND CALCIUM CHLORIDE 50 ML/HR: .6; .31; .03; .02 INJECTION, SOLUTION INTRAVENOUS at 10:58

## 2021-04-21 RX ADMIN — PHENYLEPHRINE HYDROCHLORIDE 200 MCG: 10 INJECTION INTRAVENOUS at 13:20

## 2021-04-21 RX ADMIN — LIDOCAINE HYDROCHLORIDE 50 MG: 10 INJECTION, SOLUTION EPIDURAL; INFILTRATION; INTRACAUDAL at 12:39

## 2021-04-21 RX ADMIN — GLYCOPYRROLATE 0.2 MG: 0.2 INJECTION, SOLUTION INTRAMUSCULAR; INTRAVENOUS at 12:39

## 2021-04-21 RX ADMIN — FENTANYL CITRATE 25 MCG: 50 INJECTION, SOLUTION INTRAMUSCULAR; INTRAVENOUS at 12:44

## 2021-04-21 RX ADMIN — EPHEDRINE SULFATE 10 MG: 50 INJECTION, SOLUTION INTRAVENOUS at 13:22

## 2021-04-21 RX ADMIN — PHENYLEPHRINE HYDROCHLORIDE 100 MCG: 10 INJECTION INTRAVENOUS at 13:10

## 2021-04-21 RX ADMIN — MIDAZOLAM HYDROCHLORIDE 2 MG: 1 INJECTION, SOLUTION INTRAMUSCULAR; INTRAVENOUS at 12:36

## 2021-04-21 RX ADMIN — FENTANYL CITRATE 25 MCG: 50 INJECTION, SOLUTION INTRAMUSCULAR; INTRAVENOUS at 12:40

## 2021-04-21 RX ADMIN — CEFAZOLIN SODIUM 2000 MG: 1 SOLUTION INTRAVENOUS at 12:33

## 2021-04-21 RX ADMIN — PROPOFOL 200 MG: 10 INJECTION, EMULSION INTRAVENOUS at 12:40

## 2021-04-21 RX ADMIN — FENTANYL CITRATE 50 MCG: 50 INJECTION, SOLUTION INTRAMUSCULAR; INTRAVENOUS at 13:01

## 2021-04-21 NOTE — ANESTHESIA PREPROCEDURE EVALUATION
Procedure:  Josepha Wallowa, And tibial sesamoid excision (Right Foot)    Relevant Problems   ANESTHESIA (within normal limits)      CARDIO   (+) Essential (primary) hypertension      PULMONARY   (+) Mild persistent asthma without complication   (+) MANOJ (obstructive sleep apnea)      Other   (+) Class 2 obesity due to excess calories with body mass index (BMI) of 36 0 to 36 9 in adult        Physical Exam    Airway    Mallampati score: I         Dental   No notable dental hx     Cardiovascular  Cardiovascular exam normal    Pulmonary  Pulmonary exam normal     Other Findings        Anesthesia Plan  ASA Score- 2     Anesthesia Type- general with ASA Monitors  Additional Monitors:   Airway Plan: LMA  Comment: I, Za Bills MD, discussed risks (reviewed with patient on the anesthesia consent form), benefits and alternatives for General Anesthesia  These risks did include breathing tube remaining in place if not strong enough, PONV, damage to lips and teeth, sore throat, eye injury or blindness, risk of heart attack or stroke that may lead to death          Plan Factors-Exercise tolerance (METS): >4 METS  Chart reviewed  Patient summary reviewed  Induction- intravenous  Postoperative Plan- Plan for postoperative opioid use  Informed Consent- Anesthetic plan and risks discussed with patient  I personally reviewed this patient with the CRNA  Discussed and agreed on the Anesthesia Plan with the CRNA  Genaro Pickens

## 2021-04-21 NOTE — DISCHARGE INSTRUCTIONS
BREN Foley  Attending, [address] [place] [address] Office Phone: [phone] ? Fax: [phone]  [address] [place] [address] Office Phone: [phone] ? KIR:[phone]    : Deborah Millan [place], [place]     Surgery Coordinators Judson Pacheco: Kiara Rodriguez, [address] [facility], [phone]  Surgery Coordinator Liliana:  Alberto Lynn, [phone]                                                             Anusha Gipson, [phone]  www [email or web address]   PRE-OPERATIVE AND POST-OPERATIVE INSTRUCTIONS    General Information:   Typical post operative visits are at the following intervals:  2-3 weeks post surgery, 6 weeks post surgery, 3 months post surgery, 6 months post surgery, and then on a yearly basis  However, this may change based on Dr Paris Hameed recommendation   #1 post-operative rule for foot/ankle surgery:  ONCE YOU ARE OUT OF YOUR CAST AND/OR REMOVABLE BOOT, SWELLING MAY PERSIST FOR MANY MONTHS  YOU MIGHT ALSO EXPERIENCE A BLUISH DISCOLORATION OF YOUR LEG  THIS IS NORMAL AND PART OF THE USUAL POSTOPERATIVE EXPERIENCE    DO NOT WAIT UNTIL YOUR BLOCK WEARS OFF TO TAKE YOUR PAIN MEDICATION  IT TAKES A FEW DOSES OF THE PAIN MEDICATION TO REACH A THERAPEUTIC LEVEL  TAKE A TABLET PROACTIVELY BEFORE YOU HAVE ANY PAIN AND AGAIN 4 HOURS LATER SO WHEN THE BLOCK WEARS OFF, YOU ARE NOT CAUGHT OFF GUARD  SMOKING:   Smoking results in incomplete healing of fractures (broken bones) and joints that my have been fused  Smoking and nicotine also prevents the growth of bone into ankle replacements and bone healing  It also slows the healing of muscles and skin (soft tissue)  Therefore, please do not have surgery if you continue to smoke  We reserve the right to cancel your surgery if we suspect that you are smoking  DO NOT use nicorette gum or other patches    Please find an alternative method to quit smoking before your surgery and do not restart after surgery to allow for healing  THREE RULES:    1  After surgery you will most likely be given the instructions KEEP YOUR TOES ABOVE YOUR NOSE    This means that you MUST have your feet elevated higher than your heart  Keeping your toes above your nose helps to heal the muscles and skin (soft tissues) by reducing swelling in your leg  This position also helps to prevent infection, and is very important in avoiding deep venous thrombosis (blood clots)  2  In order to keep the blood circulating in your legs and in order to avoid deep vein   thrombosis (blood clots), we ask patients to GET UP ONCE AN HOUR during the day  This means you should at least cross the room and come back  It does not mean you have to be up for long periods of time  In most cases we will not have people immediately put any weight on their operated part  This is important to prevent loosening of metal or other devices holding the bones together  It also prevents irritation of the soft tissues which can lead to prolonged healing  When we say get up once an hour, please walk, hop or move with an assisted device  This is important! 3  Do not do any excessive walking during the first few days after surgery  Recovering from surgery is a full-time task for the patient  Postoperative care is important to avoid irritating the skin incision, which can lead to infection  Please do not plan activities or go out of town for several weeks after surgery  AFTER YOUR SURGERY:   Bleeding through the bandage almost always occurs  Do not let this alarm you  Simply add more gauze or a towel, call us, and come in for a dressing change  If you think it is excessive, contact us immediately or go to the local emergency room   Do not get the bandage wet  Showering is possible with plastic protectors  Be very careful, as the bathroom can be wet and slippery    If you do get your dressing wet, it should be changed immediately  Please contact us   ONCE YOUR ARE OUT OF YOUR CAST AND/OR REMOVABLE BOOT, SWELLING MAY PERSIST FOR MANY MONTHS  THERE WILL ALSO BE A BLUISH DISCOLORATION OF YOUR LEG FOR MONTHS  THIS IS NORMAL AND PART OF THE USUAL POSTOPERATIVE EXPERIENCE  WEARING COMPRESSION HOSE (ELASTIC STOCKINGS) CAN HELP AVOID SOME OF THIS SWELLING   Ice the area 20 minutes every hour once the nerve block wears off  If you are in a cast or a splint, you may need to leave the ice on longer than 20 minutes in order to feel any benefits  DRESSING:   The purpose of the surgical dressing is to keep your wound and the surgical site protected from the environment  Most dressings contain splints, which help to hold your foot and ankle in a corrected position, and also allow the surgical site to heal properly  If you have a drain in place, this will need to be removed in 1 day after surgery  The time for the drain to be pulled will be written on your discharge instruction sheet  CAST  INSTRUCTIONS:  You may or may not get a cast following surgery  If you do, pay close attention to the following:     After application of a splint or cast, it is very important to elevate your leg for 24 to 72 hours  The injured area should be elevated well above the heart  Remember Toes above your Nose  Rest and elevation greatly reduce pain and speed the healing process by minimizing early swelling      CALL YOUR DOCTORS OFFICE OR VISIT LOCATION EMERGENCY ROOM IF YOU HAVE ANY OF THE FOLLOWING:     Significant increased pain, which may be caused by swelling (Strict elevation will alleviate this)   Numbness and tingling in your hand or foot, which may be caused by too much pressure on the nerves (There is always some numbness after surgery due to nerve blocks)   Burning and stinging, which may be caused by too much pressure on the skin   Excessive swelling below the cast, which may mean the cast is slowing your blood circulation   Loss of active movement of toes, which request an urgent evaluation   Loss of capillary refill  Pinch the tip of toes and teri the skin  Release pressure and if the skin does not return pink then call the office immediately  DO NOT GET YOUR CAST WET  Bacteria thrive in moist dark areas  We do not want this  If your cast becomes wet, return to the office and we will apply another one  PAIN AFTER SURGERY:  Narcotic pain medication can and will depress your respiratory system if taken in excess  The goal of pain management with narcotics is to be comfortable not pain free  If you take enough narcotics to be pain free then you run the risk of stopping breathing  If this happens, call 911 immediately!  Pain in the heel is often  caused by pressure from the weight of your foot on the bed  Make sure your heel is suspended off the bed by keeping a pillow underneath your calf not your knee  Medications: You will be given narcotic pain medication  Do NOT drive while taking narcotic medications  Medications such as Darvocet, Percocet, Vicoden or Tylenol #3, also contain acetaminophen (Tylenol)  Do not take acetaminophen or Tylenol from home when taking theses medications  When you fill your prescription, you may ask the pharmacist if your pain medication has acetaminophen/Tylenol in it  It is okay to take Tylenol with Oxycontin/Oxycodone  Unless you are allergic to aspirin or currently taking a blood thinner, Dr Jim Mendoza patients are requested to take one 325 mg aspirin every 12 hours until you are back to walking normally after surgery (This can be up to 6 weeks)  Ecotrin (Enteric-coated aspirin) is more sensitive to the stomach and we recommend purchasing this instead of regular aspirin to minimize the risk of stomach irritation  Narcotic medications commonly cause nausea  Taking them with food will decrease this side effect   If you are having extreme nausea, please contact us for an alternative medication or for something that can be taken with this medication to decrease the nausea  Also, narcotic medications frequently cause constipation  An increase of fiber, fruits and vegetables in your diet may alleviate this problem, or if necessary, you may use an over-the-counter medication such as senekot, colace, or Fibercon for constipation problems  You should resume all medications you were taking prior to the surgery unless otherwise specified  If you had fracture surgery, bony surgery like an osteotomy or fusion, or a surgery that requires bone healing, you are advised to take Vitamin D and Calcium to improve healing potential   Vitamin D3 4000 units/day and Calcium 1200mg/day  These are over the counter medications so please pick them up at the pharmacy when you are picking up your prescriptions  Activity:   Because of your recent foot surgery, your activity level will decrease  You will need to elevate your foot ABOVE the level of your heart for a minimum of four days  The length of time necessary for the swelling to go down, and for your wounds to heal properly depends greatly on your efforts here  Elevation is extremely important to avoid compromising the blood supply to your foot  Remember when your foot is down it will swell, which will increase pain and slow healing  Wiggle your toes frequently if possible  If you go home with a regional block, (a type of anesthesia) the foot and leg will be numb  Think of ways to get into your house and around the house until the block wears off  Keep in mind that it may be a legal issue if you drive while in a cast or splint, especially when the splint is on the right foot  You may call the Department of Motor Vehicles to schedule a road test if you have adaptive equipment applied to your car     The amount of weight you are allowed to bear on your foot will be written on your discharge sheet filled out at the time of surgery  The following is an explanation of the possibilities:          Heel-only weight bearing:   Usually, this order is given for use with a special shoe only, which will help you to put weight only on your heel  You may bear your body weight on your foot, as long as it is only borne on your heel

## 2021-04-21 NOTE — OP NOTE
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, subsequent encounter [S92 811K]     * Hallux interphalangeus, acquired, right [M20 11]    Procedure(s) (LRB):  BUNIONECTOMY CAILIN, And tibial sesamoid excision (Right)    Specimen(s):  ID Type Source Tests Collected by Time Destination   1 : Tibia sesamoid Tissue Soft Tissue, Other TISSUE EXAM Adella Hagedorn Lachman, MD 2021  1:12 PM        Estimated Blood Loss:   Minimal    Drains:  * No LDAs found *    Anesthesia Type:   Choice    Operative Indications:  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, subsequent encounter [S92 811K]  Hallux interphalangeus, acquired, right [M20 11]      Operative Findings:  Consistent with diagnosis    Complications:   None    Procedure and Technique:  1  Excision of tibial sesamoid  2  Proximal Phalanx medial closing wedge osteotomy (Akin) for bunion correction  3  Modified Vázquez soft tissue correction  4  Fluoroscopy without benefit of radiologist, <1 hour    On the day of surgery, the patient presented to the preoperative holding area where they identified the operative site, which was marked by Dr Maritza Rolon  The patient was transferred to the OR in stable condition  Following smooth induction of anesthesia, the patient was positioned supine with a bump under the ipsilateral hip  A non-sterile tourniquet was applied to the thigh and the leg was prepped and draped in standard sterile fashion      Preoperative antibiotics were administered by anesthesia and a standard preoperative timeout was performed  The leg was exsanguinated with a sterile esmarch and the tourniquet was inflated to 250 mm Hg  A medial incision was made over the first MTP joint  Careful dissection was carried down to the capsule and plantarly towards the tibial sesamoid  We opened the capsule and identified the sesamoid bone  Using a [place] blade, we sharply excised the sesamoid bone from the brevis tendon  We were able to remove the bone in 3 pieces as the bipartite sesamoid had split into 3 pieces  We then irrigated thoroughly and then closed the rent where the bone was  Attention was then turned to the Akin osteotomy  The base of the proximal phalanx was exposed and the sagittal saw was used to resect a medial wedge from the proximal phalanx, leaving the lateral cortex intact  The wedge was then closed and a medial staple was inserted for fixation  A capsular imbrication was then performed by resecting a V-shaped wedge from the inferior capsule  The capsule was reapproximated with 3-0 ethibond and 2-0 vicryl  Final fluoroscopy confirmed excellent deformity correction and good positioning of all hardware  Prior to closure, all wounds were thoroughly irrigated  4-0 vicryl sutures were used subcutaneously to reapproximate the skin, followed by interrupted 4-0 nylon sutures  A sterile dressing consisting of antimicrobial dressings followed by gauze was applied  The tourniquet was let down after the dressing was applied after establishing hemostasis  At the conclusion of the case, all needle, instrument, and lap counts were correct  Dr Boom James was present for all essential portions of the procedure       I was present for the entire procedure    Patient Disposition:  PACU     SIGNATURE: Luis E Kelly MD  DATE: April 21, 2021  TIME: 3:11 PM

## 2021-04-21 NOTE — INTERVAL H&P NOTE
H&P reviewed  After examining the patient I find no changes in the patients condition since the H&P had been written  There were no vitals filed for this visit  Plan for right foot akin bunion correction and tibial sesamoid excision

## 2021-04-21 NOTE — ANESTHESIA POSTPROCEDURE EVALUATION
Post-Op Assessment Note    CV Status:  Stable  Pain Score: 0    Pain management: adequate     Mental Status:  Alert and awake   Hydration Status:  Euvolemic   PONV Controlled:  Controlled   Airway Patency:  Patent      Post Op Vitals Reviewed: Yes      Staff: CRNA         No complications documented      BP   137/67   Temp   97 3   Pulse  71   Resp   19   SpO2   100

## 2021-04-22 ENCOUNTER — TELEPHONE (OUTPATIENT)
Dept: OBGYN CLINIC | Facility: CLINIC | Age: 46
End: 2021-04-22

## 2021-04-22 DIAGNOSIS — S92.811K CLOSED FRACTURE OF SESAMOID BONE OF RIGHT FOOT WITH NONUNION, SUBSEQUENT ENCOUNTER: Primary | ICD-10-CM

## 2021-04-22 NOTE — TELEPHONE ENCOUNTER
Dr Tiffany Miles [phone] had foot surgery right toe on 4/21/21 and needs the script and surgical notes as well as the type of knee scooter (all terrain version allows you to use it on sidewalks as well as indoors) added to script  Please fax to  [facility] [phone]  [phone]  Going through [facility] Joya [facility]     Any questions or once sent please call Kylie More   Thank you

## 2021-04-22 NOTE — TELEPHONE ENCOUNTER
[facility] clinical staff can you fax the scooter script and surgical notes to Zulay Connor  - Candida

## 2021-04-27 ENCOUNTER — OFFICE VISIT (OUTPATIENT)
Dept: PULMONOLOGY | Facility: CLINIC | Age: 46
End: 2021-04-27
Payer: COMMERCIAL

## 2021-04-27 VITALS
OXYGEN SATURATION: 100 % | HEIGHT: 65 IN | TEMPERATURE: 97.2 F | WEIGHT: 220 LBS | BODY MASS INDEX: 36.65 KG/M2 | DIASTOLIC BLOOD PRESSURE: 74 MMHG | HEART RATE: 91 BPM | SYSTOLIC BLOOD PRESSURE: 133 MMHG

## 2021-04-27 DIAGNOSIS — G47.33 OSA (OBSTRUCTIVE SLEEP APNEA): ICD-10-CM

## 2021-04-27 DIAGNOSIS — J45.30 MILD PERSISTENT ASTHMA WITHOUT COMPLICATION: Primary | ICD-10-CM

## 2021-04-27 DIAGNOSIS — E66.09 CLASS 2 OBESITY DUE TO EXCESS CALORIES WITHOUT SERIOUS COMORBIDITY WITH BODY MASS INDEX (BMI) OF 36.0 TO 36.9 IN ADULT: ICD-10-CM

## 2021-04-27 PROCEDURE — 99214 OFFICE O/P EST MOD 30 MIN: CPT | Performed by: INTERNAL MEDICINE

## 2021-04-27 NOTE — PROGRESS NOTES
Pulmonary Follow Up Note   Juana Chiang 42 y o  male MRN: [id number]  4/27/2021      Referring provider: Dr Holley Mesa and Plan:    Mild persistent asthma without complication  - significantly improved, controlled with current regimen  - cont  Flovent BID and rinse after use  - Cont  EMILY as needed  - Cont  Montelukast nightly  MANOJ (obstructive sleep apnea)  -  Auto PAP compliance report reviewed  Over past 30 days, device was used 90% of the time  Average use 5 hours 5 minutes  Average AHI 3 5  No significant leak  -   Continue excellent compliance  Class 2 obesity due to excess calories with body mass index (BMI) of 36 0 to 36 9 in adult  -    His weight is unchanged  He feels he cannot lose weight since he cannot currently exercise  Recommended continuing to monitor diet, limiting portions, limiting carbohydrates  Diagnoses and all orders for this visit:    Mild persistent asthma without complication    MANOJ (obstructive sleep apnea)    Class 2 obesity due to excess calories without serious comorbidity with body mass index (BMI) of 36 0 to 36 9 in adult      Plan of care discussed in detail with patient  Return for follow-up in 6 months  History of Present Illness   HPI:  Juana Chiang is a 42 y o  male who presents for follow up of his cough and MANOJ  He had surgery on his right foot last week  His cough is better  His SOB is improved  He does not have it with conversation or movement as he did before  He is using Flovent twice daily and montelukast nightly  He finds them helpful  Does not use his Albuterol inhaler  He is wearing CPAP with sleep  He finds the mask uncomfortable  He is using it nightly  He reports that his fiance tells him he no longer snores  He wears it all night  He is wearing it for at least four hours  He does not feel any different when he wakes up  He has daytime somnolence that he attributes to not getting enough sleep       Weight is unchanged  Not able to lose weight by exercise  He is monitoring his diet  Currently out of work for fourth months  Review of Systems  Positive as mentioned above and negative otherwise  Historical Information   Past Medical History:   Diagnosis Date    Asthma     CPAP (continuous positive airway pressure) dependence     Hypertension     Nasal disorder     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 patient     Sleep apnea      Past Surgical History:   Procedure Laterality Date    HERNIA REPAIR      MN CORRJ HALLUX VALGUS W/SESMDC W/PROX PHLNX OSTEOT Right 4/21/2021    Procedure: Jyl Golds, And tibial sesamoid excision;  Surgeon: Jailene Mendoza MD;  Location: [facility];  Service: Orthopedics     No family history on file        Meds/Allergies     Current Outpatient Medications:     albuterol (Ventolin HFA) 90 mcg/act inhaler, Inhale 2 puffs every 6 (six) hours as needed for wheezing, Disp: 18 g, Rfl: 3    aspirin (ECOTRIN) 325 mg EC tablet, Take 1 tablet (325 mg total) by mouth 2 (two) times a day, Disp: 90 tablet, Rfl: 0    atorvastatin (LIPITOR) 40 mg tablet, Take 1 tablet (40 mg total) by mouth daily, Disp: 90 tablet, Rfl: 3    fluticasone (FLOVENT HFA) 110 MCG/ACT inhaler, Inhale 2 puffs 2 (two) times a day Rinse mouth after use , Disp: 1 Inhaler, Rfl: 3    losartan (COZAAR) 50 mg tablet, Take 1 tablet (50 mg total) by mouth daily, Disp: 90 tablet, Rfl: 3    metoprolol succinate (TOPROL-XL) 50 mg 24 hr tablet, Take 1 tablet (50 mg total) by mouth daily, Disp: 90 tablet, Rfl: 3    montelukast (SINGULAIR) 10 mg tablet, Take 1 tablet (10 mg total) by mouth daily at bedtime, Disp: 30 tablet, Rfl: 3    ondansetron (ZOFRAN-ODT) 4 mg disintegrating tablet, Take 1 tablet (4 mg total) by mouth every 6 (six) hours as needed for nausea or vomiting, Disp: 20 tablet, Rfl: 0    oxyCODONE (ROXICODONE) 5 mg immediate release tablet, Take 1 tablet (5 mg total) by mouth every 4 (four) hours as needed for moderate pain for up to 10 daysMax Daily Amount: 30 mg, Disp: 30 tablet, Rfl: 0  No Known Allergies    Vitals: Blood pressure 133/74, pulse 91, temperature (!) 97 2 °F (36 2 °C), height 5' 5" (1 651 m), weight 99 8 kg (220 lb), SpO2 100 %  Body mass index is 36 61 kg/m²  Oxygen Therapy  SpO2: 100 %      Physical Exam   GEN: WDWN, nad, comfortable  HEENT: NCAT, EOMI  CVS: Regular, no m/r/g  LUNGS: CTA b/l, no w/r/r  ABD: soft  EXT: No c/c/e  NEURO: No focal deficits  MS: Moving all extremities  SKIN: warm, dry  PSYCH: calm, cooperative      Labs: I have personally reviewed pertinent lab results  Lab Results   Component Value Date    WBC 7 81 12/09/2019    HGB 14 2 12/09/2019    HCT 42 1 12/09/2019    MCV 90 12/09/2019     12/09/2019     Lab Results   Component Value Date    GLUCOSE 91 12/15/2015    CALCIUM 8 9 11/23/2019     12/15/2015    K 4 9 11/23/2019    CO2 29 11/23/2019     11/23/2019    BUN 21 11/23/2019    CREATININE 1 15 11/23/2019     No results found for: IGE  Lab Results   Component Value Date    ALT 24 11/23/2019    AST 18 11/23/2019    ALKPHOS 80 11/23/2019    BILITOT 0 50 12/15/2015     Labs per my interpretation show normal creatinine, normal hemoglobin    Pulmonary function testing:   Spirometry November 2020 per my review shows normal spirometry    EKG, Pathology, and Other Studies: I have personally reviewed pertinent reports  CPAP compliance report per my review shows 90% usage, average use 5 hours 5 minutes, average AHI 3 5, time with large leak is 0 seconds  [facility]

## 2021-04-27 NOTE — Clinical Note
Can you call this pt and tell him his autoPAP compliance report was good  He has no large mask leak, his MANOJ is controlled

## 2021-04-28 NOTE — ASSESSMENT & PLAN NOTE
-  Auto PAP compliance report reviewed  Over past 30 days, device was used 90% of the time  Average use 5 hours 5 minutes  Average AHI 3 5  No significant leak  -   Continue excellent compliance

## 2021-04-28 NOTE — ASSESSMENT & PLAN NOTE
-    His weight is unchanged  He feels he cannot lose weight since he cannot currently exercise  Recommended continuing to monitor diet, limiting portions, limiting carbohydrates

## 2021-04-28 NOTE — ASSESSMENT & PLAN NOTE
- significantly improved, controlled with current regimen  - cont  Flovent BID and rinse after use  - Cont  EMILY as needed  - Cont  Montelukast nightly

## 2021-05-11 ENCOUNTER — OFFICE VISIT (OUTPATIENT)
Dept: OBGYN CLINIC | Facility: CLINIC | Age: 46
End: 2021-05-11

## 2021-05-11 VITALS — BODY MASS INDEX: 36.65 KG/M2 | WEIGHT: 220 LBS | HEIGHT: 65 IN

## 2021-05-11 DIAGNOSIS — S92.811K CLOSED FRACTURE OF SESAMOID BONE OF RIGHT FOOT WITH NONUNION, SUBSEQUENT ENCOUNTER: Primary | ICD-10-CM

## 2021-05-11 DIAGNOSIS — M20.11 HALLUX INTERPHALANGEUS, ACQUIRED, RIGHT: ICD-10-CM

## 2021-05-11 PROCEDURE — 99024 POSTOP FOLLOW-UP VISIT: CPT | Performed by: ORTHOPAEDIC SURGERY

## 2021-05-11 NOTE — PATIENT INSTRUCTIONS
Continue to elevate as much as possible  Continue to stay off of your foot as much as you can  Ice for 20 minutes every hour if swollen  When weightbearing, you must wear the heel weightbearing boot  If you still have pain, continue to wear this to sleep at night  Once comfortable enough to do so, you may remove the boot while sleeping  You can remove this bunion dressing and take a shower in the next day or so  Do not soak the foot in a pool/tub/ocean for another 4 weeks  After your shower, begin scar massage  This means take a pea sized amount of lotion (non-medicated) and massage the scar in a circular pattern for 5 minutes  PURCHASE A SILICONE GEL TOE SPACER (Type this into [employer]  com and pick the item that looks like the item below)        You will then perform bunion strapping with the materials provided  You have 5 dressing changes that should last you 3 weeks  So you should perform these dressing changes every 3rd day (ie  Cut off the dressing, take a shower, perform scar massage, and perform bunion strapping every 3rd day)  Continue Aspirin, Continue Vitamin D and Calcium  Continue to avoid NSAIDs like Motrin or Aleve  Begin ROM exercises as described below; Rehabilitation Exercises -(5 times / day)   Big Toe Joint Exercises : Aim - To restore big toe joint movements   1  Seated heel raises x 20: Sit with foot flat on floor, knees bent 90? keeping toes on floor, raise heel to the limit of pain and return  Bend your knee more than 90? if you feel comfortable to do so, this will bend the toe more  2  Toe lifts x 20: Sit with foot flat on floor, raise toe as far as possible to ceiling and return   3  Toe bends x 20: Sit with toes resting over the edge of a phone book, bend toes towards the floor   4  Toe pulls x 20: Pull toe up with hand to pain and hold for 3 seconds, relax   5   Toe pushes x 20: Push toe down with hand to pain and hold for 3 seconds, relax         Return to clinic in 3 weeks for Xrays and we will then transition you out of the boot and into a sneaker by 8 weeks postoperatively

## 2021-05-11 NOTE — PROGRESS NOTES
BREN Hernandez  Attending, Orthopaedic Surgery  Foot and Ankle  [facility]      ORTHOPAEDIC FOOT AND ANKLE POST-OP VISIT     Procedure:     Right tibial sesamoid excision and Akin bunion correction       Date of surgery:   4/21/21      PLAN  1  Weightbearing Status- WBAT operative extremity in Darco boot  2  DVT prophylaxis-  BID  3  Continue to elevate 23hrs/day getting up 1x per hour to prevent a blood clot  4  Pain control- OTC pain medication  5  RTC in 3 week(s)  6  Xrays needed next visit - yes Weightbearing Foot    History of Present Illness:   Chief Complaint: s/p above procedure  Mitch Almeida is a 42 y o  male who is being seen for post-operative visit for the above procedure  Pain is well controlled and the patient has successfully transitioned to OTC pain medicines  he is taking ASA 325mg BID for DVT prophylaxis  Patient has been WBAT in a Darco Heel walker  Review of Systems:  General- denies fever/chills  Respiratory- denies cough or SOB  Cardio- denies chest pain or palpitations  GI- denies abdominal pain  Musculoskeletal- Negative except noted above  Skin- denies rashes or wounds    Physical Exam:   There were no vitals taken for this visit  General/Constitutional: No apparent distress: well-nourished and well developed  Eyes: normal ocular motion  Lymphatic: No appreciable lymphadenopathy  Respiratory: Non-labored breathing  Vascular: No edema, swelling or tenderness, except as noted in detailed exam   Integumentary: No impressive skin lesions present, except as noted in detailed exam   Neuro: No ataxia or tremors noted  Psych: Normal mood and affect, oriented to person, place and time  Appropriate affect  Musculoskeletal: Normal, except as noted in detailed exam and in HPI      Examination    right        Incision Clean, dry, intact  Sutures Removed this visit    Ecchymosis none    Swelling Mild    Sensation Intact to light touch throughout sural, saphenous, superficial peroneal, deep peroneal and medial/lateral plantar nerve distributions  [place] 5 07 filament (10g) testing deferred  Cardiovascular Brisk capillary refill < 2 seconds,intact DP and PT pulses    Special Tests None      Imaging Studies:   No New imaging        James R Lachman, MD  Foot & Ankle Surgery   Department of [address] [place] [address]      I personally performed the service  Mona Smolder Lachman, MD

## 2021-06-01 ENCOUNTER — OFFICE VISIT (OUTPATIENT)
Dept: OBGYN CLINIC | Facility: CLINIC | Age: 46
End: 2021-06-01

## 2021-06-01 ENCOUNTER — APPOINTMENT (OUTPATIENT)
Dept: LAB | Age: 46
End: 2021-06-01

## 2021-06-01 ENCOUNTER — TRANSCRIBE ORDERS (OUTPATIENT)
Dept: ADMINISTRATIVE | Age: 46
End: 2021-06-01

## 2021-06-01 ENCOUNTER — APPOINTMENT (OUTPATIENT)
Dept: RADIOLOGY | Facility: AMBULARY SURGERY CENTER | Age: 46
End: 2021-06-01
Attending: ORTHOPAEDIC SURGERY
Payer: COMMERCIAL

## 2021-06-01 VITALS
HEART RATE: 84 BPM | DIASTOLIC BLOOD PRESSURE: 94 MMHG | HEIGHT: 65 IN | WEIGHT: 220 LBS | SYSTOLIC BLOOD PRESSURE: 146 MMHG | BODY MASS INDEX: 36.65 KG/M2

## 2021-06-01 DIAGNOSIS — Z00.8 HEALTH EXAMINATION IN POPULATION SURVEY: ICD-10-CM

## 2021-06-01 DIAGNOSIS — Z00.8 HEALTH EXAMINATION IN POPULATION SURVEY: Primary | ICD-10-CM

## 2021-06-01 DIAGNOSIS — S92.811K CLOSED FRACTURE OF SESAMOID BONE OF RIGHT FOOT WITH NONUNION, SUBSEQUENT ENCOUNTER: ICD-10-CM

## 2021-06-01 DIAGNOSIS — S92.811K CLOSED FRACTURE OF SESAMOID BONE OF RIGHT FOOT WITH NONUNION, SUBSEQUENT ENCOUNTER: Primary | ICD-10-CM

## 2021-06-01 LAB
CHOLEST SERPL-MCNC: 179 MG/DL (ref 50–200)
EST. AVERAGE GLUCOSE BLD GHB EST-MCNC: 117 MG/DL
HBA1C MFR BLD: 5.7 %
HDLC SERPL-MCNC: 29 MG/DL
NONHDLC SERPL-MCNC: 150 MG/DL
TRIGL SERPL-MCNC: 416 MG/DL

## 2021-06-01 PROCEDURE — 99024 POSTOP FOLLOW-UP VISIT: CPT | Performed by: ORTHOPAEDIC SURGERY

## 2021-06-01 PROCEDURE — 36415 COLL VENOUS BLD VENIPUNCTURE: CPT

## 2021-06-01 PROCEDURE — 80061 LIPID PANEL: CPT

## 2021-06-01 PROCEDURE — 73630 X-RAY EXAM OF FOOT: CPT

## 2021-06-01 PROCEDURE — 83036 HEMOGLOBIN GLYCOSYLATED A1C: CPT

## 2021-06-01 NOTE — PROGRESS NOTES
BREN Lawson  Attending, Orthopaedic Surgery  Foot and Ankle  [facility]      ORTHOPAEDIC FOOT AND ANKLE POST-OP VISIT     Procedure:     Right tibial sesamoid excision and Akin bunion correction       Date of surgery:   4/21/21      PLAN  1  Weightbearing Status- WBAT operative extremity  2  DVT prophylaxis- ASA 325mg BID  3  Continue to elevate 23hrs/day getting up 1x per hour to prevent a blood clot  4  Pain control- OTC pain medication  5  RTC in 6 weeks  6  Xrays needed next visit - yes Weightbearing Foot    History of Present Illness:   Chief Complaint: right foot post op     Yesi Martínez is a 42 y o  male who is being seen for post-operative visit for the above procedure  Pain is well controlled and the patient has successfully transitioned to OTC pain medicines  he is taking ASA 325mg BID for DVT prophylaxis  Patient has been WBAT in a Darco Heel walker  Review of Systems:  General- denies fever/chills  Respiratory- denies cough or SOB  Cardio- denies chest pain or palpitations  GI- denies abdominal pain  Musculoskeletal- Negative except noted above  Skin- denies rashes or wounds    Physical Exam:   There were no vitals taken for this visit  General/Constitutional: No apparent distress: well-nourished and well developed  Eyes: normal ocular motion  Lymphatic: No appreciable lymphadenopathy  Respiratory: Non-labored breathing  Vascular: No edema, swelling or tenderness, except as noted in detailed exam   Integumentary: No impressive skin lesions present, except as noted in detailed exam   Neuro: No ataxia or tremors noted  Psych: Normal mood and affect, oriented to person, place and time  Appropriate affect  Musculoskeletal: Normal, except as noted in detailed exam and in HPI  Examination    right        Incision Clean, dry, intact  Sutures Previously removed      Ecchymosis none    Swelling Mild    Sensation Intact to light touch throughout sural, saphenous, superficial peroneal, deep peroneal and medial/lateral plantar nerve distributions  [place] 5 07 filament (10g) testing deferred  Cardiovascular Brisk capillary refill < 2 seconds,intact DP and PT pulses    Special Tests None      Imaging Studies:   3 views of the right foot were taken, reviewed and interpreted independently that demonstrate interval healing of bunion correction  Reviewed by me personally  Dory Cross Lachman, MD  Foot & Ankle Surgery   Department of [address] [place] [address]      I personally performed the service  Dory Cross Lachman, MD    Scribe Attestation    I,:  Kentrell Fuentes MA am acting as a scribe while in the presence of the attending physician :       I,:  Sera Hutchins MD personally performed the services described in this documentation    as scribed in my presence :

## 2021-06-01 NOTE — PATIENT INSTRUCTIONS
You may begin weaning your boot and transitioning to a sneaker (6/1)  It is important to do this gradually to avoid aggravating the healing process  1  6/1, you may come out of the boot into a sneaker for 1 hours  2  6/2, you may come out of the boot into a sneaker for 2 hours,  3  The next day, you may come out of the boot into a sneaker for 3 hours  4  Continue this (adding 1 hours per day) as you tolerate  For example, if you do 6 hours out of the boot into a sneaker and your foot swells more than usual at night and it is difficult to control the discomfort, do not advance to 7 hours the next day, stay at 6 hours until you are able to tolerate it  Elevation, Ice and tylenol and staying off of it at night will be important to aide in this transition out of the boot  Swelling and soreness are normal as you begin to do more with the injured leg  May DC aspirin/lovenox, no longer needed  May shower, do not soak in a tub/pool/ocean/etc for another 1 weeks  Continue home exercises  Scar massage- pea sized amount of lotion, massage into scar for 5 minutes each day  Compression stocking (Knee high, 20-30mm Hg) to be worn at all times while awake  Recommend taking the following supplements: Vitamin D 4000 units per day and Calcium 1200 mg per day  This will help with bone healing  Rehabilitation Exercises   Big Toe Joint Exercises : Aim - To restore big toe joint movements   1  Seated heel raises x 20: Sit with foot flat on floor, knees bent 90? keeping toes on floor, raise heel to the limit of pain and return  Bend your knee more than 90? if you feel comfortable to do so, this will bend the toe more  2  Toe lifts x 20: Sit with foot flat on floor, raise toe as far as possible to ceiling and return   3  Toe bends x 20: Sit with toes resting over the edge of a phone book, bend toes towards the floor   4  Toe pulls x 20: Pull toe up with hand to pain and hold for 3 seconds, relax   5   Toe pushes x 20: Push toe down with hand to pain and hold for 3 seconds, relax     Rehabilitation Exercises   6  Standing both heel raises x 20: Stand close to a wall for balance, raise heels to the limit of toe pain and hold for 3 seconds   7  Standing single heel raises x 20: Stand close to a wall for balance, stand on one leg, raise heel to the limit of toe pain and hold for 3 seconds   8   Continue exercise 3, 4 and 5   Ankle and Calf Exercises : Aim - To maintain muscle tone, strength and mobility   Ankle and Calf: ankle plantar flexion (tip toe position) and dorsiflexion (bring foot up), strengthening of the peroneal muscles (bring foot out to the side)   Gait training: Optimise load distribution for the whole foot focusing on weight bearing of the first MTP (big toe) joint and hallux (big toe)

## 2021-07-13 ENCOUNTER — APPOINTMENT (OUTPATIENT)
Dept: RADIOLOGY | Facility: AMBULARY SURGERY CENTER | Age: 46
End: 2021-07-13
Attending: ORTHOPAEDIC SURGERY
Payer: COMMERCIAL

## 2021-07-13 ENCOUNTER — OFFICE VISIT (OUTPATIENT)
Dept: OBGYN CLINIC | Facility: CLINIC | Age: 46
End: 2021-07-13

## 2021-07-13 VITALS
DIASTOLIC BLOOD PRESSURE: 102 MMHG | WEIGHT: 220 LBS | BODY MASS INDEX: 36.65 KG/M2 | SYSTOLIC BLOOD PRESSURE: 165 MMHG | HEART RATE: 71 BPM | HEIGHT: 65 IN

## 2021-07-13 DIAGNOSIS — S92.811K CLOSED FRACTURE OF SESAMOID BONE OF RIGHT FOOT WITH NONUNION, SUBSEQUENT ENCOUNTER: Primary | ICD-10-CM

## 2021-07-13 DIAGNOSIS — S92.811K CLOSED FRACTURE OF SESAMOID BONE OF RIGHT FOOT WITH NONUNION, SUBSEQUENT ENCOUNTER: ICD-10-CM

## 2021-07-13 PROCEDURE — 73630 X-RAY EXAM OF FOOT: CPT

## 2021-07-13 PROCEDURE — 99024 POSTOP FOLLOW-UP VISIT: CPT | Performed by: ORTHOPAEDIC SURGERY

## 2021-07-13 NOTE — PROGRESS NOTES
BREN Booth  Attending, Orthopaedic Surgery  Foot and Ankle  [facility]      ORTHOPAEDIC FOOT AND ANKLE POST-OP VISIT     Procedure:     Right tibial sesamoid excision and Akin bunion correction       Date of surgery:   4/21/21      PLAN  1  Weightbearing Status- WBAT operative extremity  2  DVT prophylaxis- completed  3  Continue to elevate 23hrs/day getting up 1x per hour to prevent a blood clot  4  Pain control- OTC pain medication  5  RTC in 3 months  6  Xrays needed next visit - yes Weightbearing Foot    History of Present Illness:   Chief Complaint: right foot post op     Cristal Holstein is a 42 y o  male who is being seen for post-operative visit for the above procedure  Pain is overall controlled and the patient has successfully transitioned to OTC pain medicines, however, he continues to have discomfort with ambulation when rolling his foot onto the great toe  The patient has been compliant with his home exercises and he obtained a new pair of shoes that he believed were more supportive than his previous pair  he has completed ASA 325mg BID for DVT prophylaxis  Patient has been WBAT in a normal shoe  Review of Systems:  General- denies fever/chills  Respiratory- denies cough or SOB  Cardio- denies chest pain or palpitations  GI- denies abdominal pain  Musculoskeletal- Negative except noted above  Skin- denies rashes or wounds    Physical Exam:   BP (!) 165/102   Pulse 71   Ht 5' 5" (1 651 m)   Wt 99 8 kg (220 lb)   BMI 36 61 kg/m²   General/Constitutional: No apparent distress: well-nourished and well developed    Eyes: normal ocular motion  Lymphatic: No appreciable lymphadenopathy  Respiratory: Non-labored breathing  Vascular: No edema, swelling or tenderness, except as noted in detailed exam   Integumentary: No impressive skin lesions present, except as noted in detailed exam   Neuro: No ataxia or tremors noted  Psych: Normal mood and affect, oriented to person, place and time  Appropriate affect  Musculoskeletal: Normal, except as noted in detailed exam and in HPI  Examination    right        Incision Clean, dry, intact  Sutures Previously removed  Ecchymosis none    Swelling Mild    Sensation Intact to light touch throughout sural, saphenous, superficial peroneal, deep peroneal and medial/lateral plantar nerve distributions  [place] 5 07 filament (10g) testing deferred  Cardiovascular Brisk capillary refill < 2 seconds,intact DP and PT pulses    Special Tests None      Imaging Studies:   3 views of the right foot were taken, reviewed and interpreted independently that demonstrate interval healing of bunion correction  Reviewed by me personally  Sallyanne Papa Lachman, MD  Foot & Ankle Surgery   Department of [address] [place] [address]      I personally performed the service  Sallyanne Papa Lachman, MD

## 2021-07-13 NOTE — LETTER
July 13, 2021     Patient: Steve Jeong   YOB: 1975   Date of Visit: 7/13/2021       To Whom it May Concern:    Renee Parker is under my professional care  He was seen in my office on 7/13/2021  He may return to work on 7/26 with sedentary/desk duties only, if unable to accomodate this patient is able to return to work without restrictions on 8/28  If you have any questions or concerns, please don't hesitate to call           Sincerely,          Morgan Patton MD        CC: No Recipients

## 2021-09-10 ENCOUNTER — TELEPHONE (OUTPATIENT)
Dept: PULMONOLOGY | Facility: CLINIC | Age: 46
End: 2021-09-10

## 2021-09-10 NOTE — TELEPHONE ENCOUNTER
Pt had appt scheduled in a transplant spot where it should not have been made  Appt was canceled and I called pt and LM for him to call to reschedule with Dr Ioana Adkins in October

## 2021-09-16 ENCOUNTER — TELEPHONE (OUTPATIENT)
Dept: PULMONOLOGY | Facility: CLINIC | Age: 46
End: 2021-09-16

## 2021-09-16 NOTE — TELEPHONE ENCOUNTER
Called pt to reschedule his 10/28 appointment with Dr Christiano Grover for him to call back and reschedule for another October day

## 2021-09-25 ENCOUNTER — IMMUNIZATIONS (OUTPATIENT)
Dept: FAMILY MEDICINE CLINIC | Facility: HOSPITAL | Age: 46
End: 2021-09-25

## 2021-09-25 DIAGNOSIS — Z23 ENCOUNTER FOR IMMUNIZATION: Primary | ICD-10-CM

## 2021-09-25 PROCEDURE — 0001A SARS-COV-2 / COVID-19 MRNA VACCINE (PFIZER-BIONTECH) 30 MCG: CPT

## 2021-09-25 PROCEDURE — 91300 SARS-COV-2 / COVID-19 MRNA VACCINE (PFIZER-BIONTECH) 30 MCG: CPT

## 2022-01-04 ENCOUNTER — OFFICE VISIT (OUTPATIENT)
Dept: URGENT CARE | Facility: CLINIC | Age: 47
End: 2022-01-04

## 2022-01-04 VITALS — RESPIRATION RATE: 18 BRPM | HEART RATE: 95 BPM | OXYGEN SATURATION: 95 % | TEMPERATURE: 99 F

## 2022-01-04 DIAGNOSIS — B34.9 VIRAL SYNDROME: Primary | ICD-10-CM

## 2022-01-04 DIAGNOSIS — B02.9 HERPES ZOSTER WITHOUT COMPLICATION: ICD-10-CM

## 2022-01-04 PROCEDURE — 99213 OFFICE O/P EST LOW 20 MIN: CPT

## 2022-01-04 PROCEDURE — 87636 SARSCOV2 & INF A&B AMP PRB: CPT

## 2022-01-04 RX ORDER — VALACYCLOVIR HYDROCHLORIDE 1 G/1
1000 TABLET, FILM COATED ORAL 3 TIMES DAILY
Qty: 21 TABLET | Refills: 0 | Status: SHIPPED | OUTPATIENT
Start: 2022-01-04 | End: 2022-01-11

## 2022-01-04 NOTE — PROGRESS NOTES
[facility]        NAME: Melonie Galeazzi is a 42 y o  male  : 1975    MRN: [id number]  DATE: 2022  TIME: 4:15 PM    Pulse 95   Temp 99 °F (37 2 °C)   Resp 18   SpO2 95%     Assessment and Plan   Viral syndrome [B34 9]  1  Viral syndrome  valACYclovir (VALTREX) 1,000 mg tablet   2  Herpes zoster without complication  valACYclovir (VALTREX) 1,000 mg tablet         Patient Instructions       Follow up with PCP in 3-5 days  Proceed to  ER if symptoms worsen  Chief Complaint   No chief complaint on file  History of Present Illness       Pt with left scalp forehead rash,fatigue and muscle aches , pt is covid and flu vaccinated     Rash  The current episode started in the past 7 days  The problem has been gradually worsening since onset  The affected locations include the head  The rash is characterized by blistering, burning and itchiness  He was exposed to an ill contact  Associated symptoms include coughing, fatigue and joint pain  Pertinent negatives include no anorexia, congestion, diarrhea, eye pain, facial edema, fever, nail changes, rhinorrhea, shortness of breath, sore throat or vomiting  Review of Systems   Review of Systems   Constitutional: Positive for fatigue  Negative for fever  HENT: Negative  Negative for congestion, rhinorrhea and sore throat  Eyes: Negative  Negative for pain  Respiratory: Positive for cough  Negative for shortness of breath  Cardiovascular: Negative  Gastrointestinal: Negative  Negative for anorexia, diarrhea and vomiting  Endocrine: Negative  Genitourinary: Negative  Musculoskeletal: Positive for joint pain  Skin: Positive for rash  Negative for nail changes  Allergic/Immunologic: Negative  Neurological: Negative  Hematological: Negative  Psychiatric/Behavioral: Negative  All other systems reviewed and are negative          Current Medications       Current Outpatient Medications:     albuterol (Ventolin HFA) 90 mcg/act inhaler, Inhale 2 puffs every 6 (six) hours as needed for wheezing, Disp: 18 g, Rfl: 3    aspirin (ECOTRIN) 325 mg EC tablet, Take 1 tablet (325 mg total) by mouth 2 (two) times a day, Disp: 90 tablet, Rfl: 0    atorvastatin (LIPITOR) 40 mg tablet, Take 1 tablet (40 mg total) by mouth daily, Disp: 90 tablet, Rfl: 3    fluticasone (FLOVENT HFA) 110 MCG/ACT inhaler, Inhale 2 puffs 2 (two) times a day Rinse mouth after use , Disp: 1 Inhaler, Rfl: 3    losartan (COZAAR) 50 mg tablet, Take 1 tablet (50 mg total) by mouth daily, Disp: 90 tablet, Rfl: 3    metoprolol succinate (TOPROL-XL) 50 mg 24 hr tablet, Take 1 tablet (50 mg total) by mouth daily, Disp: 90 tablet, Rfl: 3    montelukast (SINGULAIR) 10 mg tablet, Take 1 tablet (10 mg total) by mouth daily at bedtime, Disp: 30 tablet, Rfl: 3    ondansetron (ZOFRAN-ODT) 4 mg disintegrating tablet, Take 1 tablet (4 mg total) by mouth every 6 (six) hours as needed for nausea or vomiting, Disp: 20 tablet, Rfl: 0    valACYclovir (VALTREX) 1,000 mg tablet, Take 1 tablet (1,000 mg total) by mouth 3 (three) times a day for 7 days, Disp: 21 tablet, Rfl: 0    Current Allergies     Allergies as of 01/04/2022    (No Known Allergies)            The following portions of the patient's history were reviewed and updated as appropriate: allergies, current medications, past family history, past medical history, past social history, past surgical history and problem list      Past Medical History:   Diagnosis Date    Asthma     CPAP (continuous positive airway pressure) dependence     Hypertension     Nasal disorder     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 patient     Sleep apnea        Past Surgical History:   Procedure Laterality Date    HERNIA REPAIR      SD CORRJ HALLUX VALGUS W/SESMDC W/PROX PHLNX OSTEOT Right 4/21/2021    Procedure: Bailey Hurst, And tibial sesamoid excision;  Surgeon: Vinod Mcgee MD;  Location: [facility];  Service: Orthopedics       History reviewed  No pertinent family history  Medications have been verified  Objective   Pulse 95   Temp 99 °F (37 2 °C)   Resp 18   SpO2 95%        Physical Exam     Physical Exam  Vitals and nursing note reviewed  Constitutional:       Appearance: Normal appearance  He is normal weight  HENT:      Head: Normocephalic and atraumatic  Right Ear: Tympanic membrane, ear canal and external ear normal       Left Ear: Tympanic membrane, ear canal and external ear normal       Nose: Nose normal       Mouth/Throat:      Mouth: Mucous membranes are moist       Pharynx: Oropharynx is clear  Eyes:      Conjunctiva/sclera: Conjunctivae normal       Pupils: Pupils are equal, round, and reactive to light  Cardiovascular:      Rate and Rhythm: Normal rate and regular rhythm  Pulses: Normal pulses  Pulmonary:      Effort: Pulmonary effort is normal       Breath sounds: Normal breath sounds  Abdominal:      General: Abdomen is flat  Bowel sounds are normal       Palpations: Abdomen is soft  Musculoskeletal:         General: Normal range of motion  Cervical back: Normal range of motion and neck supple  Skin:     General: Skin is warm  Capillary Refill: Capillary refill takes less than 2 seconds  Comments: Left forehead shingles    Neurological:      General: No focal deficit present  Mental Status: He is alert and oriented to person, place, and time     Psychiatric:         Mood and Affect: Mood normal          Behavior: Behavior normal

## 2022-01-07 LAB
FLUAV RNA RESP QL NAA+PROBE: NEGATIVE
FLUBV RNA RESP QL NAA+PROBE: NEGATIVE
SARS-COV-2 RNA RESP QL NAA+PROBE: NEGATIVE

## 2022-01-19 NOTE — ADDENDUM NOTE
Addended by: Kiah Persaud on: 1/15/2021 11:31 AM     Modules accepted: Orders Patient/Caregiver provided printed discharge information.

## 2022-03-18 ENCOUNTER — OFFICE VISIT (OUTPATIENT)
Dept: URGENT CARE | Age: 47
End: 2022-03-18
Payer: COMMERCIAL

## 2022-03-18 ENCOUNTER — APPOINTMENT (OUTPATIENT)
Dept: RADIOLOGY | Age: 47
End: 2022-03-18
Payer: COMMERCIAL

## 2022-03-18 VITALS
RESPIRATION RATE: 18 BRPM | OXYGEN SATURATION: 98 % | HEIGHT: 67 IN | TEMPERATURE: 97.9 F | HEART RATE: 89 BPM | WEIGHT: 220 LBS | BODY MASS INDEX: 34.53 KG/M2

## 2022-03-18 DIAGNOSIS — J40 BRONCHITIS: ICD-10-CM

## 2022-03-18 DIAGNOSIS — R05.9 COUGH: ICD-10-CM

## 2022-03-18 DIAGNOSIS — J32.9 SINUSITIS, UNSPECIFIED CHRONICITY, UNSPECIFIED LOCATION: Primary | ICD-10-CM

## 2022-03-18 DIAGNOSIS — Z20.822 ENCOUNTER FOR LABORATORY TESTING FOR COVID-19 VIRUS: ICD-10-CM

## 2022-03-18 PROCEDURE — 87636 SARSCOV2 & INF A&B AMP PRB: CPT | Performed by: PHYSICIAN ASSISTANT

## 2022-03-18 PROCEDURE — G0382 LEV 3 HOSP TYPE B ED VISIT: HCPCS | Performed by: PHYSICIAN ASSISTANT

## 2022-03-18 PROCEDURE — 71046 X-RAY EXAM CHEST 2 VIEWS: CPT

## 2022-03-18 PROCEDURE — S9083 URGENT CARE CENTER GLOBAL: HCPCS | Performed by: PHYSICIAN ASSISTANT

## 2022-03-18 RX ORDER — AZITHROMYCIN 250 MG/1
TABLET, FILM COATED ORAL
Qty: 6 TABLET | Refills: 0 | Status: SHIPPED | OUTPATIENT
Start: 2022-03-18 | End: 2022-03-22

## 2022-03-18 RX ORDER — ALBUTEROL SULFATE 90 UG/1
2 AEROSOL, METERED RESPIRATORY (INHALATION) EVERY 6 HOURS PRN
Qty: 8.5 G | Refills: 0 | Status: SHIPPED | OUTPATIENT
Start: 2022-03-18

## 2022-03-18 RX ORDER — BENZONATATE 100 MG/1
100 CAPSULE ORAL 3 TIMES DAILY PRN
Qty: 20 CAPSULE | Refills: 0 | Status: SHIPPED | OUTPATIENT
Start: 2022-03-18

## 2022-03-18 NOTE — PATIENT INSTRUCTIONS
[address]    Your healthcare provider and/or public health staff have evaluated you and have determined that you do not need to remain in the hospital at this time  At this time you can be isolated at home where you will be monitored by staff from your local or state health department  You should carefully follow the prevention and isolation steps below until a healthcare provider or local or state health department says that you can return to your normal activities  Stay home except to get medical care    People who are mildly ill with COVID-19 are able to isolate at home during their illness  You should restrict activities outside your home, except for getting medical care  Do not go to work, school, or public areas  Avoid using public transportation, ride-sharing, or taxis  Separate yourself from other people and animals in your home    People: As much as possible, you should stay in a specific room and away from other people in your home  Also, you should use a separate bathroom, if available  Animals: You should restrict contact with pets and other animals while you are sick with COVID-19, just like you would around other people  Although there have not been reports of pets or other animals becoming sick with COVID-19, it is still recommended that people sick with COVID-19 limit contact with animals until more information is known about the virus  When possible, have another member of your household care for your animals while you are sick  If you are sick with COVID-19, avoid contact with your pet, including petting, snuggling, being kissed or licked, and sharing food  If you must care for your pet or be around animals while you are sick, wash your hands before and after you interact with pets and wear a facemask  See COVID-19 and Animals for more information      Call ahead before visiting your doctor    If you have a medical appointment, call the healthcare provider and tell them that you have or may have COVID-19  This will help the healthcare providers office take steps to keep other people from getting infected or exposed  Wear a facemask    You should wear a facemask when you are around other people (e g , sharing a room or vehicle) or pets and before you enter a healthcare providers office  If you are not able to wear a facemask (for example, because it causes trouble breathing), then people who live with you should not stay in the same room with you, or they should wear a facemask if they enter your room  Cover your coughs and sneezes    Cover your mouth and nose with a tissue when you cough or sneeze  Throw used tissues in a lined trash can  Immediately wash your hands with soap and water for at least 20 seconds or, if soap and water are not available, clean your hands with an alcohol-based hand  that contains at least 60% alcohol  Clean your hands often    Wash your hands often with soap and water for at least 20 seconds, especially after blowing your nose, coughing, or sneezing; going to the bathroom; and before eating or preparing food  If soap and water are not readily available, use an alcohol-based hand  with at least 60% alcohol, covering all surfaces of your hands and rubbing them together until they feel dry  Soap and water are the best option if hands are visibly dirty  Avoid touching your eyes, nose, and mouth with unwashed hands  Avoid sharing personal household items    You should not share dishes, drinking glasses, cups, eating utensils, towels, or bedding with other people or pets in your home  After using these items, they should be washed thoroughly with soap and water  Clean all high-touch surfaces everyday    High touch surfaces include counters, tabletops, doorknobs, bathroom fixtures, toilets, phones, keyboards, tablets, and bedside tables  Also, clean any surfaces that may have blood, stool, or body fluids on them   Use a household cleaning spray or wipe, according to the label instructions  Labels contain instructions for safe and effective use of the cleaning product including precautions you should take when applying the product, such as wearing gloves and making sure you have good ventilation during use of the product  Monitor your symptoms    Seek prompt medical attention if your illness is worsening (e g , difficulty breathing)  Before seeking care, call your healthcare provider and tell them that you have, or are being evaluated for, COVID-19  Put on a facemask before you enter the facility  These steps will help the healthcare providers office to keep other people in the office or waiting room from getting infected or exposed  Ask your healthcare provider to call the local or [facility] health department  Persons who are placed under active monitoring or facilitated self-monitoring should follow instructions provided by their local health department or occupational health professionals, as appropriate  If you have a medical emergency and need to call 911, notify the dispatch personnel that you have, or are being evaluated for COVID-19  If possible, put on a facemask before emergency medical services arrive      Discontinuing home isolation    Patients with confirmed COVID-19 should remain under home isolation precautions until the following conditions are met:   - They have had no fever for at least 24 hours (that is one full day of no fever without the use medicine that reduces fevers)  AND  - other symptoms have improved (for example, when their cough or shortness of breath have improved)  AND  - If had mild or moderate illness, at least 10 days have passed since their symptoms first appeared or if severe illness (needed oxygen) or immunosuppressed, at least 20 days have passed since symptoms first appeared  Patients with confirmed COVID-19 should also notify close contacts (including their workplace) and ask that they self-quarantine  Currently, close contact is defined as being within 6 feet for 15 minutes or more from the period 24 hours starting 48 hours before symptom onset to the time at which the patient went into isolation  Close contacts of patients diagnosed with COVID-19 should be instructed by the patient to self-quarantine for 14 days from the last time of their last contact with the patient  Source: [employer]  Acute Bronchitis   WHAT YOU NEED TO KNOW:   Acute bronchitis is swelling and irritation in your lungs  It is usually caused by a virus and most often happens in the winter  Bronchitis may also be caused by bacteria or by a chemical irritant, such as smoke  DISCHARGE INSTRUCTIONS:   Return to the emergency department if:   · You cough up blood  · Your lips or fingernails turn blue  · You feel like you are not getting enough air when you breathe  Call your doctor if:   · Your symptoms do not go away or get worse, even after treatment  · Your cough does not get better within 4 weeks  · You have questions or concerns about your condition or care  Medicines: You may  need any of the following:  · Cough suppressants  decrease your urge to cough  · Decongestants  help loosen mucus in your lungs and make it easier to cough up  This can help you breathe easier  · Inhalers  may be given  Your healthcare provider may give you one or more inhalers to help you breathe easier and cough less  An inhaler gives your medicine to open your airways  Ask your healthcare provider to show you how to use your inhaler correctly  · Antibiotics  may be given for up to 5 days if your bronchitis is caused by bacteria  · Acetaminophen  decreases pain and fever  It is available without a doctor's order  Ask how much to take and how often to take it  Follow directions   Read the labels of all other medicines you are using to see if they also contain acetaminophen, or ask your doctor or pharmacist  Acetaminophen can cause liver damage if not taken correctly  Do not use more than 4 grams (4,000 milligrams) total of acetaminophen in one day  · NSAIDs  help decrease swelling and pain or fever  This medicine is available with or without a doctor's order  NSAIDs can cause stomach bleeding or kidney problems in certain people  If you take blood thinner medicine, always ask your healthcare provider if NSAIDs are safe for you  Always read the medicine label and follow directions  · Take your medicine as directed  Contact your healthcare provider if you think your medicine is not helping or if you have side effects  Tell him of her if you are allergic to any medicine  Keep a list of the medicines, vitamins, and herbs you take  Include the amounts, and when and why you take them  Bring the list or the pill bottles to follow-up visits  Carry your medicine list with you in case of an emergency  Self-care:   · Drink liquids as directed  You may need to drink more liquids than usual to stay hydrated  Ask how much liquid to drink each day and which liquids are best for you  · Use a cool mist humidifier  to increase air moisture in your home  This may make it easier for you to breathe and help decrease your cough  · Get more rest   Rest helps your body to heal  Slowly start to do more each day  Rest when you feel it is needed  · Avoid irritants in the air  Avoid chemicals, fumes, and dust  Wear a face mask if you must work around dust or fumes  Stay inside on days when air pollution levels are high  If you have allergies, stay inside when pollen counts are high  Do not use aerosol products, such as spray-on deodorant, bug spray, and hair spray  · Do not smoke or be around others who are smoking  Nicotine and other chemicals in cigarettes and cigars can cause lung damage   Ask your healthcare provider for information if you currently smoke and need help to quit  E-cigarettes or smokeless tobacco still contain nicotine  Talk to your healthcare provider before you use these products  Prevent acute bronchitis:       · Ask about vaccines you may need  Get a flu vaccine each year as soon as recommended, usually in September or October  Ask your healthcare provider if you should also get a pneumonia or COVID-19 vaccine  Your healthcare provider can tell you if you should also get other vaccines, and when to get them  · Prevent the spread of germs  You can decrease your risk for acute bronchitis and other illnesses by doing the following:     ? Wash your hands often with soap and water  Carry germ-killing hand lotion or gel with you  You can use the lotion or gel to clean your hands when soap and water are not available  ? Do not touch your eyes, nose, or mouth unless you have washed your hands first     ? Always cover your mouth when you cough to prevent the spread of germs  It is best to cough into a tissue or your shirt sleeve instead of into your hand  Ask those around you to cover their mouths when they cough  ? Try to avoid people who have a cold or the flu  If you are sick, stay away from others as much as possible  Follow up with your doctor as directed:  Write down questions you have so you will remember to ask them during your follow-up visits  © Copyright [employer] 2022 Information is for End User's use only and may not be sold, redistributed or otherwise used for commercial purposes  All illustrations and images included in CareNotes® are the copyrighted property of A D A M , Inc  or Negrita Javier   The above information is an  only  It is not intended as medical advice for individual conditions or treatments  Talk to your doctor, nurse or pharmacist before following any medical regimen to see if it is safe and effective for you    Sinusitis   WHAT YOU NEED TO KNOW:   Sinusitis is inflammation or infection of your sinuses  Sinusitis is most often caused by a virus  Acute sinusitis may last up to 12 weeks  Chronic sinusitis lasts longer than 12 weeks  Recurrent sinusitis means you have 4 or more infections in 1 year  DISCHARGE INSTRUCTIONS:   Return to the emergency department if:   · You have trouble breathing or wheezing that is getting worse  · You have a stiff neck, a fever, or a bad headache  · You cannot open your eye  · Your eyeball bulges out or you cannot move your eye  · You are more sleepy than normal, or you notice changes in your ability to think, move, or talk  · You have swelling of your forehead or scalp  Call your doctor if:   · You have vision changes, such as double vision  · Your eye and eyelid are red, swollen, and painful  · Your symptoms do not improve or go away after 10 days  · You have nausea and are vomiting  · Your nose is bleeding  · You have questions or concerns about your condition or care  Medicines: Your symptoms may go away on their own  Your healthcare provider may recommend watchful waiting for up to 10 days before starting antibiotics  You may need any of the following:  · Acetaminophen  decreases pain and fever  It is available without a doctor's order  Ask how much to take and how often to take it  Follow directions  Read the labels of all other medicines you are using to see if they also contain acetaminophen, or ask your doctor or pharmacist  Acetaminophen can cause liver damage if not taken correctly  Do not use more than 4 grams (4,000 milligrams) total of acetaminophen in one day  · NSAIDs , such as ibuprofen, help decrease swelling, pain, and fever  This medicine is available with or without a doctor's order  NSAIDs can cause stomach bleeding or kidney problems in certain people  If you take blood thinner medicine, always ask your healthcare provider if NSAIDs are safe for you   Always read the medicine label and follow directions  · Nasal steroid sprays  may help decrease inflammation in your nose and sinuses  · Decongestants  help reduce swelling and drain mucus in the nose and sinuses  They may help you breathe easier  · Antihistamines  help dry mucus in the nose and relieve sneezing  · Antibiotics  help treat or prevent a bacterial infection  · Take your medicine as directed  Contact your healthcare provider if you think your medicine is not helping or if you have side effects  Tell him or her if you are allergic to any medicine  Keep a list of the medicines, vitamins, and herbs you take  Include the amounts, and when and why you take them  Bring the list or the pill bottles to follow-up visits  Carry your medicine list with you in case of an emergency  Self-care:   · Rinse your sinuses as directed  Use a sinus rinse device to rinse your nasal passages with a saline (salt water) solution or distilled water  Do not use tap water  This will help thin the mucus in your nose and rinse away pollen and dirt  It will also help reduce swelling so you can breathe normally  · Use a humidifier  to increase air moisture in your home  This may make it easier for you to breathe and help decrease your cough  · Sleep with your head elevated  Place an extra pillow under your head before you go to sleep to help your sinuses drain  · Drink liquids as directed  Ask your healthcare provider how much liquid to drink each day and which liquids are best for you  Liquids will thin the mucus in your nose and help it drain  Avoid drinks that contain alcohol or caffeine  · Do not smoke, and avoid secondhand smoke  Nicotine and other chemicals in cigarettes and cigars can make your symptoms worse  Ask your healthcare provider for information if you currently smoke and need help to quit  E-cigarettes or smokeless tobacco still contain nicotine   Talk to your healthcare provider before you use these products  Prevent the spread of germs:   · Wash your hands often with soap and water  Wash your hands after you use the bathroom, change a child's diaper, or sneeze  Wash your hands before you prepare or eat food  · Stay away from people who are sick  Some germs spread easily and quickly through contact  Follow up with your doctor as directed: You may be referred to an ear, nose, and throat specialist  Write down your questions so you remember to ask them during your visits  © Copyright [employer] 2022 Information is for End User's use only and may not be sold, redistributed or otherwise used for commercial purposes  All illustrations and images included in CareNotes® are the copyrighted property of A D A M , Inc  or [facility] [place] Javier   The above information is an  only  It is not intended as medical advice for individual conditions or treatments  Talk to your doctor, nurse or pharmacist before following any medical regimen to see if it is safe and effective for you

## 2022-03-18 NOTE — PROGRESS NOTES
[facility]        NAME: Madelin Baer is a 42 y o  male  : 1975    MRN: [id number]  DATE: 2022  TIME: 6:36 PM    Pulse 89   Temp 97 9 °F (36 6 °C)   Resp 18   Ht 5' 7" (1 702 m)   Wt 99 8 kg (220 lb)   SpO2 98%   BMI 34 46 kg/m²     Assessment and Plan   Sinusitis, unspecified chronicity, unspecified location [J32 9]  1  Sinusitis, unspecified chronicity, unspecified location  Covid/Flu-Office Collect    XR chest pa & lateral    benzonatate (TESSALON PERLES) 100 mg capsule    azithromycin (ZITHROMAX) 250 mg tablet    albuterol (ProAir HFA) 90 mcg/act inhaler   2  Bronchitis  benzonatate (TESSALON PERLES) 100 mg capsule    azithromycin (ZITHROMAX) 250 mg tablet    albuterol (ProAir HFA) 90 mcg/act inhaler   3  Encounter for laboratory testing for COVID-19 virus  benzonatate (TESSALON PERLES) 100 mg capsule    azithromycin (ZITHROMAX) 250 mg tablet    albuterol (ProAir HFA) 90 mcg/act inhaler         Patient Instructions       Follow up with PCP in 3-5 days  Proceed to  ER if symptoms worsen  Chief Complaint     Chief Complaint   Patient presents with    Cough     pt reports body aches, fever, cough and states daughter has tested positive for Flu A         History of Present Illness       Pt with productive cough and yellow nasal  congestion x 5 days  Pt is covid and flu vaccinated       Review of Systems   Review of Systems   Constitutional: Negative  HENT: Positive for congestion  Eyes: Negative  Respiratory: Positive for cough  Cardiovascular: Negative  Gastrointestinal: Negative  Endocrine: Negative  Genitourinary: Negative  Musculoskeletal: Negative  Skin: Negative  Allergic/Immunologic: Negative  Neurological: Negative  Hematological: Negative  Psychiatric/Behavioral: Negative  All other systems reviewed and are negative          Current Medications       Current Outpatient Medications:     aspirin (ECOTRIN) 325 mg EC tablet, Take 1 tablet (325 mg total) by mouth 2 (two) times a day, Disp: 90 tablet, Rfl: 0    atorvastatin (LIPITOR) 40 mg tablet, Take 1 tablet (40 mg total) by mouth daily, Disp: 90 tablet, Rfl: 3    losartan (COZAAR) 50 mg tablet, Take 1 tablet (50 mg total) by mouth daily, Disp: 90 tablet, Rfl: 3    metoprolol succinate (TOPROL-XL) 50 mg 24 hr tablet, Take 1 tablet (50 mg total) by mouth daily, Disp: 90 tablet, Rfl: 3    albuterol (ProAir HFA) 90 mcg/act inhaler, Inhale 2 puffs every 6 (six) hours as needed for wheezing, Disp: 8 5 g, Rfl: 0    albuterol (Ventolin HFA) 90 mcg/act inhaler, Inhale 2 puffs every 6 (six) hours as needed for wheezing (Patient not taking: Reported on 3/18/2022 ), Disp: 18 g, Rfl: 3    azithromycin (ZITHROMAX) 250 mg tablet, Take 2 tablets today then 1 tablet daily x 4 days, Disp: 6 tablet, Rfl: 0    benzonatate (TESSALON PERLES) 100 mg capsule, Take 1 capsule (100 mg total) by mouth 3 (three) times a day as needed for cough, Disp: 20 capsule, Rfl: 0    fluticasone (FLOVENT HFA) 110 MCG/ACT inhaler, Inhale 2 puffs 2 (two) times a day Rinse mouth after use   (Patient not taking: Reported on 3/18/2022 ), Disp: 1 Inhaler, Rfl: 3    montelukast (SINGULAIR) 10 mg tablet, Take 1 tablet (10 mg total) by mouth daily at bedtime (Patient not taking: Reported on 3/18/2022 ), Disp: 30 tablet, Rfl: 3    ondansetron (ZOFRAN-ODT) 4 mg disintegrating tablet, Take 1 tablet (4 mg total) by mouth every 6 (six) hours as needed for nausea or vomiting (Patient not taking: Reported on 3/18/2022 ), Disp: 20 tablet, Rfl: 0    valACYclovir (VALTREX) 1,000 mg tablet, Take 1 tablet (1,000 mg total) by mouth 3 (three) times a day for 7 days, Disp: 21 tablet, Rfl: 0    Current Allergies     Allergies as of 03/18/2022    (No Known Allergies)            The following portions of the patient's history were reviewed and updated as appropriate: allergies, current medications, past family history, past medical history, past social history, past surgical history and problem list      Past Medical History:   Diagnosis Date    Asthma     CPAP (continuous positive airway pressure) dependence     Hypertension     Nasal disorder     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 patient     Sleep apnea        Past Surgical History:   Procedure Laterality Date    HERNIA REPAIR      [place] CORRJ HALLUX VALGUS W/SESMDC W/PROX PHLNX OSTEOT Right 4/21/2021    Procedure: Dorla Aliyah, And tibial sesamoid excision;  Surgeon: Ngozi Graves MD;  Location: [facility];  Service: Orthopedics       History reviewed  No pertinent family history  Medications have been verified  Objective   Pulse 89   Temp 97 9 °F (36 6 °C)   Resp 18   Ht 5' 7" (1 702 m)   Wt 99 8 kg (220 lb)   SpO2 98%   BMI 34 46 kg/m²        Physical Exam     Physical Exam  Vitals and nursing note reviewed  Constitutional:       Appearance: Normal appearance  He is normal weight  HENT:      Head: Normocephalic and atraumatic  Right Ear: Tympanic membrane, ear canal and external ear normal       Left Ear: Tympanic membrane, ear canal and external ear normal       Nose: Congestion and rhinorrhea present  Comments: Boggy mucosa max sinus tender to palp     Mouth/Throat:      Mouth: Mucous membranes are moist       Pharynx: Oropharynx is clear  Eyes:      Extraocular Movements: Extraocular movements intact  Conjunctiva/sclera: Conjunctivae normal       Pupils: Pupils are equal, round, and reactive to light  Cardiovascular:      Rate and Rhythm: Normal rate and regular rhythm  Pulses: Normal pulses  Heart sounds: Normal heart sounds  Pulmonary:      Effort: Pulmonary effort is normal       Breath sounds: Normal breath sounds  Abdominal:      General: Abdomen is flat  Bowel sounds are normal       Palpations: Abdomen is soft  Musculoskeletal:         General: Normal range of motion        Cervical back: Normal range of motion and neck supple  Skin:     General: Skin is warm  Capillary Refill: Capillary refill takes less than 2 seconds  Neurological:      General: No focal deficit present  Mental Status: He is alert and oriented to person, place, and time     Psychiatric:         Mood and Affect: Mood normal          Behavior: Behavior normal

## 2022-03-19 LAB
FLUAV RNA RESP QL NAA+PROBE: POSITIVE
FLUBV RNA RESP QL NAA+PROBE: NEGATIVE
SARS-COV-2 RNA RESP QL NAA+PROBE: NEGATIVE

## 2023-02-27 NOTE — PROGRESS NOTES
2/28/2023      Chief Complaint   Patient presents with   • Vasectomy     Consult         Assessment and Plan    41 y o  male managed by new patient    1  Desire for elective sterilization  - exam today as below  - informed consent signed today  - continue contraception  - rx ativan with  to/from on appt date  - shave scrotal/pubic hair day prior to appt date    2  Orchialgia  - no acute findings on physical examination, but mild tenderness on palpation  Recommend obtaining scrotal ultrasound prior to vasectomy  Plan to call with results  - reviewed supportive measures      Call with results of ultrasound  Return for vasectomy as scheduled  Patient and fiance amenable with plan and advised to call sooner with any questions or issues  History of Present Illness  Halima Phillips is a 41 y o  male here for evaluation of VASECTOMY CONSULT  Presents with fiance  History of genitourinary or groin trauma or surgery- yes, right inguinal hernia repair in 2006  Fathered children-yes, 5and 10years of age   Personal and/or mutual desire for permanent sterility- yes, fiance  Current contraceptive method- yes   Work/manual labor/lifting- yes, RN in hospital   Voiding issues- none  Bleeding issues/thinners- none  Allergies to lidocaine/marcaine/betadine/chromic- none    The patient presents requesting elective sterilization vasectomy  We discussed that vasectomy is in operation performed in the office in order to provide elective sterilization  This procedure should be considered a permanent option  Although there are subspecialists who perform vasectomy reversals, these operations are not 100% successful and are often not covered by insurance meaning they can come with a large out-of-pocket cost  The patient understands this  We reviewed the procedure in depth  Risk and benefits of the procedure were discussed and reviewed  Informed consent was obtained in the office today   The patient was prescribed a benzodiazepine to take one hour prior to the procedure to assist with his comfort  He understands that he will require transportation to and from the office that day if he is to use the benzodiazepine  He also understands he will require semen analysis testing at 8 weeks post procedure to ensure full sterilization  In the interim, he will require contraception during intercourse to avoid an undesired pregnancy  Usually, patients are out of work for 2-3 days  We recommend tight fitting scrotal support following the procedure along with ice packs applied to the scrotum 15 minutes on and 15 minutes off for the first 24 hours  We discussed that we do send the patient home with short course of anti-inflammatory and/or narcotic pain medication  After this discussion, the patient agrees to proceed  We will schedule him in the near future  He agrees to take oral sedative - ativan 2mg one hour prior to procedure  Patient also reports intermittent left sided testicle pain for the last few years  He describes as an intermittent sharp ache  Patient also notes left testicle feeling softer and occasional discomfort with palpation  He denies any scrotal swelling or erythema  Patient denies any lower urinary tract symptoms, gross hematuria, or dysuria  Patient works as a registered nurse on med-surg unit in [place]  Review of Systems   Constitutional: Negative  HENT: Negative  Respiratory: Negative  Cardiovascular: Negative  Gastrointestinal: Negative  Genitourinary: Positive for testicular pain (left side)  Negative for decreased urine volume, difficulty urinating, dysuria, flank pain, frequency, hematuria, scrotal swelling and urgency  Musculoskeletal: Negative  Skin: Negative  Neurological: Negative  Psychiatric/Behavioral: Negative                   Vitals  Vitals:    02/28/23 1354   BP: 130/90   BP Location: Left arm   Patient Position: Sitting   Cuff Size: Large Pulse: 84   SpO2: 99%   Weight: 98 kg (216 lb)   Height: 5' 7" (1 702 m)       Physical Exam  Genitourinary:     Penis: Normal        Testes: Normal       Comments: Penis circumcised, testicles descended bilaterally, mild tenderness on palpation to left side of scrotum, no hard mass, but soft palpable finding above left testicle  No erythema or edema on scrotum  General: Well appearing, no distress, appears stated age  HEENT:  Normocephalic, atraumatic  Conjunctiva clear  Respiratory: Nonlabored respirations, no wheeze or cough  Abdomen:  Soft nontender without hernia  No suprapubic or CVA tenderness  Genitourinary: Circumcised penis, normal phallus, orthotopic patent meatus  Testes smooth descended bilaterally into the scrotum  Palpably normal spermatic cord and vas deferens bilaterally  Musculoskeletal:  Normal range of motion and gait without defecit  Neuro: No gross neurologic defect or abnormality  Steady unassisted gait   Speech and affect normal   Dermatologic: skin warm, dry; no rash erythema or ecchymosis      Past History  Past Medical History:   Diagnosis Date   • Asthma    • CPAP (continuous positive airway pressure) dependence    • Hypertension    • Nasal disorder    • 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 patient    • Sleep apnea      Social History     Socioeconomic History   • Marital status: Single     Spouse name: None   • Number of children: None   • Years of education: None   • Highest education level: None   Occupational History   • None   Tobacco Use   • Smoking status: Never   • Smokeless tobacco: Never   Vaping Use   • Vaping Use: Never used   Substance and Sexual Activity   • Alcohol use: Never   • Drug use: Never   • Sexual activity: None   Other Topics Concern   • None   Social History Narrative   • None     Social Determinants of Health     Financial Resource Strain: Not on file   Food Insecurity: Not on file   Transportation Needs: Not on file   Physical Activity: Not on file Stress: Not on file   Social Connections: Not on file   Intimate Partner Violence: Not on file   Housing Stability: Not on file     Social History     Tobacco Use   Smoking Status Never   Smokeless Tobacco Never     No family history on file      The following portions of the patient's history were reviewed and updated as appropriate: allergies, current medications, past medical history, past social history, past surgical history and problem list     Results  No results found for this or any previous visit (from the past 1 hour(s)) ]  Lab Results   Component Value Date    PSA 0 3 11/23/2019    PSA 0 2 03/02/2018    PSA 0 3 12/15/2015     Lab Results   Component Value Date    GLUCOSE 91 12/15/2015    CALCIUM 8 9 11/23/2019     12/15/2015    K 4 9 11/23/2019    CO2 29 11/23/2019     11/23/2019    BUN 21 11/23/2019    CREATININE 1 15 11/23/2019     Lab Results   Component Value Date    WBC 7 81 12/09/2019    HGB 14 2 12/09/2019    HCT 42 1 12/09/2019    MCV 90 12/09/2019     12/09/2019

## 2023-02-28 ENCOUNTER — OFFICE VISIT (OUTPATIENT)
Dept: UROLOGY | Facility: AMBULATORY SURGERY CENTER | Age: 48
End: 2023-02-28

## 2023-02-28 VITALS
HEART RATE: 84 BPM | HEIGHT: 67 IN | DIASTOLIC BLOOD PRESSURE: 90 MMHG | OXYGEN SATURATION: 99 % | BODY MASS INDEX: 33.9 KG/M2 | SYSTOLIC BLOOD PRESSURE: 130 MMHG | WEIGHT: 216 LBS

## 2023-02-28 DIAGNOSIS — Z30.09 VASECTOMY EVALUATION: Primary | ICD-10-CM

## 2023-02-28 DIAGNOSIS — N50.819 PAIN IN TESTICLE, UNSPECIFIED LATERALITY: ICD-10-CM

## 2023-02-28 RX ORDER — LORAZEPAM 2 MG/1
2 TABLET ORAL
Qty: 1 TABLET | Refills: 0 | Status: SHIPPED | OUTPATIENT
Start: 2023-02-28

## 2023-03-07 ENCOUNTER — HOSPITAL ENCOUNTER (OUTPATIENT)
Dept: ULTRASOUND IMAGING | Facility: HOSPITAL | Age: 48
Discharge: HOME | End: 2023-03-07

## 2023-03-07 DIAGNOSIS — N50.819 PAIN IN TESTICLE, UNSPECIFIED LATERALITY: ICD-10-CM

## 2023-03-08 ENCOUNTER — TELEPHONE (OUTPATIENT)
Dept: UROLOGY | Facility: HOSPITAL | Age: 48
End: 2023-03-08

## 2023-03-08 DIAGNOSIS — K40.90 NON-RECURRENT UNILATERAL INGUINAL HERNIA WITHOUT OBSTRUCTION OR GANGRENE: Primary | ICD-10-CM

## 2023-03-08 NOTE — TELEPHONE ENCOUNTER
Called patient to discuss results of recent scrotal ultrasound  He had presented to the office in consultation for vasectomy evaluation, but reported left-sided orchialgia  This prompted an ultrasound for further evaluation prior to vasectomy  Findings noted moderate left inguinal hernia  Reviewed with Dr Dariel Chand who recommends referral to general surgery for hernia repair, and vasectomy can be performed at the same time under general anesthesia  Patient verbalized understanding  Order for general surgery consult placed  Reviewed supportive measures  He was advised to call with any additional questions or issues

## 2023-03-16 NOTE — PROGRESS NOTES
Assessment/Plan:   Stephon Bolton is a 41 y o male who is here for:   Chief Complaint   Patient presents with   • Inguinal Hernia     LIH; increase pain for a couple of years        Scrotal ultrasound shows a moderately sized fat filled inguinal hernia on the left  (Scrotal hernia)  Combined procedure for elective sterilization/vasectomy - will coordinate with urology  Scrotal component is small on examination so will try to repair this robotically but if can not reduce will open patient understands this    History of a right inguinal hernia repair in 2006  History of pyloric stenosis surgery with scars along the abdomen as a child  Has seen urology for this and now is here for consultation for combined procedure    Plan:    1  Patient has been off all medications because he has not followed up with PCP he understands he needs to be seen to manage BP prior to surgery so we will get medical clearance  2  robotic assisted laparoscopic with mesh  repair of Left inguinal hernia, with scrotal component we will coordinate with urology who is in the [place] [facility] to have them come in for vasectomy before or after the case  3  Patient is due for his first screening colonoscopy which we can do before or after his surgical repair    Body mass index is 34 17 kg/m²  Post Op Pain Management: Norco    Preoperative Clearance: Medical      In preparation for this visit all relevant and known prior office notes, prior consultations, emergency room visits, blood work results, and imaging studies were personally reviewed  A total of  30 minutes was spent reviewing all of this information, caring for this patient, providing differential diagnosis, instructions for management, counseling and coordination of care  This also includes planning surgical intervention where indicated  Patient understands hernia occurrence or re-occurrence risk is higher in a diabetic, tobacco user, with elevated BMIs         - Patient has been instructed to avoid herbs or non-directed vitamins the week prior to surgery to ensure no drug interactions with perioperative surgical and anesthetic medications  - Patient should continue beta-blocker medication up through and including the day of surgery but hold any other hypertensive medications, including diuretics, unless instructed by PCP or anesthesia  - Patient should continue his statin medication up through and including the day of surgery   - Hold metformin , If on this medication, the morning of surgery and do not resume until 48 hours AFTER surgery to avoid risk of lactic acidosis  Do not resume if eGFR is < 30  - Insulin Management:If on Insulin, patient advised to call PCP for explicit instructions  In general, will need to take one-half normal dose am of surgery but pt advised to consult PCP before making any changes  - Patient has been instructed to avoid aspirin containing medications or non-steroidal anti-inflammatory drugs for SEVEN days preceding surgery  ______________________________________________________  CC:  Chief Complaint   Patient presents with   • Inguinal Hernia     LIH; increase pain for a couple of years      HPI:  Ana Richter is a 41 y o male who was referred for evaluation of Inguinal Hernia (LIH; increase pain for a couple of years )    Currently complaining of persistent     left inguinal hernia worse with coughing, lifting, standing, walking,   no nausea and no vomiting,     Duration of pain: Intermittent  and over 2 years        regular bowel movement  Prior abdominal surgery: Yes:  [facility] open repair   Pyloric stenosis surgery as a child    Smoking Status: Non-smoker     ROS:  General ROS: negative  negative for - chills, fatigue, fever or night sweats, weight loss  Respiratory ROS: no cough, shortness of breath, or wheezing  Cardiovascular ROS: no chest pain or dyspnea on exertion  Genito-Urinary ROS: no dysuria, trouble voiding, or hematuria  Musculoskeletal ROS: negative for - gait disturbance, joint pain or muscle pain  Neurological ROS: no TIA or stroke symptoms  GI ROS: see HPI  Skin ROS: no new rashes or lesions   Lymphatic ROS: no new adenopathy noted by pt  GYN ROS: see HPI, no new GYN history or bleeding noted  Psy ROS: no new mental or behavioral disturbances         Patient Active Problem List   Diagnosis   • Mild persistent asthma without complication   • MANOJ (obstructive sleep apnea)   • Chronic cough   • Class 2 obesity due to excess calories with body mass index (BMI) of 36 0 to 36 9 in adult   • Essential (primary) hypertension   • Hallux interphalangeus, acquired, right       Allergies:  Patient has no known allergies  Current Outpatient Medications:   •  albuterol (Ventolin HFA) 90 mcg/act inhaler, Inhale 2 puffs every 6 (six) hours as needed for wheezing, Disp: 18 g, Rfl: 3  •  albuterol (ProAir HFA) 90 mcg/act inhaler, Inhale 2 puffs every 6 (six) hours as needed for wheezing (Patient not taking: Reported on 2/28/2023), Disp: 8 5 g, Rfl: 0  •  aspirin (ECOTRIN) 325 mg EC tablet, Take 1 tablet (325 mg total) by mouth 2 (two) times a day (Patient not taking: Reported on 3/20/2023), Disp: 90 tablet, Rfl: 0  •  atorvastatin (LIPITOR) 40 mg tablet, Take 1 tablet (40 mg total) by mouth daily (Patient not taking: Reported on 3/20/2023), Disp: 90 tablet, Rfl: 3  •  fluticasone (FLOVENT HFA) 110 MCG/ACT inhaler, Inhale 2 puffs 2 (two) times a day Rinse mouth after use   (Patient not taking: Reported on 3/20/2023), Disp: 1 Inhaler, Rfl: 3  •  LORazepam (ATIVAN) 2 mg tablet, Take 1 tablet (2 mg total) by mouth 60 minutes pre-procedure (Patient not taking: Reported on 3/20/2023), Disp: 1 tablet, Rfl: 0  •  losartan (COZAAR) 50 mg tablet, Take 1 tablet (50 mg total) by mouth daily (Patient not taking: Reported on 2/28/2023), Disp: 90 tablet, Rfl: 3  •  metoprolol succinate (TOPROL-XL) 50 mg 24 hr tablet, Take 1 tablet (50 mg total) by mouth daily (Patient not taking: Reported on 2/28/2023), Disp: 90 tablet, Rfl: 3  •  montelukast (SINGULAIR) 10 mg tablet, Take 1 tablet (10 mg total) by mouth daily at bedtime (Patient not taking: Reported on 3/20/2023), Disp: 30 tablet, Rfl: 3    Past Medical History:   Diagnosis Date   • Asthma    • CPAP (continuous positive airway pressure) dependence    • Hypertension    • Nasal disorder    • 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 patient    • Sleep apnea        Past Surgical History:   Procedure Laterality Date   • HERNIA REPAIR     • TN CORRJ HALLUX VALGUS W/SESMDC W/PROX PHLNX OSTEOT Right 4/21/2021    Procedure: Nadean Chandrika, And tibial sesamoid excision;  Surgeon: Radha Flores MD;  Location: [facility];  Service: Orthopedics       History reviewed  No pertinent family history  reports that he has never smoked  He has never used smokeless tobacco  He reports that he does not drink alcohol and does not use drugs  Vitals:    03/20/23 1059   BP: (!) 170/110   Pulse: 82   Temp: 97 6 °F (36 4 °C)        PHYSICAL EXAM  General Appearance:    Alert, cooperative, no distress  Head:    Normocephalic without obvious abnormality   Eyes:    PERRL, conjunctiva/corneas clear    Neck:   Supple, no adenopathy, no JVD   Back:     Symmetric, no spinal or CVA tenderness   Lungs:     Clear to auscultation bilaterally, no wheezing or rhonchi   Heart:    Regular  rate and rhythm, S1 and S2 normal, no murmur     Abdomen:     abdomen is soft without significant tenderness, masses, organomegaly or guarding    left sided inguinal hernia    Scrotum: normal testicles bilateral  and hernia extends to scrotum       Extremities:   Extremities normal  No clubbing, cyanosis or edema   Psych:   Normal Affect, AOx3  Neurologic:  Skin:   CNII-XII intact   Strength symmetric, speech intact    Warm, dry, intact, no visible rashes or lesions             Informed consent for procedure was personally discussed, reviewed, and signed by Dr Jhon Hightower  Discussion by Dr Jhon Hightower was carried out regarding risks, benefits, and alternatives with the patient  Risks include but are not limited to:  bleeding, infection, and delayed wound healing or an open wound, pulmonary embolus, leaks from bowel or bile ducts or other viscus, transfusions, death  Discussed in further detail the more common complications and their rates of occurrence   was used if necessary  Patient expressed understanding of the issues discussed and wished/consented to proceed  All questions were answered by Dr Jhon Hightower  Discussion performed between patient and the provider signing below       Signature:   MD Enoc Taveras PA-C    Date: 3/20/2023 Time: 11:41 AM

## 2023-03-20 ENCOUNTER — TELEPHONE (OUTPATIENT)
Dept: SURGERY | Facility: CLINIC | Age: 48
End: 2023-03-20

## 2023-03-20 ENCOUNTER — OFFICE VISIT (OUTPATIENT)
Dept: SURGERY | Facility: CLINIC | Age: 48
End: 2023-03-20

## 2023-03-20 VITALS
WEIGHT: 218.2 LBS | BODY MASS INDEX: 34.25 KG/M2 | DIASTOLIC BLOOD PRESSURE: 110 MMHG | HEART RATE: 82 BPM | SYSTOLIC BLOOD PRESSURE: 170 MMHG | HEIGHT: 67 IN | TEMPERATURE: 97.6 F

## 2023-03-20 DIAGNOSIS — I10 HYPERTENSION, UNSPECIFIED TYPE: ICD-10-CM

## 2023-03-20 DIAGNOSIS — K40.90 SCROTAL HERNIA: ICD-10-CM

## 2023-03-20 DIAGNOSIS — K40.90 NON-RECURRENT UNILATERAL INGUINAL HERNIA WITHOUT OBSTRUCTION OR GANGRENE: Primary | ICD-10-CM

## 2023-03-20 NOTE — TELEPHONE ENCOUNTER
Called and left message with Urology for Dr Aroldo Lyle / surgery scheduler  Patient to have vasc done while under anesthesia for hernia repair  Repair is tentatively scheduled for 4/19 - depending on Urology availability  Was told to call back if I didn't hear anything within 48 hours

## 2023-03-21 ENCOUNTER — PREP FOR PROCEDURE (OUTPATIENT)
Dept: UROLOGY | Facility: AMBULATORY SURGERY CENTER | Age: 48
End: 2023-03-21

## 2023-03-21 DIAGNOSIS — Z30.2 ENCOUNTER FOR STERILIZATION: Primary | ICD-10-CM

## 2023-03-22 ENCOUNTER — TELEPHONE (OUTPATIENT)
Dept: SURGERY | Facility: CLINIC | Age: 48
End: 2023-03-22

## 2023-03-22 ENCOUNTER — PREP FOR PROCEDURE (OUTPATIENT)
Dept: SURGERY | Facility: CLINIC | Age: 48
End: 2023-03-22

## 2023-03-22 DIAGNOSIS — K40.90 NON-RECURRENT UNILATERAL INGUINAL HERNIA WITHOUT OBSTRUCTION OR GANGRENE: Primary | ICD-10-CM

## 2023-03-22 NOTE — TELEPHONE ENCOUNTER
Unable to coordinate anything timely with urology  Patient has decided to have two separate procedures vs wait until May/June to have procedures completed

## 2023-03-24 NOTE — TELEPHONE ENCOUNTER
Rec'd faxed notification from [facility] [place] [facility] that prior authorization is NOT required for CPT code #27050  Also received completed Medical Clearance  Scanned both under Media Tab

## 2023-03-30 ENCOUNTER — OFFICE VISIT (OUTPATIENT)
Dept: SURGERY | Facility: CLINIC | Age: 48
End: 2023-03-30

## 2023-03-30 VITALS
TEMPERATURE: 97.5 F | HEIGHT: 67 IN | DIASTOLIC BLOOD PRESSURE: 90 MMHG | SYSTOLIC BLOOD PRESSURE: 152 MMHG | WEIGHT: 219.8 LBS | HEART RATE: 78 BPM | BODY MASS INDEX: 34.5 KG/M2

## 2023-03-30 DIAGNOSIS — K40.90 LEFT INGUINAL HERNIA: Primary | ICD-10-CM

## 2023-04-06 ENCOUNTER — APPOINTMENT (OUTPATIENT)
Dept: LAB | Facility: CLINIC | Age: 48
End: 2023-04-06

## 2023-04-06 DIAGNOSIS — I10 ESSENTIAL HYPERTENSION, MALIGNANT: ICD-10-CM

## 2023-04-06 DIAGNOSIS — E78.2 MIXED HYPERLIPIDEMIA: ICD-10-CM

## 2023-04-06 LAB
ALBUMIN SERPL BCP-MCNC: 4.1 G/DL (ref 3.5–5)
ALP SERPL-CCNC: 65 U/L (ref 34–104)
ALT SERPL W P-5'-P-CCNC: 15 U/L (ref 7–52)
ANION GAP SERPL CALCULATED.3IONS-SCNC: 6 MMOL/L (ref 4–13)
AST SERPL W P-5'-P-CCNC: 16 U/L (ref 13–39)
BASOPHILS # BLD AUTO: 0.06 THOUSANDS/ÂΜL (ref 0–0.1)
BASOPHILS NFR BLD AUTO: 1 % (ref 0–1)
BILIRUB SERPL-MCNC: 0.64 MG/DL (ref 0.2–1)
BUN SERPL-MCNC: 19 MG/DL (ref 5–25)
CALCIUM SERPL-MCNC: 8.8 MG/DL (ref 8.4–10.2)
CHLORIDE SERPL-SCNC: 106 MMOL/L (ref 96–108)
CHOLEST SERPL-MCNC: 103 MG/DL
CO2 SERPL-SCNC: 26 MMOL/L (ref 21–32)
CREAT SERPL-MCNC: 0.99 MG/DL (ref 0.6–1.3)
EOSINOPHIL # BLD AUTO: 0.5 THOUSAND/ÂΜL (ref 0–0.61)
EOSINOPHIL NFR BLD AUTO: 6 % (ref 0–6)
ERYTHROCYTE [DISTWIDTH] IN BLOOD BY AUTOMATED COUNT: 12 % (ref 11.6–15.1)
ERYTHROCYTE [SEDIMENTATION RATE] IN BLOOD: 4 MM/HOUR (ref 0–14)
GFR SERPL CREATININE-BSD FRML MDRD: 90 ML/MIN/1.73SQ M
GLUCOSE P FAST SERPL-MCNC: 94 MG/DL (ref 65–99)
HCT VFR BLD AUTO: 40.7 % (ref 36.5–49.3)
HDLC SERPL-MCNC: 30 MG/DL
HGB BLD-MCNC: 13.7 G/DL (ref 12–17)
IMM GRANULOCYTES # BLD AUTO: 0.03 THOUSAND/UL (ref 0–0.2)
IMM GRANULOCYTES NFR BLD AUTO: 0 % (ref 0–2)
LDH SERPL-CCNC: 151 U/L (ref 140–271)
LDLC SERPL CALC-MCNC: 53 MG/DL (ref 0–100)
LYMPHOCYTES # BLD AUTO: 2.84 THOUSANDS/ÂΜL (ref 0.6–4.47)
LYMPHOCYTES NFR BLD AUTO: 35 % (ref 14–44)
MCH RBC QN AUTO: 30.4 PG (ref 26.8–34.3)
MCHC RBC AUTO-ENTMCNC: 33.7 G/DL (ref 31.4–37.4)
MCV RBC AUTO: 90 FL (ref 82–98)
MONOCYTES # BLD AUTO: 0.66 THOUSAND/ÂΜL (ref 0.17–1.22)
MONOCYTES NFR BLD AUTO: 8 % (ref 4–12)
NEUTROPHILS # BLD AUTO: 3.96 THOUSANDS/ÂΜL (ref 1.85–7.62)
NEUTS SEG NFR BLD AUTO: 50 % (ref 43–75)
NONHDLC SERPL-MCNC: 73 MG/DL
NRBC BLD AUTO-RTO: 0 /100 WBCS
PLATELET # BLD AUTO: 246 THOUSANDS/UL (ref 149–390)
PMV BLD AUTO: 10.3 FL (ref 8.9–12.7)
POTASSIUM SERPL-SCNC: 3.9 MMOL/L (ref 3.5–5.3)
PROT SERPL-MCNC: 6.7 G/DL (ref 6.4–8.4)
PSA SERPL-MCNC: 0.3 NG/ML (ref 0–4)
RBC # BLD AUTO: 4.5 MILLION/UL (ref 3.88–5.62)
SODIUM SERPL-SCNC: 138 MMOL/L (ref 135–147)
TRIGL SERPL-MCNC: 100 MG/DL
TSH SERPL DL<=0.05 MIU/L-ACNC: 1.79 UIU/ML (ref 0.45–4.5)
VIT B12 SERPL-MCNC: 405 PG/ML (ref 100–900)
WBC # BLD AUTO: 8.05 THOUSAND/UL (ref 4.31–10.16)

## 2023-04-20 ENCOUNTER — TELEPHONE (OUTPATIENT)
Dept: SURGERY | Facility: CLINIC | Age: 48
End: 2023-04-20

## 2023-04-20 NOTE — TELEPHONE ENCOUNTER
S/P HERNIA REPAIR 4/19/23    Called and spoke with patient  He denies any F/V/N/C and he has not had a bowel movement  He is aware to start some stool softeners to help  He is aware to remove all the dressing on top of his incision and shower normally  No antibacterial  creams, sprays, or ointments  He must keep the incision dry and clean as best as possible  I did confirm his post op appointment  He is aware to call the office with any questions or concerns  No path sent

## 2023-05-01 NOTE — PROGRESS NOTES
Assessment/Plan:   Elier Looney is a 57 y o male who comes in today for postoperative check after [facility] repair with Dr Pat Aldana on 4/19/23  Patient is pleased with surgical results  Pathology: None sent    Patient has a return to work date of May 30th with no restrictions  Postoperative restrictions reviewed, including specific lifting and exercise restrictions  All questions answered  ___________________________________________________  HPI:  Elier Looney is a 57 y o male who comes in today for postoperative check after robotic [facility] repair with Dr Pat Aldana on 4/19/23  Currently doing well without problems, no fever or chills,no nausea and no vomiting  Denies chest pain and troubles breathing  Reports pain 5/10 controlled with Tylenol  Was constipated but not going regularly  ROS:  General ROS: negative for - chills, fatigue, fever or night sweats, weight loss  Respiratory ROS: no cough, shortness of breath, or wheezing  Cardiovascular ROS: no chest pain or dyspnea on exertion  Genito-Urinary ROS: no dysuria, trouble voiding, or hematuria  Musculoskeletal ROS: negative for - gait disturbance, joint pain or muscle pain  Neurological ROS: no TIA or stroke symptoms    GI ROS: see HPI  Skin ROS: no new rashes or lesions   Lymphatic ROS: no new adenopathy noted by pt  GYN ROS: see HPI, no new GYN history or bleeding noted  Psy ROS: no new mental or behavioral disturbances     Patient Active Problem List   Diagnosis    Mild persistent asthma without complication    MANOJ (obstructive sleep apnea)    Chronic cough    Class 2 obesity due to excess calories with body mass index (BMI) of 36 0 to 36 9 in adult    Essential (primary) hypertension    Hallux interphalangeus, acquired, right    CPAP (continuous positive airway pressure) dependence    Hyperlipidemia         Allergies:   Patient has no known allergies        Current Outpatient Medications:     atorvastatin (LIPITOR) 40 mg tablet, Take 1 tablet (40 mg total) by mouth daily, Disp: 90 tablet, Rfl: 3    losartan (COZAAR) 50 mg tablet, Take 1 tablet (50 mg total) by mouth daily, Disp: 90 tablet, Rfl: 3    metoprolol succinate (TOPROL-XL) 50 mg 24 hr tablet, Take 1 tablet (50 mg total) by mouth daily, Disp: 90 tablet, Rfl: 3    albuterol (Ventolin HFA) 90 mcg/act inhaler, Inhale 2 puffs every 6 (six) hours as needed for wheezing (Patient not taking: Reported on 5/4/2023), Disp: 18 g, Rfl: 3    aspirin 81 mg chewable tablet, Chew 81 mg daily (Patient not taking: Reported on 5/4/2023), Disp: , Rfl:     Past Medical History:   Diagnosis Date    Asthma     CPAP (continuous positive airway pressure) dependence     Hyperlipidemia     Hypertension     Nasal disorder     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 patient     Sleep apnea        Past Surgical History:   Procedure Laterality Date    CARDIAC CATHETERIZATION      HERNIA REPAIR Right     inguinal    NASAL DILATION      DE CORRJ HALLUX VALGUS W/SESMDC W/PROX PHLNX OSTEOT Right 04/21/2021    Procedure: Christina Leonidasa, And tibial sesamoid excision;  Surgeon: Hermilo Edge MD;  Location: [facility];  Service: Orthopedics    DE LAPAROSCOPY SURG RPR INITIAL INGUINAL HERNIA Left 4/19/2023    Procedure: REPAIR HERNIA INGUINAL W/ ROBOT, LYSIS OF ADHESION;  Surgeon: Stefan Miranda MD;  Location: [facility];  Service: General       No family history on file  reports that he has never smoked  He has never used smokeless tobacco  He reports that he does not drink alcohol and does not use drugs      Vitals:    05/04/23 0919   BP: 126/74   Pulse: 94   Temp: 97 5 °F (36 4 °C)        PHYSICAL EXAM  General: normal, cooperative, no distress  Abdominal: soft, nondistended or nontender  Incision: clean, dry, and intact and healing well    Gloria Lindsey PA-C    Date: 5/4/2023 Time: 9:57 AM

## 2023-05-04 ENCOUNTER — OFFICE VISIT (OUTPATIENT)
Dept: SURGERY | Facility: CLINIC | Age: 48
End: 2023-05-04

## 2023-05-04 VITALS
HEART RATE: 94 BPM | HEIGHT: 65 IN | SYSTOLIC BLOOD PRESSURE: 126 MMHG | TEMPERATURE: 97.5 F | DIASTOLIC BLOOD PRESSURE: 74 MMHG | BODY MASS INDEX: 35.89 KG/M2 | WEIGHT: 215.4 LBS

## 2023-05-04 DIAGNOSIS — Z09 POSTOP CHECK: Primary | ICD-10-CM

## 2023-09-15 ENCOUNTER — APPOINTMENT (OUTPATIENT)
Dept: LAB | Age: 48
End: 2023-09-15
Payer: COMMERCIAL

## 2023-09-15 ENCOUNTER — APPOINTMENT (OUTPATIENT)
Dept: LAB | Age: 48
End: 2023-09-15

## 2023-09-15 DIAGNOSIS — Z00.8 HEALTH EXAMINATION IN POPULATION SURVEY: ICD-10-CM

## 2023-09-15 DIAGNOSIS — E29.1 3-OXO-5 ALPHA-STEROID DELTA 4-DEHYDROGENASE DEFICIENCY: ICD-10-CM

## 2023-09-15 LAB
CHOLEST SERPL-MCNC: 135 MG/DL
EST. AVERAGE GLUCOSE BLD GHB EST-MCNC: 126 MG/DL
FSH SERPL-ACNC: 6.2 MIU/ML
HBA1C MFR BLD: 6 %
HDLC SERPL-MCNC: 31 MG/DL
LDLC SERPL CALC-MCNC: 62 MG/DL (ref 0–100)
NONHDLC SERPL-MCNC: 104 MG/DL
TRIGL SERPL-MCNC: 212 MG/DL

## 2023-09-15 PROCEDURE — 36415 COLL VENOUS BLD VENIPUNCTURE: CPT

## 2023-09-15 PROCEDURE — 84402 ASSAY OF FREE TESTOSTERONE: CPT

## 2023-09-15 PROCEDURE — 84403 ASSAY OF TOTAL TESTOSTERONE: CPT

## 2023-09-15 PROCEDURE — 83001 ASSAY OF GONADOTROPIN (FSH): CPT

## 2023-09-15 PROCEDURE — 80061 LIPID PANEL: CPT

## 2023-09-15 PROCEDURE — 83036 HEMOGLOBIN GLYCOSYLATED A1C: CPT

## 2023-09-16 LAB
TESTOST FREE SERPL-MCNC: 9 PG/ML (ref 6.8–21.5)
TESTOST SERPL-MCNC: 376 NG/DL (ref 264–916)

## 2023-12-10 ENCOUNTER — OFFICE VISIT (OUTPATIENT)
Dept: URGENT CARE | Age: 48
End: 2023-12-10
Payer: COMMERCIAL

## 2023-12-10 VITALS
BODY MASS INDEX: 35.45 KG/M2 | TEMPERATURE: 98.6 F | OXYGEN SATURATION: 96 % | DIASTOLIC BLOOD PRESSURE: 88 MMHG | SYSTOLIC BLOOD PRESSURE: 139 MMHG | HEART RATE: 102 BPM | RESPIRATION RATE: 18 BRPM | WEIGHT: 213 LBS

## 2023-12-10 DIAGNOSIS — U07.1 COVID: Primary | ICD-10-CM

## 2023-12-10 LAB
SARS-COV-2 AG UPPER RESP QL IA: POSITIVE
VALID CONTROL: ABNORMAL

## 2023-12-10 PROCEDURE — 99213 OFFICE O/P EST LOW 20 MIN: CPT | Performed by: PHYSICIAN ASSISTANT

## 2023-12-10 PROCEDURE — 87811 SARS-COV-2 COVID19 W/OPTIC: CPT

## 2023-12-10 RX ORDER — BENZONATATE 100 MG/1
100 CAPSULE ORAL 3 TIMES DAILY PRN
Qty: 20 CAPSULE | Refills: 0 | Status: SHIPPED | OUTPATIENT
Start: 2023-12-10 | End: 2023-12-10

## 2023-12-10 RX ORDER — NIRMATRELVIR AND RITONAVIR 300-100 MG
3 KIT ORAL 2 TIMES DAILY
Qty: 30 TABLET | Refills: 0 | Status: SHIPPED | OUTPATIENT
Start: 2023-12-10 | End: 2023-12-15

## 2023-12-10 RX ORDER — NIRMATRELVIR AND RITONAVIR 300-100 MG
3 KIT ORAL 2 TIMES DAILY
Qty: 30 TABLET | Refills: 0 | Status: SHIPPED | OUTPATIENT
Start: 2023-12-10 | End: 2023-12-10

## 2023-12-10 RX ORDER — BENZONATATE 100 MG/1
100 CAPSULE ORAL 3 TIMES DAILY PRN
Qty: 20 CAPSULE | Refills: 0 | Status: SHIPPED | OUTPATIENT
Start: 2023-12-10

## 2023-12-10 NOTE — LETTER
December 10, 2023     Patient: Raul Schulte   YOB: 1975   Date of Visit: 12/10/2023       To Whom It May Concern: It is my medical opinion that Herrera Flatness may return to work on 12/12/2023, and  then masking x 5 more days  . If you have any questions or concerns, please don't hesitate to call.          Sincerely,        FRIDA MERLOS    CC: No Recipients

## 2023-12-10 NOTE — PROGRESS NOTES
[facility]        NAME: Mari Rodríguez is a 57 y.o. male  : 1975    MRN: [id number]  DATE: December 10, 2023  TIME: 2:15 PM    /88   Pulse 102   Temp 98.6 °F (37 °C) (Tympanic)   Resp 18   Wt 96.6 kg (213 lb)   SpO2 96%   BMI 35.45 kg/m²     Assessment and Plan   COVID [U07.1]  1. COVID  Poct Covid 19 Rapid Antigen Test    benzonatate (TESSALON PERLES) 100 mg capsule    nirmatrelvir & ritonavir (Paxlovid, 300/100,) tablet therapy pack    DISCONTINUED: nirmatrelvir & ritonavir (Paxlovid, 300/100,) tablet therapy pack            Patient Instructions       Follow up with PCP in 3-5 days. Proceed to  ER if symptoms worsen. Chief Complaint     Chief Complaint   Patient presents with    [id number]     Patient tested self at home and was positive. Wanted to come and get assessed and tested to confirm. History of Present Illness       Pt with nasal congestion sore throat congestion body aches x 4 days, home covid test + this morning     Fever - 9 weeks to 74 years   This is a new problem. The current episode started today. The maximum temperature noted was 101 to 101.9 F. Associated symptoms include congestion, coughing, muscle aches, sleepiness and a sore throat. Pertinent negatives include no abdominal pain, chest pain, diarrhea, ear pain, headaches, nausea, rash, urinary pain, vomiting or wheezing. Review of Systems   Review of Systems   Constitutional:  Positive for fever. HENT:  Positive for congestion and sore throat. Negative for ear pain. Eyes: Negative. Respiratory:  Positive for cough. Negative for wheezing. Cardiovascular: Negative. Negative for chest pain. Gastrointestinal: Negative. Negative for abdominal pain, diarrhea, nausea and vomiting. Endocrine: Negative. Genitourinary: Negative. Negative for dysuria. Musculoskeletal: Negative. Skin: Negative. Negative for rash. Allergic/Immunologic: Negative. Neurological: Negative.   Negative for headaches. Hematological: Negative. Psychiatric/Behavioral: Negative. All other systems reviewed and are negative.         Current Medications       Current Outpatient Medications:     benzonatate (TESSALON PERLES) 100 mg capsule, Take 1 capsule (100 mg total) by mouth 3 (three) times a day as needed for cough, Disp: 20 capsule, Rfl: 0    nirmatrelvir & ritonavir (Paxlovid, 300/100,) tablet therapy pack, Take 3 tablets by mouth 2 (two) times a day for 5 days Take 2 nirmatrelvir tablets + 1 ritonavir tablet together per dose, Disp: 30 tablet, Rfl: 0    albuterol (Ventolin HFA) 90 mcg/act inhaler, Inhale 2 puffs every 6 (six) hours as needed for wheezing (Patient not taking: Reported on 5/4/2023), Disp: 18 g, Rfl: 3    aspirin 81 mg chewable tablet, Chew 81 mg daily (Patient not taking: Reported on 5/4/2023), Disp: , Rfl:     atorvastatin (LIPITOR) 40 mg tablet, Take 1 tablet (40 mg total) by mouth daily, Disp: 90 tablet, Rfl: 3    losartan (COZAAR) 50 mg tablet, Take 1 tablet (50 mg total) by mouth daily, Disp: 90 tablet, Rfl: 3    metoprolol succinate (TOPROL-XL) 50 mg 24 hr tablet, Take 1 tablet (50 mg total) by mouth daily, Disp: 90 tablet, Rfl: 3    Current Allergies     Allergies as of 12/10/2023    (No Known Allergies)            The following portions of the patient's history were reviewed and updated as appropriate: allergies, current medications, past family history, past medical history, past social history, past surgical history and problem list.     Past Medical History:   Diagnosis Date    Asthma     CPAP (continuous positive airway pressure) dependence     Hyperlipidemia     Hypertension     Nasal disorder     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 patient     Sleep apnea        Past Surgical History:   Procedure Laterality Date    CARDIAC CATHETERIZATION      HERNIA REPAIR Right     inguinal    NASAL DILATION      MD CORRJ HALLUX VALGUS W/SESMDC W/PROX PHLNX OSTEOT Right 04/21/2021    Procedure: Catherine Perico, And tibial sesamoid excision;  Surgeon: Enrique Brown MD;  [facility];  Service: Orthopedics    IN LAPAROSCOPY SURG RPR INITIAL INGUINAL HERNIA Left 4/19/2023    Procedure: REPAIR HERNIA INGUINAL W/ ROBOT, LYSIS OF ADHESION;  Surgeon: Pal Jay MD;  Location: [facility];  Service: General       History reviewed. No pertinent family history. Medications have been verified. Objective   /88   Pulse 102   Temp 98.6 °F (37 °C) (Tympanic)   Resp 18   Wt 96.6 kg (213 lb)   SpO2 96%   BMI 35.45 kg/m²        Physical Exam     Physical Exam  Vitals and nursing note reviewed. Constitutional:       Appearance: Normal appearance. He is normal weight. Comments: Pt asking for paxlovid rx      HENT:      Head: Normocephalic and atraumatic. Right Ear: Tympanic membrane, ear canal and external ear normal.      Left Ear: Tympanic membrane, ear canal and external ear normal.      Nose: Congestion and rhinorrhea present. Comments: clear  Eyes:      Extraocular Movements: Extraocular movements intact. Conjunctiva/sclera: Conjunctivae normal.      Pupils: Pupils are equal, round, and reactive to light. Cardiovascular:      Rate and Rhythm: Normal rate and regular rhythm. Pulses: Normal pulses. Heart sounds: Normal heart sounds. Pulmonary:      Effort: Pulmonary effort is normal.      Breath sounds: Normal breath sounds. Abdominal:      General: Abdomen is flat. Bowel sounds are normal.      Palpations: Abdomen is soft. Musculoskeletal:         General: Normal range of motion. Cervical back: Normal range of motion and neck supple. Neurological:      General: No focal deficit present. Mental Status: He is alert and oriented to person, place, and time.

## 2023-12-10 NOTE — PROGRESS NOTES
Accessed patient's chart for information to call and cancel prescriptions that were sent to pharmacy as requested by provider.

## 2023-12-26 NOTE — PROGRESS NOTES
Assessment/Plan:   Miguelangel Marr is a 48 y.o.male who is here for a: First Screening Colonoscopy.       Plan: Colonoscopy for: Screening for Colon Cancer      Previous Colonoscopy and  Location of polyps if any:   First screening colonoscopy       Preoperative Clearance: None      ____________________________________________________    HPI:  Miguelangel Marr is a 48 y.o.male who was referred for evaluation of    First Screening.      Currently:     Symptoms include:  no abdominal pain, change in bowel habits, or black or bloody stools.      Family history of colon cancer: None reported; father is diagnosed with Crohns       Anticoagulation: none    Recent A1C: 6.0 three months ago      LABS:      Lab Results   Component Value Date    WBC 8.05 04/06/2023    HGB 13.7 04/06/2023    HCT 40.7 04/06/2023    MCV 90 04/06/2023     04/06/2023     Lab Results   Component Value Date     12/15/2015    K 3.9 04/06/2023     04/06/2023    CO2 26 04/06/2023    ANIONGAP 4 12/15/2015    BUN 19 04/06/2023    CREATININE 0.99 04/06/2023    GLUCOSE 91 12/15/2015    GLUF 94 04/06/2023    CALCIUM 8.8 04/06/2023    AST 16 04/06/2023    ALT 15 04/06/2023    ALKPHOS 65 04/06/2023    PROT 7.1 12/15/2015    BILITOT 0.50 12/15/2015    EGFR 90 04/06/2023     Lab Results   Component Value Date    HGBA1C 6.0 (H) 09/15/2023     Lab Results   Component Value Date    INR 0.99 12/09/2019    PROTIME 12.5 12/09/2019         No orders to display         Review of Systems   Constitutional:  Negative for chills, diaphoresis, fever and unexpected weight change.   Respiratory:  Negative for chest tightness and shortness of breath.    Cardiovascular:  Negative for chest pain, palpitations and leg swelling.   Gastrointestinal:  Negative for abdominal pain, anal bleeding, blood in stool, constipation, diarrhea, nausea, rectal pain and vomiting.   Genitourinary:  Negative for difficulty urinating and frequency.   Skin:  Negative for color  change, rash and wound.   Neurological:  Negative for weakness and numbness.   Hematological:  Does not bruise/bleed easily.   Psychiatric/Behavioral:  Negative for confusion. The patient is not nervous/anxious.            Imaging: No new pertinent imaging studies.         Patient Active Problem List   Diagnosis    Mild persistent asthma without complication    MANOJ (obstructive sleep apnea)    Chronic cough    Class 2 obesity due to excess calories with body mass index (BMI) of 36.0 to 36.9 in adult    Essential (primary) hypertension    Hallux interphalangeus, acquired, right    CPAP (continuous positive airway pressure) dependence    Hyperlipidemia         Allergies:  Patient has no known allergies.      Current Outpatient Medications:     albuterol (Ventolin HFA) 90 mcg/act inhaler, Inhale 2 puffs every 6 (six) hours as needed for wheezing, Disp: 18 g, Rfl: 3    aspirin 81 mg chewable tablet, Chew 81 mg daily, Disp: , Rfl:     atorvastatin (LIPITOR) 40 mg tablet, Take 1 tablet (40 mg total) by mouth daily, Disp: 90 tablet, Rfl: 3    losartan (COZAAR) 50 mg tablet, Take 1 tablet (50 mg total) by mouth daily, Disp: 90 tablet, Rfl: 3    metoprolol succinate (TOPROL-XL) 50 mg 24 hr tablet, Take 1 tablet (50 mg total) by mouth daily, Disp: 90 tablet, Rfl: 3    benzonatate (TESSALON PERLES) 100 mg capsule, Take 1 capsule (100 mg total) by mouth 3 (three) times a day as needed for cough (Patient not taking: Reported on 1/4/2024), Disp: 20 capsule, Rfl: 0    Past Medical History:   Diagnosis Date    Asthma     CPAP (continuous positive airway pressure) dependence     Hyperlipidemia     Hypertension     Nasal disorder     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 patient     Sleep apnea        Past Surgical History:   Procedure Laterality Date    CARDIAC CATHETERIZATION      HERNIA REPAIR Right     inguinal    NASAL DILATION      UT CORRJ HLX VLGS BNCTY SESMDC PROX PHLX OSTEOT Right 04/21/2021    Procedure: BUNIONECTOMY CAILIN, And  tibial sesamoid excision;  Surgeon: James R Lachman, MD;  Location: [facility];  Service: Orthopedics    CT LAPAROSCOPY SURG RPR INITIAL INGUINAL HERNIA Left 4/19/2023    Procedure: REPAIR HERNIA INGUINAL W/ ROBOT, LYSIS OF ADHESION;  Surgeon: Sana Vogel MD;  [facility];  Service: General       No family history on file.    Social History     Socioeconomic History    Marital status: Single     Spouse name: None    Number of children: None    Years of education: None    Highest education level: None   Occupational History    None   Tobacco Use    Smoking status: Never     Passive exposure: Never    Smokeless tobacco: Never   Vaping Use    Vaping status: Never Used   Substance and Sexual Activity    Alcohol use: Never    Drug use: Never    Sexual activity: Yes     Partners: Female   Other Topics Concern    None   Social History Narrative    None     Social Determinants of Health     Financial Resource Strain: Not on file   Food Insecurity: Not on file   Transportation Needs: Not on file   Physical Activity: Not on file   Stress: Not on file   Social Connections: Not on file   Intimate Partner Violence: Not on file   Housing Stability: Not on file       Exam:   Vitals:    01/04/24 0935   BP: 128/82   Pulse: 79   Resp: 16   Temp: (!) 96.8 °F (36 °C)   SpO2: 97%           ______________________________________________________    Physical Exam  Vitals and nursing note reviewed. Exam conducted with a chaperone present.   Constitutional:       General: He is not in acute distress.     Appearance: Normal appearance. He is normal weight. He is not ill-appearing, toxic-appearing or diaphoretic.   HENT:      Head: Normocephalic and atraumatic.   Cardiovascular:      Rate and Rhythm: Normal rate and regular rhythm.      Pulses: Normal pulses.      Heart sounds: Normal heart sounds. No murmur heard.     No gallop.   Pulmonary:      Effort: Pulmonary effort is normal.      Breath sounds: Normal breath sounds.    Abdominal:      General: Abdomen is flat. There is no distension.      Palpations: Abdomen is soft. There is no mass.      Tenderness: There is no abdominal tenderness. There is no guarding or rebound.      Hernia: No hernia is present.   Musculoskeletal:         General: No swelling or tenderness.   Lymphadenopathy:      Cervical: No cervical adenopathy.   Skin:     General: Skin is warm and dry.   Neurological:      General: No focal deficit present.      Mental Status: He is alert and oriented to person, place, and time. Mental status is at baseline.   Psychiatric:         Mood and Affect: Mood normal.         Behavior: Behavior normal.         Thought Content: Thought content normal.         Judgment: Judgment normal.               Signature:   Betty Quinteros PA-C  Date: 1/4/2024 Time: 9:37 AM                                                                                                                                         no

## 2023-12-26 NOTE — H&P (VIEW-ONLY)
Assessment/Plan:   Miguelangel Marr is a 48 y.o.male who is here for a: First Screening Colonoscopy.       Plan: Colonoscopy for: Screening for Colon Cancer      Previous Colonoscopy and  Location of polyps if any:   First screening colonoscopy       Preoperative Clearance: None      ____________________________________________________    HPI:  Miguelangel Marr is a 48 y.o.male who was referred for evaluation of    First Screening.      Currently:     Symptoms include:  no abdominal pain, change in bowel habits, or black or bloody stools.      Family history of colon cancer: None reported; father is diagnosed with Crohns       Anticoagulation: none    Recent A1C: 6.0 three months ago      LABS:      Lab Results   Component Value Date    WBC 8.05 04/06/2023    HGB 13.7 04/06/2023    HCT 40.7 04/06/2023    MCV 90 04/06/2023     04/06/2023     Lab Results   Component Value Date     12/15/2015    K 3.9 04/06/2023     04/06/2023    CO2 26 04/06/2023    ANIONGAP 4 12/15/2015    BUN 19 04/06/2023    CREATININE 0.99 04/06/2023    GLUCOSE 91 12/15/2015    GLUF 94 04/06/2023    CALCIUM 8.8 04/06/2023    AST 16 04/06/2023    ALT 15 04/06/2023    ALKPHOS 65 04/06/2023    PROT 7.1 12/15/2015    BILITOT 0.50 12/15/2015    EGFR 90 04/06/2023     Lab Results   Component Value Date    HGBA1C 6.0 (H) 09/15/2023     Lab Results   Component Value Date    INR 0.99 12/09/2019    PROTIME 12.5 12/09/2019         No orders to display         Review of Systems   Constitutional:  Negative for chills, diaphoresis, fever and unexpected weight change.   Respiratory:  Negative for chest tightness and shortness of breath.    Cardiovascular:  Negative for chest pain, palpitations and leg swelling.   Gastrointestinal:  Negative for abdominal pain, anal bleeding, blood in stool, constipation, diarrhea, nausea, rectal pain and vomiting.   Genitourinary:  Negative for difficulty urinating and frequency.   Skin:  Negative for color  change, rash and wound.   Neurological:  Negative for weakness and numbness.   Hematological:  Does not bruise/bleed easily.   Psychiatric/Behavioral:  Negative for confusion. The patient is not nervous/anxious.            Imaging: No new pertinent imaging studies.         Patient Active Problem List   Diagnosis    Mild persistent asthma without complication    MANOJ (obstructive sleep apnea)    Chronic cough    Class 2 obesity due to excess calories with body mass index (BMI) of 36.0 to 36.9 in adult    Essential (primary) hypertension    Hallux interphalangeus, acquired, right    CPAP (continuous positive airway pressure) dependence    Hyperlipidemia         Allergies:  Patient has no known allergies.      Current Outpatient Medications:     albuterol (Ventolin HFA) 90 mcg/act inhaler, Inhale 2 puffs every 6 (six) hours as needed for wheezing, Disp: 18 g, Rfl: 3    aspirin 81 mg chewable tablet, Chew 81 mg daily, Disp: , Rfl:     atorvastatin (LIPITOR) 40 mg tablet, Take 1 tablet (40 mg total) by mouth daily, Disp: 90 tablet, Rfl: 3    losartan (COZAAR) 50 mg tablet, Take 1 tablet (50 mg total) by mouth daily, Disp: 90 tablet, Rfl: 3    metoprolol succinate (TOPROL-XL) 50 mg 24 hr tablet, Take 1 tablet (50 mg total) by mouth daily, Disp: 90 tablet, Rfl: 3    benzonatate (TESSALON PERLES) 100 mg capsule, Take 1 capsule (100 mg total) by mouth 3 (three) times a day as needed for cough (Patient not taking: Reported on 1/4/2024), Disp: 20 capsule, Rfl: 0    Past Medical History:   Diagnosis Date    Asthma     CPAP (continuous positive airway pressure) dependence     Hyperlipidemia     Hypertension     Nasal disorder     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 patient     Sleep apnea        Past Surgical History:   Procedure Laterality Date    CARDIAC CATHETERIZATION      HERNIA REPAIR Right     inguinal    NASAL DILATION      CT CORRJ HLX VLGS BNCTY SESMDC PROX PHLX OSTEOT Right 04/21/2021    Procedure: BUNIONECTOMY CAILIN, And  tibial sesamoid excision;  Surgeon: James R Lachman, MD;  [facility];  Service: Orthopedics    ME LAPAROSCOPY SURG RPR INITIAL INGUINAL HERNIA Left 4/19/2023    Procedure: REPAIR HERNIA INGUINAL W/ ROBOT, LYSIS OF ADHESION;  Surgeon: Sana Vogel MD;  [facility];  Service: General       No family history on file.    Social History     Socioeconomic History    Marital status: Single     Spouse name: None    Number of children: None    Years of education: None    Highest education level: None   Occupational History    None   Tobacco Use    Smoking status: Never     Passive exposure: Never    Smokeless tobacco: Never   Vaping Use    Vaping status: Never Used   Substance and Sexual Activity    Alcohol use: Never    Drug use: Never    Sexual activity: Yes     Partners: Female   Other Topics Concern    None   Social History Narrative    None     Social Determinants of Health     Financial Resource Strain: Not on file   Food Insecurity: Not on file   Transportation Needs: Not on file   Physical Activity: Not on file   Stress: Not on file   Social Connections: Not on file   Intimate Partner Violence: Not on file   Housing Stability: Not on file       Exam:   Vitals:    01/04/24 0935   BP: 128/82   Pulse: 79   Resp: 16   Temp: (!) 96.8 °F (36 °C)   SpO2: 97%           ______________________________________________________    Physical Exam  Vitals and nursing note reviewed. Exam conducted with a chaperone present.   Constitutional:       General: He is not in acute distress.     Appearance: Normal appearance. He is normal weight. He is not ill-appearing, toxic-appearing or diaphoretic.   HENT:      Head: Normocephalic and atraumatic.   Cardiovascular:      Rate and Rhythm: Normal rate and regular rhythm.      Pulses: Normal pulses.      Heart sounds: Normal heart sounds. No murmur heard.     No gallop.   Pulmonary:      Effort: Pulmonary effort is normal.      Breath sounds: Normal breath sounds.    Abdominal:      General: Abdomen is flat. There is no distension.      Palpations: Abdomen is soft. There is no mass.      Tenderness: There is no abdominal tenderness. There is no guarding or rebound.      Hernia: No hernia is present.   Musculoskeletal:         General: No swelling or tenderness.   Lymphadenopathy:      Cervical: No cervical adenopathy.   Skin:     General: Skin is warm and dry.   Neurological:      General: No focal deficit present.      Mental Status: He is alert and oriented to person, place, and time. Mental status is at baseline.   Psychiatric:         Mood and Affect: Mood normal.         Behavior: Behavior normal.         Thought Content: Thought content normal.         Judgment: Judgment normal.               Signature:   Betty Quinteros PA-C  Date: 1/4/2024 Time: 9:37 AM

## 2024-01-04 ENCOUNTER — OFFICE VISIT (OUTPATIENT)
Dept: SURGERY | Facility: CLINIC | Age: 49
End: 2024-01-04

## 2024-01-04 VITALS
BODY MASS INDEX: 36.15 KG/M2 | DIASTOLIC BLOOD PRESSURE: 82 MMHG | WEIGHT: 217 LBS | HEIGHT: 65 IN | OXYGEN SATURATION: 97 % | SYSTOLIC BLOOD PRESSURE: 128 MMHG | HEART RATE: 79 BPM | RESPIRATION RATE: 16 BRPM | TEMPERATURE: 96.8 F

## 2024-01-04 DIAGNOSIS — Z12.11 ENCOUNTER FOR SCREENING COLONOSCOPY: Primary | ICD-10-CM

## 2024-01-04 PROCEDURE — NC001 PR NO CHARGE: Performed by: PHYSICIAN ASSISTANT

## 2024-01-29 RX ORDER — SODIUM CHLORIDE 9 MG/ML
125 INJECTION, SOLUTION INTRAVENOUS CONTINUOUS
Status: CANCELLED | OUTPATIENT
Start: 2024-01-29

## 2024-01-30 ENCOUNTER — HOSPITAL ENCOUNTER (OUTPATIENT)
Dept: GASTROENTEROLOGY | Facility: HOSPITAL | Age: 49
Setting detail: OUTPATIENT SURGERY
Discharge: HOME | End: 2024-01-30
Attending: SURGERY
Payer: COMMERCIAL

## 2024-01-30 ENCOUNTER — ANESTHESIA (OUTPATIENT)
Dept: GASTROENTEROLOGY | Facility: HOSPITAL | Age: 49
End: 2024-01-30

## 2024-01-30 ENCOUNTER — ANESTHESIA EVENT (OUTPATIENT)
Dept: GASTROENTEROLOGY | Facility: HOSPITAL | Age: 49
End: 2024-01-30

## 2024-01-30 VITALS
SYSTOLIC BLOOD PRESSURE: 105 MMHG | HEART RATE: 69 BPM | HEIGHT: 65 IN | OXYGEN SATURATION: 96 % | RESPIRATION RATE: 20 BRPM | DIASTOLIC BLOOD PRESSURE: 67 MMHG | BODY MASS INDEX: 34.99 KG/M2 | WEIGHT: 210 LBS | TEMPERATURE: 98.1 F

## 2024-01-30 DIAGNOSIS — Z12.11 SCREEN FOR COLON CANCER: ICD-10-CM

## 2024-01-30 PROBLEM — G47.30 SLEEP APNEA: Status: ACTIVE | Noted: 2020-11-18

## 2024-01-30 PROBLEM — E66.09 CLASS 2 OBESITY DUE TO EXCESS CALORIES WITH BODY MASS INDEX (BMI) OF 36.0 TO 36.9 IN ADULT: Status: RESOLVED | Noted: 2020-11-18 | Resolved: 2024-01-30

## 2024-01-30 PROBLEM — E66.812 CLASS 2 OBESITY DUE TO EXCESS CALORIES WITH BODY MASS INDEX (BMI) OF 36.0 TO 36.9 IN ADULT: Status: RESOLVED | Noted: 2020-11-18 | Resolved: 2024-01-30

## 2024-01-30 PROBLEM — E66.811 CLASS 1 OBESITY DUE TO EXCESS CALORIES WITH SERIOUS COMORBIDITY AND BODY MASS INDEX (BMI) OF 34.0 TO 34.9 IN ADULT: Status: ACTIVE | Noted: 2020-11-18

## 2024-01-30 PROBLEM — J45.909 ASTHMA: Status: ACTIVE | Noted: 2024-01-30

## 2024-01-30 PROCEDURE — 88305 TISSUE EXAM BY PATHOLOGIST: CPT | Performed by: PATHOLOGY

## 2024-01-30 PROCEDURE — 45378 DIAGNOSTIC COLONOSCOPY: CPT | Performed by: SURGERY

## 2024-01-30 RX ORDER — SODIUM CHLORIDE 9 MG/ML
125 INJECTION, SOLUTION INTRAVENOUS CONTINUOUS
Status: DISCONTINUED | OUTPATIENT
Start: 2024-01-30 | End: 2024-02-03 | Stop reason: HOSPADM

## 2024-01-30 RX ORDER — PROPOFOL 10 MG/ML
INJECTION, EMULSION INTRAVENOUS CONTINUOUS PRN
Status: DISCONTINUED | OUTPATIENT
Start: 2024-01-30 | End: 2024-01-30

## 2024-01-30 RX ORDER — PROPOFOL 10 MG/ML
INJECTION, EMULSION INTRAVENOUS AS NEEDED
Status: DISCONTINUED | OUTPATIENT
Start: 2024-01-30 | End: 2024-01-30

## 2024-01-30 RX ORDER — LIDOCAINE HYDROCHLORIDE 10 MG/ML
INJECTION, SOLUTION EPIDURAL; INFILTRATION; INTRACAUDAL; PERINEURAL AS NEEDED
Status: DISCONTINUED | OUTPATIENT
Start: 2024-01-30 | End: 2024-01-30

## 2024-01-30 RX ADMIN — LIDOCAINE HYDROCHLORIDE 100 MG: 10 INJECTION, SOLUTION EPIDURAL; INFILTRATION; INTRACAUDAL; PERINEURAL at 07:40

## 2024-01-30 RX ADMIN — PROPOFOL 120 MCG/KG/MIN: 10 INJECTION, EMULSION INTRAVENOUS at 07:39

## 2024-01-30 RX ADMIN — SODIUM CHLORIDE 125 ML/HR: 0.9 INJECTION, SOLUTION INTRAVENOUS at 07:19

## 2024-01-30 RX ADMIN — PROPOFOL 50 MG: 10 INJECTION, EMULSION INTRAVENOUS at 07:40

## 2024-01-30 RX ADMIN — PROPOFOL 100 MG: 10 INJECTION, EMULSION INTRAVENOUS at 07:39

## 2024-01-30 NOTE — INTERVAL H&P NOTE
H&P reviewed. After examining the patient I find no changes in the patients condition since the H&P had been written.    Vitals:    01/30/24 0702   BP: 128/77   Pulse: 87   Resp: 16   Temp: 98.1 °F (36.7 °C)   SpO2: 98%   This is a delayed entry of the note was signed and consent obtained prior to the procedure.

## 2024-01-30 NOTE — ANESTHESIA POSTPROCEDURE EVALUATION
"Post-Op Assessment Note    CV Status:  Stable    Pain management: adequate       Mental Status:  Alert and awake   Hydration Status:  Euvolemic   PONV Controlled:  Controlled   Airway Patency:  Patent     Post Op Vitals Reviewed: Yes    No anethesia notable event occurred.    Staff: Anesthesiologist, CRNA               BP      Temp      Pulse     Resp      SpO2      /67   Pulse 69   Temp 98.1 °F (36.7 °C) (Temporal)   Resp 20   Ht 5' 5\" (1.651 m)   Wt 95.3 kg (210 lb)   SpO2 96%   BMI 34.95 kg/m²   /67   Pulse 69   Temp 98.1 °F (36.7 °C) (Temporal)   Resp 20   Ht 5' 5\" (1.651 m)   Wt 95.3 kg (210 lb)   SpO2 96%   BMI 34.95 kg/m² /67   Pulse 69   Temp 98.1 °F (36.7 °C) (Temporal)   Resp 20   Ht 5' 5\" (1.651 m)   Wt 95.3 kg (210 lb)   SpO2 96%   BMI 34.95 kg/m²     "

## 2024-01-30 NOTE — ANESTHESIA PREPROCEDURE EVALUATION
Procedure:  COLONOSCOPY    Relevant Problems   CARDIO   (+) Essential (primary) hypertension   (+) Hyperlipidemia      PULMONARY   (+) Asthma   (+) Mild persistent asthma without complication   (+) Sleep apnea      Other   (+) CPAP (continuous positive airway pressure) dependence   (+) Chronic cough        Physical Exam    Airway    Mallampati score: II  TM Distance: >3 FB  Neck ROM: full     Dental   No notable dental hx     Cardiovascular  Rhythm: regular, Rate: normal, Cardiovascular exam normal    Pulmonary  Pulmonary exam normal Breath sounds clear to auscultation    Other Findings        Anesthesia Plan  ASA Score- 2     Anesthesia Type- IV sedation with anesthesia with ASA Monitors.         Additional Monitors:     Airway Plan:     Comment: GA prn.       Plan Factors-    Chart reviewed.    Patient summary reviewed.    Patient is not a current smoker.  Patient instructed to abstain from smoking on day of procedure. Patient did not smoke on day of surgery.    There is medical exclusion for perioperative obstructive sleep apnea risk education.        Induction-     Postoperative Plan-     Informed Consent- Anesthetic plan and risks discussed with patient (Massiel NELSON)).  I personally reviewed this patient with the CRNA. Discussed and agreed on the Anesthesia Plan with the CRNA..

## 2024-01-30 NOTE — DISCHARGE INSTR - AVS FIRST PAGE
[facility]  Endoscopy Post-Operative Instructions  Dr. Sana Vogel MD, FACS    Procedure: Colonoscopy    Findings:  Diverticulosis and Colon Polyp(s)    Follow-Up: You will need a repeat Endoscopy in (generally) 3- 5 years.     Will await final pathology report for final determination of number of years until your follow up endoscopy, if you had polyps on this exam.  Different types of polyps require different lengths of follow up surveillance. Please call our office or your primary doctor's office if you have any questions, once the report is returned.        You should have an endoscopy sooner than recommended if you have any symptoms of bleeding or change in stools or other concerns.      You will receive a call from our office with your results, in addition to the the preliminary results you received today.      You will usually receive a follow-up letter from our office in 1-2 weeks.  Call the office if you do not hear from us. You are welcome to also schedule an office visit if desired to discuss the results further.     It is your responsibility to contact our office for results in 1- 2 weeks if you do not hear from us.    If a follow up endoscopy is needed, you are responsible for arranging that follow up appointment at the appropriate time.  The office may or may not issue a reminder at that future time.  Please take responsibility for your own follow up healthcare.      Diet: Eat a light snack first, and then resume your previous diet.    Call the office if you have unusual fevers, chills, nausea or vomiting or abdominal pain.  Report to the emergency room with these are severe in nature.      Activity: Do not drive a car, operate machinery, or sign legal documents for 24 hours after your procedure. Normal activity may be resumed on the day following the procedure.     Call the office at [phone] for any of the following: Severe abdominal pain, significant rectal bleeding,  chills, or fever above 100°, new onset of persistent cough or persistent vomiting.     [facility]  [address], [address]  [place] [address]  Phone: [phone]

## 2024-01-31 ENCOUNTER — TELEPHONE (OUTPATIENT)
Dept: SURGERY | Facility: CLINIC | Age: 49
End: 2024-01-31

## 2024-01-31 NOTE — TELEPHONE ENCOUNTER
Colonoscopy follow up call:    Left message for patient to check in on how he was feeling. Any nausea diarrhea fever and did he have a normal bowel movement.

## 2024-02-01 ENCOUNTER — TELEPHONE (OUTPATIENT)
Dept: SURGERY | Facility: CLINIC | Age: 49
End: 2024-02-01

## 2024-02-01 PROCEDURE — 88305 TISSUE EXAM BY PATHOLOGIST: CPT | Performed by: PATHOLOGY

## 2024-02-01 NOTE — TELEPHONE ENCOUNTER
----- Message from Betty Quinteros PA-C sent at 2/1/2024 12:38 PM EST -----  Please let him know his colonoscopy path shows three tubular adenomas he can return in 5 years.

## 2024-03-01 ENCOUNTER — APPOINTMENT (OUTPATIENT)
Dept: LAB | Age: 49
End: 2024-03-01
Payer: COMMERCIAL

## 2024-03-01 DIAGNOSIS — E78.2 MIXED HYPERLIPIDEMIA: ICD-10-CM

## 2024-03-01 DIAGNOSIS — Z00.00 ROUTINE GENERAL MEDICAL EXAMINATION AT A HEALTH CARE FACILITY: ICD-10-CM

## 2024-03-01 DIAGNOSIS — N40.0 BENIGN PROSTATIC HYPERPLASIA, UNSPECIFIED WHETHER LOWER URINARY TRACT SYMPTOMS PRESENT: ICD-10-CM

## 2024-03-01 DIAGNOSIS — I10 ESSENTIAL HYPERTENSION, MALIGNANT: ICD-10-CM

## 2024-03-01 LAB
ALBUMIN SERPL BCP-MCNC: 4.1 G/DL (ref 3.5–5)
ALP SERPL-CCNC: 67 U/L (ref 34–104)
ALT SERPL W P-5'-P-CCNC: 23 U/L (ref 7–52)
ANION GAP SERPL CALCULATED.3IONS-SCNC: 9 MMOL/L
AST SERPL W P-5'-P-CCNC: 22 U/L (ref 13–39)
BACTERIA UR QL AUTO: ABNORMAL /HPF
BASOPHILS # BLD AUTO: 0.04 THOUSANDS/ÂΜL (ref 0–0.1)
BASOPHILS NFR BLD AUTO: 1 % (ref 0–1)
BILIRUB SERPL-MCNC: 0.55 MG/DL (ref 0.2–1)
BILIRUB UR QL STRIP: NEGATIVE
BUN SERPL-MCNC: 17 MG/DL (ref 5–25)
CALCIUM SERPL-MCNC: 9.4 MG/DL (ref 8.4–10.2)
CHLORIDE SERPL-SCNC: 105 MMOL/L (ref 96–108)
CHOLEST SERPL-MCNC: 116 MG/DL
CLARITY UR: CLEAR
CO2 SERPL-SCNC: 29 MMOL/L (ref 21–32)
COLOR UR: ABNORMAL
CREAT SERPL-MCNC: 1.04 MG/DL (ref 0.6–1.3)
EOSINOPHIL # BLD AUTO: 0.7 THOUSAND/ÂΜL (ref 0–0.61)
EOSINOPHIL NFR BLD AUTO: 9 % (ref 0–6)
ERYTHROCYTE [DISTWIDTH] IN BLOOD BY AUTOMATED COUNT: 12 % (ref 11.6–15.1)
ERYTHROCYTE [SEDIMENTATION RATE] IN BLOOD: 4 MM/HOUR (ref 0–14)
GFR SERPL CREATININE-BSD FRML MDRD: 85 ML/MIN/1.73SQ M
GLUCOSE P FAST SERPL-MCNC: 96 MG/DL (ref 65–99)
GLUCOSE UR STRIP-MCNC: NEGATIVE MG/DL
HCT VFR BLD AUTO: 41.5 % (ref 36.5–49.3)
HDLC SERPL-MCNC: 32 MG/DL
HGB BLD-MCNC: 14.1 G/DL (ref 12–17)
HGB UR QL STRIP.AUTO: NEGATIVE
HYALINE CASTS #/AREA URNS LPF: ABNORMAL /LPF
IMM GRANULOCYTES # BLD AUTO: 0.02 THOUSAND/UL (ref 0–0.2)
IMM GRANULOCYTES NFR BLD AUTO: 0 % (ref 0–2)
KETONES UR STRIP-MCNC: NEGATIVE MG/DL
LDH SERPL-CCNC: 205 U/L (ref 140–271)
LDLC SERPL CALC-MCNC: 65 MG/DL (ref 0–100)
LEUKOCYTE ESTERASE UR QL STRIP: NEGATIVE
LYMPHOCYTES # BLD AUTO: 2.5 THOUSANDS/ÂΜL (ref 0.6–4.47)
LYMPHOCYTES NFR BLD AUTO: 31 % (ref 14–44)
MCH RBC QN AUTO: 31 PG (ref 26.8–34.3)
MCHC RBC AUTO-ENTMCNC: 34 G/DL (ref 31.4–37.4)
MCV RBC AUTO: 91 FL (ref 82–98)
MONOCYTES # BLD AUTO: 0.69 THOUSAND/ÂΜL (ref 0.17–1.22)
MONOCYTES NFR BLD AUTO: 8 % (ref 4–12)
MUCOUS THREADS UR QL AUTO: ABNORMAL
NEUTROPHILS # BLD AUTO: 4.23 THOUSANDS/ÂΜL (ref 1.85–7.62)
NEUTS SEG NFR BLD AUTO: 51 % (ref 43–75)
NITRITE UR QL STRIP: NEGATIVE
NON-SQ EPI CELLS URNS QL MICRO: ABNORMAL /HPF
NONHDLC SERPL-MCNC: 84 MG/DL
NRBC BLD AUTO-RTO: 0 /100 WBCS
PH UR STRIP.AUTO: 5.5 [PH]
PLATELET # BLD AUTO: 249 THOUSANDS/UL (ref 149–390)
PMV BLD AUTO: 10.9 FL (ref 8.9–12.7)
POTASSIUM SERPL-SCNC: 4.1 MMOL/L (ref 3.5–5.3)
PROT SERPL-MCNC: 6.7 G/DL (ref 6.4–8.4)
PROT UR STRIP-MCNC: ABNORMAL MG/DL
PSA SERPL-MCNC: 0.4 NG/ML (ref 0–4)
RBC # BLD AUTO: 4.55 MILLION/UL (ref 3.88–5.62)
RBC #/AREA URNS AUTO: ABNORMAL /HPF
SODIUM SERPL-SCNC: 143 MMOL/L (ref 135–147)
SP GR UR STRIP.AUTO: 1.02 (ref 1–1.03)
TESTOST SERPL-MSCNC: 298 NG/DL
TRIGL SERPL-MCNC: 93 MG/DL
TSH SERPL DL<=0.05 MIU/L-ACNC: 2.52 UIU/ML (ref 0.45–4.5)
UROBILINOGEN UR STRIP-ACNC: <2 MG/DL
WBC # BLD AUTO: 8.18 THOUSAND/UL (ref 4.31–10.16)
WBC #/AREA URNS AUTO: ABNORMAL /HPF

## 2024-03-01 PROCEDURE — 36415 COLL VENOUS BLD VENIPUNCTURE: CPT

## 2024-03-01 PROCEDURE — 84403 ASSAY OF TOTAL TESTOSTERONE: CPT

## 2024-03-01 PROCEDURE — 84443 ASSAY THYROID STIM HORMONE: CPT

## 2024-03-01 PROCEDURE — G0103 PSA SCREENING: HCPCS

## 2024-03-01 PROCEDURE — 85025 COMPLETE CBC W/AUTO DIFF WBC: CPT

## 2024-03-01 PROCEDURE — 85652 RBC SED RATE AUTOMATED: CPT

## 2024-03-01 PROCEDURE — 81001 URINALYSIS AUTO W/SCOPE: CPT

## 2024-03-01 PROCEDURE — 80061 LIPID PANEL: CPT

## 2024-03-01 PROCEDURE — 83615 LACTATE (LD) (LDH) ENZYME: CPT

## 2024-03-01 PROCEDURE — 80053 COMPREHEN METABOLIC PANEL: CPT

## 2024-06-21 ENCOUNTER — OFFICE VISIT (OUTPATIENT)
Dept: URGENT CARE | Age: 49
End: 2024-06-21
Payer: COMMERCIAL

## 2024-06-21 VITALS
HEART RATE: 71 BPM | BODY MASS INDEX: 36.78 KG/M2 | TEMPERATURE: 97.2 F | WEIGHT: 221 LBS | DIASTOLIC BLOOD PRESSURE: 86 MMHG | SYSTOLIC BLOOD PRESSURE: 145 MMHG

## 2024-06-21 DIAGNOSIS — H69.92 DISORDER OF LEFT EUSTACHIAN TUBE: Primary | ICD-10-CM

## 2024-06-21 PROCEDURE — 99213 OFFICE O/P EST LOW 20 MIN: CPT

## 2024-06-21 NOTE — PATIENT INSTRUCTIONS
Eustachian tube/ear massage as demonstrated, 2 to 3 minutes every 2-3 hours.  Afrin twice a day for 3 days then stop.  Fluticasone twice a day for 2 to 3 weeks.    Follow up with PCP in 3-5 days.  Proceed to  ER if symptoms worsen.    If tests have been performed at [facility], our office will contact you with results if changes need to be made to the care plan discussed with you at the visit.  You can review your full [facility] on [facility] My[facility].    Eustachian Tube Dysfunction   WHAT YOU NEED TO KNOW:   What is eustachian tube dysfunction (ETD)?  ETD is a condition that prevents your eustachian tubes from opening properly. It can also cause them to become blocked. Eustachian tubes connect your middle ear to the back of your nose and throat. These tubes open and allow air to flow in and out when you sneeze, swallow, or yawn.       What causes or increases my risk for ETD?  ETD may be caused by swelling or buildup of mucus in your eustachian tubes. Pressure can build if you travel in an airplane or go scuba diving. Allergies, a cold, or a sinus infection can cause mucus to build up. The following can also increase your risk:  Smoking cigarettes    GERD, chronic sinus inflammation, or a tumor in your nose or throat    An immune system disorder    Sleeping on your stomach    In children, long-term use of a bottle, going to , or a condition such as a cleft palate    What are the signs and symptoms of ETD?   Fullness or pressure in your ears    Muffled hearing, or a feeling you are hearing under water or have clogged ears    Pain in one or both ears    Ringing in your ears    Popping, crackling, or clicking feeling in your ears    Trouble keeping your balance    How is ETD diagnosed?  ETD is most common in children younger than 5 years. Adults with ETD may have had it since childhood. ETD can sometimes begin in adulthood, usually because of certain medical conditions that have developed. Your healthcare provider  will ask about your symptoms and when they began. He or she will examine your ears, your nose, and the back of your throat. He or she may also do a hearing test.  How is ETD treated?  ETD may get better on its own without any treatment. If it continues, you may need any of the following:  Swallow, yawn, or chew gum  to help open your eustachian tubes. Your healthcare provider may also recommend you blow with your mouth shut and your nostrils pinched closed.    Air pressure devices  push air into your nose and eustachian tubes to help relieve air pressure in your ear.    Treatment for allergies  such as decongestants, antihistamines, and nasal steroids may improve ETD. They may help decrease swelling of the eustachian tubes.    A myringotomy  is surgery to make a hole in your eardrum. The hole relieves pressure and lets fluid drain from your ear. A pressure equalizing (PE) tube may be used to keep the hole open and to help drain fluid.         Tuboplasty  is a procedure to widen your eustachian tubes.    When should I call my doctor?   Your symptoms do not improve or get worse.    You have a fever.    You have any hearing loss.    You have questions or concerns about your condition or care.    CARE AGREEMENT:   You have the right to help plan your care. Learn about your health condition and how it may be treated. Discuss treatment options with your healthcare providers to decide what care you want to receive. You always have the right to refuse treatment. The above information is an  only. It is not intended as medical advice for individual conditions or treatments. Talk to your doctor, nurse or pharmacist before following any medical regimen to see if it is safe and effective for you.  © Copyright [employer] 2023 Information is for End User's use only and may not be sold, redistributed or otherwise used for commercial purposes.

## 2024-06-21 NOTE — PROGRESS NOTES
[facility]        NAME: Miguelangel Marr is a 49 y.o. male  : 1975    MRN: [id number]  DATE: 2024  TIME: 10:20 AM    Assessment and Plan   Disorder of left eustachian tube [H69.92]  1. Disorder of left eustachian tube              Patient Instructions     Eustachian tube/ear massage as demonstrated, 2 to 3 minutes every 2-3 hours.  Afrin twice a day for 3 days then stop.  Fluticasone twice a day for 2 to 3 weeks.    Follow up with PCP in 3-5 days.  Proceed to  ER if symptoms worsen.    If tests have been performed at [facility], our office will contact you with results if changes need to be made to the care plan discussed with you at the visit.  [facility]    Chief Complaint   No chief complaint on file.        History of Present Illness       Patient complains of his left ear clogged.  He has tried swimmers eardrops without success.  Does complain of nasal congestion, but that is nothing new he has allergies this time of year, he did try some Benadryl without relief.  Denies any cold symptoms.    Review of Systems   Review of Systems   Constitutional: Negative.    HENT:  Positive for congestion and ear pain.    Respiratory: Negative.         Current Medications       Current Outpatient Medications:   •  albuterol (Ventolin HFA) 90 mcg/act inhaler, Inhale 2 puffs every 6 (six) hours as needed for wheezing, Disp: 18 g, Rfl: 3  •  aspirin 81 mg chewable tablet, Chew 81 mg daily, Disp: , Rfl:   •  atorvastatin (LIPITOR) 40 mg tablet, Take 1 tablet (40 mg total) by mouth daily, Disp: 90 tablet, Rfl: 3  •  benzonatate (TESSALON PERLES) 100 mg capsule, Take 1 capsule (100 mg total) by mouth 3 (three) times a day as needed for cough (Patient not taking: Reported on 2024), Disp: 20 capsule, Rfl: 0  •  losartan (COZAAR) 50 mg tablet, Take 1 tablet (50 mg total) by mouth daily, Disp: 90 tablet, Rfl: 3  •  metoprolol succinate (TOPROL-XL) 50 mg 24 hr  tablet, Take 1 tablet (50 mg total) by mouth daily, Disp: 90 tablet, Rfl: 3    Current Allergies     Allergies as of 06/21/2024   • (No Known Allergies)            The following portions of the patient's history were reviewed and updated as appropriate: allergies, current medications, past family history, past medical history, past social history, past surgical history and problem list.     Past Medical History:   Diagnosis Date   • Asthma 01/30/2024   • CPAP (continuous positive airway pressure) dependence    • Hyperlipidemia    • Nasal disorder    • 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 patient    • Sleep apnea        Past Surgical History:   Procedure Laterality Date   • CARDIAC CATHETERIZATION     • HERNIA REPAIR Right     inguinal   • NASAL DILATION     • RI CORRJ HLX VLGS BNCTY SESMDC PROX PHLX OSTEOT Right 04/21/2021    Procedure: BUNIONECTOMY CAILIN, And tibial sesamoid excision;  Surgeon: James R Lachman, MD;  Location: [facility];  Service: Orthopedics   • RI LAPAROSCOPY SURG RPR INITIAL INGUINAL HERNIA Left 4/19/2023    Procedure: REPAIR HERNIA INGUINAL W/ ROBOT, LYSIS OF ADHESION;  Surgeon: Sana Vogel MD;  Location: [facility];  Service: General       No family history on file.      Medications have been verified.        Objective   /86   Pulse 71   Temp (!) 97.2 °F (36.2 °C)   Wt 100 kg (221 lb)   BMI 36.78 kg/m²   No LMP for male patient.       Physical Exam     Physical Exam  Vitals reviewed.   Constitutional:       Appearance: Normal appearance.   HENT:      Right Ear: Hearing, tympanic membrane and ear canal normal.      Left Ear: Hearing and ear canal normal. Tympanic membrane is retracted.      Nose: Nasal deformity and congestion present.      Mouth/Throat:      Lips: Pink.      Mouth: Mucous membranes are moist.      Pharynx: Oropharynx is clear. No posterior oropharyngeal erythema or postnasal drip.   Neurological:      Mental Status: He is alert.

## 2024-09-10 ENCOUNTER — APPOINTMENT (OUTPATIENT)
Dept: LAB | Age: 49
End: 2024-09-10

## 2024-09-10 DIAGNOSIS — Z00.8 HEALTH EXAMINATION IN POPULATION SURVEY: ICD-10-CM

## 2024-09-10 LAB
CHOLEST SERPL-MCNC: 107 MG/DL
HDLC SERPL-MCNC: 32 MG/DL
LDLC SERPL CALC-MCNC: 54 MG/DL (ref 0–100)
NONHDLC SERPL-MCNC: 75 MG/DL
TRIGL SERPL-MCNC: 104 MG/DL

## 2024-09-10 PROCEDURE — 80061 LIPID PANEL: CPT

## 2024-09-10 PROCEDURE — 83036 HEMOGLOBIN GLYCOSYLATED A1C: CPT

## 2024-09-10 PROCEDURE — 36415 COLL VENOUS BLD VENIPUNCTURE: CPT

## 2024-09-11 LAB
EST. AVERAGE GLUCOSE BLD GHB EST-MCNC: 117 MG/DL
HBA1C MFR BLD: 5.7 %

## 2025-02-11 ENCOUNTER — OFFICE VISIT (OUTPATIENT)
Dept: URGENT CARE | Age: 50
End: 2025-02-11
Payer: COMMERCIAL

## 2025-02-11 VITALS
WEIGHT: 221 LBS | DIASTOLIC BLOOD PRESSURE: 98 MMHG | OXYGEN SATURATION: 97 % | RESPIRATION RATE: 18 BRPM | SYSTOLIC BLOOD PRESSURE: 146 MMHG | HEART RATE: 89 BPM | TEMPERATURE: 98 F | BODY MASS INDEX: 36.78 KG/M2

## 2025-02-11 DIAGNOSIS — J06.9 ACUTE URI: Primary | ICD-10-CM

## 2025-02-11 DIAGNOSIS — J02.9 SORE THROAT: ICD-10-CM

## 2025-02-11 LAB — S PYO AG THROAT QL: NEGATIVE

## 2025-02-11 PROCEDURE — 99213 OFFICE O/P EST LOW 20 MIN: CPT

## 2025-02-11 PROCEDURE — 87070 CULTURE OTHR SPECIMN AEROBIC: CPT

## 2025-02-11 PROCEDURE — 87880 STREP A ASSAY W/OPTIC: CPT

## 2025-02-11 NOTE — PROGRESS NOTES
[facility]        NAME: Miguelangel Marr is a 49 y.o. male  : 1975    MRN: [id number]  DATE: 2025  TIME: 3:13 PM    Assessment and Plan   Acute URI [J06.9]  1. Acute URI        2. Sore throat  POCT rapid ANTIGEN strepA    Throat culture    Throat culture        Rapid strep neck.  Daughter diagnosed with strep throat.  Will send for throat culture.    Patient Instructions       Follow up with PCP in 3-5 days.  Proceed to  ER if symptoms worsen.    If tests have been performed at [facility], our office will contact you with results if changes need to be made to the care plan discussed with you at the visit.  You can review your full results on [facility].    Chief Complaint     Chief Complaint   Patient presents with    Sore Throat     Since yesterday morning  States daughter tested positive for strep          History of Present Illness       49-year-old male presents for cold-like symptoms x 2 days.  Daughter recently diagnosed with strep.  No over-the-counter medication the past 8 hours.  Denies fevers.  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       Review of Systems   Review of Systems   Constitutional:  Negative for chills and fever.   HENT:  Positive for congestion, ear pain, postnasal drip and sore throat.    Respiratory:  Positive for cough.    Neurological:  Positive for headaches.         Current Medications       Current Outpatient Medications:     albuterol (Ventolin HFA) 90 mcg/act inhaler, Inhale 2 puffs every 6 (six) hours as needed for wheezing, Disp: 18 g, Rfl: 3    aspirin 81 mg chewable tablet, Chew 81 mg daily, Disp: , Rfl:     atorvastatin (LIPITOR) 40 mg tablet, Take 1 tablet (40 mg total) by mouth daily, Disp: 90 tablet, Rfl: 3    losartan (COZAAR) 50 mg tablet, Take 1 tablet (50 mg total) by mouth daily, Disp: 90 tablet, Rfl: 3    metoprolol succinate  (TOPROL-XL) 50 mg 24 hr tablet, Take 1 tablet (50 mg total) by mouth daily, Disp: 90 tablet, Rfl: 3    benzonatate (TESSALON PERLES) 100 mg capsule, Take 1 capsule (100 mg total) by mouth 3 (three) times a day as needed for cough (Patient not taking: Reported on 1/4/2024), Disp: 20 capsule, Rfl: 0    Current Allergies     Allergies as of 02/11/2025    (No Known Allergies)            The following portions of the patient's history were reviewed and updated as appropriate: allergies, current medications, past family history, past medical history, past social history, past surgical history and problem list.     Past Medical History:   Diagnosis Date    Asthma 01/30/2024    CPAP (continuous positive airway pressure) dependence     Hyperlipidemia     Nasal disorder     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 patient     Sleep apnea        Past Surgical History:   Procedure Laterality Date    CARDIAC CATHETERIZATION      HERNIA REPAIR Right     inguinal    NASAL DILATION      GA CORRJ HLX VLGS BNCTY SESMDC PROX PHLX OSTEOT Right 04/21/2021    Procedure: BUNIONECTOMY CAILIN, And tibial sesamoid excision;  Surgeon: James R Lachman, MD;  Location: [facility];  Service: Orthopedics    GA LAPAROSCOPY SURG RPR INITIAL INGUINAL HERNIA Left 4/19/2023    Procedure: REPAIR HERNIA INGUINAL W/ ROBOT, LYSIS OF ADHESION;  Surgeon: Sana Vogel MD;  Location: [facility];  Service: General       No family history on file.      Medications have been verified.        Objective   /98   Pulse 89   Temp 98 °F (36.7 °C)   Resp 18   Wt 100 kg (221 lb)   SpO2 97%   BMI 36.78 kg/m²   No LMP for male patient.       Physical Exam     Physical Exam  Vitals and nursing note reviewed.   Constitutional:       General: He is not in acute distress.     Appearance: He is not toxic-appearing.   HENT:      Head: Normocephalic and atraumatic.      Right Ear: Tympanic membrane, ear canal and external ear normal.      Left Ear: Tympanic membrane, ear  canal and external ear normal.      Nose: Rhinorrhea present.      Mouth/Throat:      Mouth: Mucous membranes are moist.      Pharynx: No oropharyngeal exudate or posterior oropharyngeal erythema.   Eyes:      Conjunctiva/sclera: Conjunctivae normal.   Cardiovascular:      Rate and Rhythm: Normal rate and regular rhythm.   Pulmonary:      Effort: Pulmonary effort is normal.      Breath sounds: Normal breath sounds.   Lymphadenopathy:      Cervical: No cervical adenopathy.   Neurological:      Mental Status: He is alert.   Psychiatric:         Mood and Affect: Mood normal.         Behavior: Behavior normal.

## 2025-02-13 LAB — BACTERIA THROAT CULT: NORMAL

## 2025-04-07 ENCOUNTER — APPOINTMENT (OUTPATIENT)
Dept: LAB | Facility: CLINIC | Age: 50
End: 2025-04-07
Payer: COMMERCIAL

## 2025-04-07 ENCOUNTER — OFFICE VISIT (OUTPATIENT)
Dept: INTERNAL MEDICINE CLINIC | Facility: CLINIC | Age: 50
End: 2025-04-07
Payer: COMMERCIAL

## 2025-04-07 VITALS
BODY MASS INDEX: 36.65 KG/M2 | SYSTOLIC BLOOD PRESSURE: 146 MMHG | WEIGHT: 220 LBS | TEMPERATURE: 97.9 F | RESPIRATION RATE: 16 BRPM | HEIGHT: 65 IN | DIASTOLIC BLOOD PRESSURE: 98 MMHG | OXYGEN SATURATION: 98 % | HEART RATE: 84 BPM

## 2025-04-07 DIAGNOSIS — R73.03 PREDIABETES: ICD-10-CM

## 2025-04-07 DIAGNOSIS — I10 ESSENTIAL (PRIMARY) HYPERTENSION: ICD-10-CM

## 2025-04-07 DIAGNOSIS — R07.9 CHEST PAIN, UNSPECIFIED TYPE: ICD-10-CM

## 2025-04-07 DIAGNOSIS — E78.5 HYPERLIPIDEMIA, UNSPECIFIED HYPERLIPIDEMIA TYPE: ICD-10-CM

## 2025-04-07 DIAGNOSIS — G47.33 OSA ON CPAP: ICD-10-CM

## 2025-04-07 DIAGNOSIS — R94.39 ABNORMAL NUCLEAR STRESS TEST: ICD-10-CM

## 2025-04-07 DIAGNOSIS — I10 ESSENTIAL (PRIMARY) HYPERTENSION: Primary | ICD-10-CM

## 2025-04-07 PROBLEM — J45.909 ASTHMA: Status: RESOLVED | Noted: 2024-01-30 | Resolved: 2025-04-07

## 2025-04-07 PROBLEM — E66.01 CLASS 2 SEVERE OBESITY DUE TO EXCESS CALORIES WITH SERIOUS COMORBIDITY AND BODY MASS INDEX (BMI) OF 36.0 TO 36.9 IN ADULT (HCC): Status: ACTIVE | Noted: 2020-11-18

## 2025-04-07 LAB
ALBUMIN SERPL BCG-MCNC: 4.3 G/DL (ref 3.5–5)
ALP SERPL-CCNC: 71 U/L (ref 34–104)
ALT SERPL W P-5'-P-CCNC: 11 U/L (ref 7–52)
ANION GAP SERPL CALCULATED.3IONS-SCNC: 6 MMOL/L (ref 4–13)
AST SERPL W P-5'-P-CCNC: 14 U/L (ref 13–39)
BASOPHILS # BLD AUTO: 0.07 THOUSANDS/ÂΜL (ref 0–0.1)
BASOPHILS NFR BLD AUTO: 1 % (ref 0–1)
BILIRUB SERPL-MCNC: 0.5 MG/DL (ref 0.2–1)
BUN SERPL-MCNC: 19 MG/DL (ref 5–25)
CALCIUM SERPL-MCNC: 9.3 MG/DL (ref 8.4–10.2)
CHLORIDE SERPL-SCNC: 106 MMOL/L (ref 96–108)
CHOLEST SERPL-MCNC: 181 MG/DL (ref ?–200)
CO2 SERPL-SCNC: 30 MMOL/L (ref 21–32)
CREAT SERPL-MCNC: 0.99 MG/DL (ref 0.6–1.3)
EOSINOPHIL # BLD AUTO: 0.56 THOUSAND/ÂΜL (ref 0–0.61)
EOSINOPHIL NFR BLD AUTO: 7 % (ref 0–6)
ERYTHROCYTE [DISTWIDTH] IN BLOOD BY AUTOMATED COUNT: 12.1 % (ref 11.6–15.1)
GFR SERPL CREATININE-BSD FRML MDRD: 89 ML/MIN/1.73SQ M
GLUCOSE P FAST SERPL-MCNC: 97 MG/DL (ref 65–99)
HCT VFR BLD AUTO: 42.8 % (ref 36.5–49.3)
HDLC SERPL-MCNC: 36 MG/DL
HGB BLD-MCNC: 14.6 G/DL (ref 12–17)
IMM GRANULOCYTES # BLD AUTO: 0.03 THOUSAND/UL (ref 0–0.2)
IMM GRANULOCYTES NFR BLD AUTO: 0 % (ref 0–2)
LDLC SERPL CALC-MCNC: 101 MG/DL (ref 0–100)
LYMPHOCYTES # BLD AUTO: 2.36 THOUSANDS/ÂΜL (ref 0.6–4.47)
LYMPHOCYTES NFR BLD AUTO: 31 % (ref 14–44)
MCH RBC QN AUTO: 30.9 PG (ref 26.8–34.3)
MCHC RBC AUTO-ENTMCNC: 34.1 G/DL (ref 31.4–37.4)
MCV RBC AUTO: 91 FL (ref 82–98)
MONOCYTES # BLD AUTO: 0.73 THOUSAND/ÂΜL (ref 0.17–1.22)
MONOCYTES NFR BLD AUTO: 10 % (ref 4–12)
NEUTROPHILS # BLD AUTO: 3.94 THOUSANDS/ÂΜL (ref 1.85–7.62)
NEUTS SEG NFR BLD AUTO: 51 % (ref 43–75)
NRBC BLD AUTO-RTO: 0 /100 WBCS
PLATELET # BLD AUTO: 252 THOUSANDS/UL (ref 149–390)
PMV BLD AUTO: 10.8 FL (ref 8.9–12.7)
POTASSIUM SERPL-SCNC: 4.2 MMOL/L (ref 3.5–5.3)
PROT SERPL-MCNC: 7 G/DL (ref 6.4–8.4)
RBC # BLD AUTO: 4.73 MILLION/UL (ref 3.88–5.62)
SODIUM SERPL-SCNC: 142 MMOL/L (ref 135–147)
TRIGL SERPL-MCNC: 218 MG/DL (ref ?–150)
WBC # BLD AUTO: 7.69 THOUSAND/UL (ref 4.31–10.16)

## 2025-04-07 PROCEDURE — 80061 LIPID PANEL: CPT

## 2025-04-07 PROCEDURE — 36415 COLL VENOUS BLD VENIPUNCTURE: CPT

## 2025-04-07 PROCEDURE — 99204 OFFICE O/P NEW MOD 45 MIN: CPT | Performed by: INTERNAL MEDICINE

## 2025-04-07 PROCEDURE — 80053 COMPREHEN METABOLIC PANEL: CPT

## 2025-04-07 PROCEDURE — 85025 COMPLETE CBC W/AUTO DIFF WBC: CPT

## 2025-04-07 PROCEDURE — 83036 HEMOGLOBIN GLYCOSYLATED A1C: CPT

## 2025-04-07 RX ORDER — LOSARTAN POTASSIUM 50 MG/1
50 TABLET ORAL DAILY
Qty: 90 TABLET | Refills: 0 | Status: SHIPPED | OUTPATIENT
Start: 2025-04-07

## 2025-04-07 RX ORDER — METOPROLOL SUCCINATE 50 MG/1
50 TABLET, EXTENDED RELEASE ORAL DAILY
Qty: 90 TABLET | Refills: 0 | Status: SHIPPED | OUTPATIENT
Start: 2025-04-07

## 2025-04-07 RX ORDER — ATORVASTATIN CALCIUM 40 MG/1
40 TABLET, FILM COATED ORAL DAILY
Qty: 90 TABLET | Refills: 3 | Status: SHIPPED | OUTPATIENT
Start: 2025-04-07

## 2025-04-07 NOTE — ASSESSMENT & PLAN NOTE
Not at goal at today's visit, however patient has been out of his anti-hypertensive medications.  I have asked Miguelangel to keep BP log for few weeks and we will review at next visit to see if he needs adjustment to his anti-hypertensive regimen.  I have refilled his anti-hypertensive medications at today's visit.    Orders:  •  losartan (COZAAR) 50 mg tablet; Take 1 tablet (50 mg total) by mouth daily  •  metoprolol succinate (TOPROL-XL) 50 mg 24 hr tablet; Take 1 tablet (50 mg total) by mouth daily  •  atorvastatin (LIPITOR) 40 mg tablet; Take 1 tablet (40 mg total) by mouth daily  •  Comprehensive metabolic panel; Future  •  CBC and differential; Future

## 2025-04-07 NOTE — ASSESSMENT & PLAN NOTE
Re-establish with sleep medicine to discuss options for delivery device. Non-compliant with CPAP.     Orders:  •  Ambulatory Referral to Sleep Medicine; Future  Colonoscopy - 1/30/24.  Tubular adenomas.  Repeat due in 3 years 1/30/27

## 2025-04-07 NOTE — PROGRESS NOTES
Name: Miguelangel Marr      : 1975      MRN: [id number]  Encounter Provider: Leonard Albright MD  Encounter Date: 2025   Encounter department: [facility]  :  Assessment & Plan  Essential (primary) hypertension  Not at goal at today's visit, however patient has been out of his anti-hypertensive medications.  I have asked Miguelangel to keep BP log for few weeks and we will review at next visit to see if he needs adjustment to his anti-hypertensive regimen.  I have refilled his anti-hypertensive medications at today's visit.    Orders:  •  losartan (COZAAR) 50 mg tablet; Take 1 tablet (50 mg total) by mouth daily  •  metoprolol succinate (TOPROL-XL) 50 mg 24 hr tablet; Take 1 tablet (50 mg total) by mouth daily  •  atorvastatin (LIPITOR) 40 mg tablet; Take 1 tablet (40 mg total) by mouth daily  •  Comprehensive metabolic panel; Future  •  CBC and differential; Future    Hyperlipidemia, unspecified hyperlipidemia type    Orders:  •  losartan (COZAAR) 50 mg tablet; Take 1 tablet (50 mg total) by mouth daily  •  atorvastatin (LIPITOR) 40 mg tablet; Take 1 tablet (40 mg total) by mouth daily  •  Lipid Panel with Direct LDL reflex; Future  •  Comprehensive metabolic panel; Future  •  CBC and differential; Future    Prediabetes  Dietary and lifestyle modification.  Carbohydrate reduction.    Orders:  •  Hemoglobin A1C; Future  •  Comprehensive metabolic panel; Future  •  CBC and differential; Future    MANOJ on CPAP  Re-establish with sleep medicine to discuss options for delivery device. Non-compliant with CPAP.     Orders:  •  Ambulatory Referral to Sleep Medicine; Future  Colonoscopy - 24.  Tubular adenomas.  Repeat due in 3 years 27  IMPRESSION:  Scattered diverticulosis of moderate severity in the sigmoid colon  One polyp; performed cold forceps biopsy with piecemeal removal  One [place] Is polyp measuring 10-19 mm in the proximal ascending colon; performed cold snare  "removal  Subcentimeter polyp in the proximal ascending colon was removed with cold forceps biopsy  One [place] Is polyp measuring 10-19 mm in the proximal descending colon; performed cold forceps biopsy with piecemeal removal; performed cold snare with partial removal       History of Present Illness   I had the pleasure of seeing Mr. Marr in the office today to establish care.  Hx of HTN, MANOJ, prediabetes, HLD, obesity, asthma, L hernia repair/lysis of adhesions, closed fracture of sesamoid bone of right foot s/p right tibial sesamoid excision and Akin bunion correction 2021 who presents to the office for follow-up.  Patient is doing ok.  He expresses interest in becoming more active to help with weight loss efforts.  He wants to start walking as the weather gets nicer.  He has been out of his anti-hypertensive medications.  Not using CPAP anymore due to mask issues/tolerability.  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        Review of Systems   Respiratory:  Negative for shortness of breath and wheezing.    Cardiovascular:  Positive for leg swelling. Negative for chest pain and palpitations.   Gastrointestinal:  Negative for blood in stool, diarrhea, nausea and vomiting.   Musculoskeletal:  Positive for arthralgias.       Objective   /98 (BP Location: Left arm, Patient Position: Sitting, Cuff Size: Large)   Pulse 84   Temp 97.9 °F (36.6 °C) (Tympanic)   Resp 16   Ht 5' 5\" (1.651 m)   Wt 99.8 kg (220 lb)   SpO2 98%   BMI 36.61 kg/m²      Physical Exam  Vitals reviewed.   Constitutional:       General: He is not in acute distress.     Appearance: He is not ill-appearing or toxic-appearing.   Cardiovascular:      Rate and Rhythm: Normal rate and regular rhythm.   Pulmonary:      Effort: No respiratory distress.      Breath sounds: No wheezing, rhonchi or rales.   Abdominal:      General: There is no distension.      Palpations: There is no mass.      Tenderness: There is no abdominal tenderness. " There is no guarding.   Musculoskeletal:         General: Swelling (RLE>LLE) present.   Neurological:      Mental Status: Mental status is at baseline.

## 2025-04-07 NOTE — ASSESSMENT & PLAN NOTE
Orders:  •  losartan (COZAAR) 50 mg tablet; Take 1 tablet (50 mg total) by mouth daily  •  atorvastatin (LIPITOR) 40 mg tablet; Take 1 tablet (40 mg total) by mouth daily  •  Lipid Panel with Direct LDL reflex; Future  •  Comprehensive metabolic panel; Future  •  CBC and differential; Future

## 2025-04-07 NOTE — ASSESSMENT & PLAN NOTE
Dietary and lifestyle modification.  Carbohydrate reduction.    Orders:  •  Hemoglobin A1C; Future  •  Comprehensive metabolic panel; Future  •  CBC and differential; Future

## 2025-04-08 ENCOUNTER — RESULTS FOLLOW-UP (OUTPATIENT)
Dept: INTERNAL MEDICINE CLINIC | Facility: CLINIC | Age: 50
End: 2025-04-08

## 2025-04-08 LAB
EST. AVERAGE GLUCOSE BLD GHB EST-MCNC: 123 MG/DL
HBA1C MFR BLD: 5.9 %

## 2025-04-08 NOTE — RESULT ENCOUNTER NOTE
Pan Means,    I wanted to let you know I got the results of your blood work yesterday.  Your A1c is still in the pre-diabetic range (A1c 5.7-6.4) at 5.9, slightly higher from 6 months ago when you were 5.7.  Your triglycerides are a little high, ideally would like to keep them <150.  I would not make any adjustments to your medications right now, getting you back on your atorvastatin should also help to bring down the triglycerides a little bit.  Reducing your carbohydrate consumption (pasta, rice, breads, starchy vegetables ie. Potatoes) will help bring down the A1c and triglycerides as well.  We can talk more about diet at your physical.  Otherwise the rest of your blood work looked good, let's plan to repeat a lipid panel and A1c in 6 months.      Let me know if you have any questions.    Take care,    Dr. Albright

## 2025-04-09 DIAGNOSIS — E78.5 HYPERLIPIDEMIA, UNSPECIFIED HYPERLIPIDEMIA TYPE: Primary | ICD-10-CM

## 2025-04-09 DIAGNOSIS — R73.03 PREDIABETES: ICD-10-CM

## 2025-05-12 ENCOUNTER — OFFICE VISIT (OUTPATIENT)
Dept: INTERNAL MEDICINE CLINIC | Facility: CLINIC | Age: 50
End: 2025-05-12
Payer: COMMERCIAL

## 2025-05-12 VITALS
TEMPERATURE: 98.2 F | OXYGEN SATURATION: 98 % | WEIGHT: 222 LBS | SYSTOLIC BLOOD PRESSURE: 138 MMHG | RESPIRATION RATE: 14 BRPM | HEIGHT: 65 IN | HEART RATE: 71 BPM | BODY MASS INDEX: 36.99 KG/M2 | DIASTOLIC BLOOD PRESSURE: 82 MMHG

## 2025-05-12 DIAGNOSIS — Z11.4 SCREENING FOR HIV (HUMAN IMMUNODEFICIENCY VIRUS): ICD-10-CM

## 2025-05-12 DIAGNOSIS — G47.33 OSA ON CPAP: ICD-10-CM

## 2025-05-12 DIAGNOSIS — Z00.00 ANNUAL PHYSICAL EXAM: Primary | ICD-10-CM

## 2025-05-12 DIAGNOSIS — Z11.59 NEED FOR HEPATITIS C SCREENING TEST: ICD-10-CM

## 2025-05-12 DIAGNOSIS — H54.7 VISION PROBLEMS: ICD-10-CM

## 2025-05-12 DIAGNOSIS — I10 PRIMARY HYPERTENSION: ICD-10-CM

## 2025-05-12 PROCEDURE — 99396 PREV VISIT EST AGE 40-64: CPT | Performed by: INTERNAL MEDICINE

## 2025-05-12 NOTE — ASSESSMENT & PLAN NOTE
Blood pressures at home ranging low 100s to 120    Will make no adjustments to regimen today, ambulatory readings are well controlled

## 2025-05-12 NOTE — PROGRESS NOTES
Adult Annual Physical  Name: Miguelangel Marr      : 1975      MRN: [id number]  Encounter Provider: Leonard Albright MD  Encounter Date: 2025   Encounter department: [facility]    :  Assessment & Plan  Annual physical exam    Orders:  •  Ambulatory Referral to Dentistry; Future    Primary hypertension  Blood pressures at home ranging low 100s to 120    Will make no adjustments to regimen today, ambulatory readings are well controlled         MANOJ on CPAP  Not currently on CPAP therapy, has appt this Thursday with sleep medicine       Vision problems    Orders:  •  Ambulatory Referral to Optometry; Future    Screening for HIV (human immunodeficiency virus)    Orders:  •  HIV 1/2 AG/AB w Reflex [facility] for 2 yr old and above; Future    Need for hepatitis C screening test    Orders:  •  Hepatitis C antibody; Future        Preventive Screenings:  - Diabetes Screening: screening up-to-date  - Cholesterol Screening: screening not indicated and has hyperlipidemia   - Colon cancer screening: screening up-to-date   - Lung cancer screening: screening not indicated     Immunizations:  - Immunizations due: Prevnar 20 and Tdap         History of Present Illness     Adult Annual Physical:  Patient presents for annual physical.     Diet and Physical Activity:  - Diet/Nutrition: no special diet.  - Exercise: walking.    General Health:  - Sleep: 4-6 hours of sleep on average.  - Hearing: normal hearing bilateral ears.  - Vision: vision problems. Wear reading glasses  - Dental: no dental visits for > 1 year, brushes teeth twice daily and floss regularly.    /GYN Health:  - Follows with GYN: no.   - History of STDs: no     Health:  - History of STDs: no.   - Urinary symptoms: none.     Advanced Care Planning:  - Has an advanced directive?: no    - Has a durable medical POA?: no      Review of Systems   Constitutional:  Negative for chills and fever.   Eyes:  Positive for visual disturbance.  "  Respiratory:  Negative for shortness of breath.    Cardiovascular:  Negative for chest pain and palpitations.   Gastrointestinal:  Positive for constipation. Negative for nausea and vomiting.   Musculoskeletal:  Positive for arthralgias.             Current Outpatient Medications on File Prior to Visit   Medication Sig Dispense Refill   • albuterol (Ventolin HFA) 90 mcg/act inhaler Inhale 2 puffs every 6 (six) hours as needed for wheezing 18 g 3   • aspirin 81 mg chewable tablet Chew 81 mg daily     • atorvastatin (LIPITOR) 40 mg tablet Take 1 tablet (40 mg total) by mouth daily 90 tablet 3   • losartan (COZAAR) 50 mg tablet Take 1 tablet (50 mg total) by mouth daily 90 tablet 0   • metoprolol succinate (TOPROL-XL) 50 mg 24 hr tablet Take 1 tablet (50 mg total) by mouth daily 90 tablet 0     No current facility-administered medications on file prior to visit.      Social History     Tobacco Use   • Smoking status: Never     Passive exposure: Never   • Smokeless tobacco: Never   Vaping Use   • Vaping status: Never Used   Substance and Sexual Activity   • Alcohol use: Never   • Drug use: Never   • Sexual activity: Yes     Partners: Female       Objective   /82 (BP Location: Left arm, Patient Position: Sitting, Cuff Size: Large)   Pulse 71   Temp 98.2 °F (36.8 °C) (Tympanic)   Resp 14   Ht 5' 5\" (1.651 m)   Wt 101 kg (222 lb)   SpO2 98%   BMI 36.94 kg/m²     Physical Exam  Vitals reviewed.   Constitutional:       General: He is not in acute distress.     Appearance: He is not toxic-appearing.   Cardiovascular:      Rate and Rhythm: Normal rate and regular rhythm.   Pulmonary:      Effort: No respiratory distress.      Breath sounds: No wheezing, rhonchi or rales.   Abdominal:      General: There is no distension.      Palpations: Abdomen is soft.      Tenderness: There is no abdominal tenderness.   Musculoskeletal:         General: Swelling (trace ankles) present.   Skin:     General: Skin is warm and " dry.   Neurological:      Mental Status: Mental status is at baseline.

## 2025-05-15 ENCOUNTER — OFFICE VISIT (OUTPATIENT)
Dept: SLEEP CENTER | Facility: CLINIC | Age: 50
End: 2025-05-15
Attending: INTERNAL MEDICINE
Payer: COMMERCIAL

## 2025-05-15 VITALS
HEIGHT: 65 IN | OXYGEN SATURATION: 97 % | SYSTOLIC BLOOD PRESSURE: 142 MMHG | BODY MASS INDEX: 37.32 KG/M2 | HEART RATE: 83 BPM | WEIGHT: 224 LBS | DIASTOLIC BLOOD PRESSURE: 90 MMHG

## 2025-05-15 DIAGNOSIS — E66.812 CLASS 2 SEVERE OBESITY DUE TO EXCESS CALORIES WITH SERIOUS COMORBIDITY AND BODY MASS INDEX (BMI) OF 36.0 TO 36.9 IN ADULT (HCC): ICD-10-CM

## 2025-05-15 DIAGNOSIS — I10 PRIMARY HYPERTENSION: ICD-10-CM

## 2025-05-15 DIAGNOSIS — G47.33 OSA (OBSTRUCTIVE SLEEP APNEA): Primary | ICD-10-CM

## 2025-05-15 DIAGNOSIS — E66.01 CLASS 2 SEVERE OBESITY DUE TO EXCESS CALORIES WITH SERIOUS COMORBIDITY AND BODY MASS INDEX (BMI) OF 36.0 TO 36.9 IN ADULT (HCC): ICD-10-CM

## 2025-05-15 DIAGNOSIS — R73.03 PREDIABETES: ICD-10-CM

## 2025-05-15 PROCEDURE — 99244 OFF/OP CNSLTJ NEW/EST MOD 40: CPT | Performed by: STUDENT IN AN ORGANIZED HEALTH CARE EDUCATION/TRAINING PROGRAM

## 2025-05-15 NOTE — PROGRESS NOTES
Name: Miguelangel Marr      : 1975      MRN: [id number]  Encounter Provider: Morgan Cruz MD  Encounter Date: 5/15/2025   Encounter department: [facility] SLEEP MEDICINE [facility]    :  Assessment & Plan  MANOJ (obstructive sleep apnea)  Moderate Obstructive Sleep Apnea - Discussed pathophysiology of MANOJ, consequences of untreated MANOJ and treatment options including PAP therapy, mandibular advancement device, positional therapy, and surgical referral. - Discussed risks of sleepy driving and mitigation strategies (napping). Patient agrees to not drive if tired or sleepy. - Advised avoidance of alcohol and centrally acting medications as these can worsen MANOJ.  - Miguelangel presents today for new patient evaluation of obstructive sleep apnea and sleep disordered breathing.  He previously underwent a diagnostic in-lab study greater than 10 years ago, he then had a home sleep study in  which revealed moderate obstructive sleep apnea.  He previously had a [employer] DreamStation machine which was recalled.  He is here today for new patient evaluation and to reestablish care with sleep medicine.  Split-night sleep study order has been placed today in the office.  It would be valuable to have an initial diagnostic portion of the study followed by therapeutic portion of the study to best titrate positive airway pressure therapy.  I have high clinical concern for continued obstructive sleep apnea which is impacting his health at this time.  - I have asked the patient to return to the office once sleep testing is completed to review study results and discuss treatment options.  Patient verbalized understanding and stated that they would do so.    Orders:    Split Night Sleep Study; Future    Primary hypertension  Hypertension - We reviewed the association between untreated obstructive sleep apnea and the increased risk for hypertension. Patient to continue on prescribed anti-hypertensive therapy and follow up with  "PCP for continuity of care.          Class 2 severe obesity due to excess calories with serious comorbidity and body mass index (BMI) of 36.0 to 36.9 in adult (HCC)  Elevated BMI - We reviewed the association of elevated BMI on obstructive sleep apnea and sleep disordered breathing. Encouraged patient to follow healthy diet, regular exercise regimen, and pursue weight loss to manage symptoms.          Prediabetes  With reported history of type 2 diabetes. With most recent A1C of   Lab Results   Component Value Date    HGBA1C 5.9 (H) 04/07/2025   . There is an association between sleep and glucose control and insulin sensitivity.  I have encouraged the patient to aim for at least 7 to 9 hours of sleep nightly for overall health and wellness.  For updated sleep testing as above.           History of Present Illness           Sitting and reading: (Patient-Rptd) Slight chance of dozing  Watching TV: (Patient-Rptd) Moderate chance of dozing  Sitting, inactive in a public place (e.g. a theatre or a meeting): (Patient-Rptd) Slight chance of dozing  As a passenger in a car for an hour without a break: (Patient-Rptd) Slight chance of dozing  Lying down to rest in the afternoon when circumstances permit: (Patient-Rptd) High chance of dozing  Sitting and talking to someone: (Patient-Rptd) Would never doze  Sitting quietly after a lunch without alcohol: (Patient-Rptd) Would never doze  In a car, while stopped for a few minutes in traffic: (Patient-Rptd) Would never doze  Total score: (Patient-Rptd) 8       Pertinent positives/negatives included in HPI and also as noted:     Objective   /90   Pulse 83   Ht 5' 5\" (1.651 m)   Wt 102 kg (224 lb)   SpO2 97%   BMI 37.28 kg/m²     Neck Circumference: 17  [facility] New Patient Evaluation    Mr. Marr is a 50 y.o. male with a PMH of hypertension, hyperlipidemia, prediabetes, asthma, obesity, who presents as a new patient for evaluation of Sleep Disordered " Breathing.     I have reviewed the 5/12/2025 internal medicine office note with Leonard Albright MD.    I have reviewed the 4/7/2025 internal medicine office note with Leonard Albright MD.     History of Presenting Illness:    The patient snores. There are choking and gasping episodes. There are witnessed apneas. 1-2x episode(s) of nocturia occur per night. The patient sleeps on his left side.There are no reports of nocturnal behaviors.     He reports a longstanding history of unrefreshing and nonrestorative sleep quality.    The patient's [place] sleepiness scale score is 8/24 (<=10 is normal). He has not been sleepy while driving. He has not had a fall-asleep motor vehicle accident. There are no reports of hypnagogic hallucinations, cataplexy or sleep paralysis.    In terms of the patient's sleep/wake cycle symptoms:  Bedtime: 10 PM  SOL: 15-30 minutes  Nocturnal awakenings: 1-2 X, Nocturia, tossing and turning  Wakeup time: 5:30 AM  Naps: Denied, but would have given the opportunity    Total sleep time estimate: Approximately 6-7 hours.     On weekends he will fall asleep around 1 to 2 AM and sleep until 7 AM.    He has not tried any medications for poor sleep in the past.    His legs do not bother him on trying to initiate sleep.    He previously underwent a home sleep study on 2/21/2021 which revealed moderate obstructive sleep apnea with an LILLIAN of 22.6 events per hour.  O2 dominga of 77%.    I reviewed this prior home sleep study together in detail today with the patient in office.    He also reported a prior diagnostic in-lab study through [facility] greater than 10 years ago which revealed clinically significant obstructive sleep apnea and sleep disordered breathing.    Past Medical History:   Diagnosis Date    Asthma 01/30/2024    CPAP (continuous positive airway pressure) dependence     Hyperlipidemia     Nasal disorder     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 patient     Sleep apnea         Past Surgical History:  "  Procedure Laterality Date    CARDIAC CATHETERIZATION      HERNIA REPAIR Right     inguinal    NASAL DILATION      [place] CORRJ HLX VLGS BNCTY SESMDC PROX PHLX OSTEOT Right 04/21/2021    Procedure: BUNIONECTOMY CAILIN, And tibial sesamoid excision;  Surgeon: James R Lachman, MD;  Location: [facility];  Service: Orthopedics    KS LAPAROSCOPY SURG RPR INITIAL INGUINAL HERNIA Left 4/19/2023    Procedure: REPAIR HERNIA INGUINAL W/ ROBOT, LYSIS OF ADHESION;  Surgeon: Sana Vogel MD;  Location: [facility];  Service: General        Prior nasal dilation surgery.     Allergies[1]     Medications Ordered Prior to Encounter[2]      Family History: His family history is not on file.    Father with history of snoring.     Social History:   Job: Works at [employer] in [employer]  Caffeine: 2 Cups of Coffee Daily, Rare soda with Dinner if Out  Alcohol: Denied  Drugs: Denied  Tobacco: Denied  Vape: Denied    Patient's medications, allergies, past medical, surgical, social and family histories were reviewed in the electronic medical record and updated as appropriate.    Review of Systems: On review of systems, the patient reports: Regular AM Headaches, regular nasal sinus congestion, does wake with dry mouth and dry throat in morning.    Vitals:    05/15/25 0908   BP: 142/90   Pulse: 83   SpO2: 97%       Physical Examination:  Gen: No acute distress, not visibly anxious, speaking comfortably  H&N: MM IV; no retro/micrognathia; no macroglossia; no visible thyromegaly  Neuro: Alert and oriented x3, interactive  Psych: Pleasant, normal affect  Skin: No visible rashes  Respiratory: No accessory muscle use, breathing comfortably  Cardiac: No visible edema of extremities  Abdomen: Soft, NT, nondistended  Musculoskeletal: Normal ROM Intact of Extremities    Study Results:  I reviewed the following labs:    No results found for: \"FERRITIN\"    Lab Results   Component Value Date    WBC 7.69 04/07/2025    HGB 14.6 04/07/2025    HCT 42.8 " "04/07/2025    MCV 91 04/07/2025     04/07/2025       This SmartLink has not been configured with any valid records.       Lab Results   Component Value Date    SODIUM 142 04/07/2025    K 4.2 04/07/2025     04/07/2025    CO2 30 04/07/2025    BUN 19 04/07/2025    CREATININE 0.99 04/07/2025    GLUC 104 11/21/2019    CALCIUM 9.3 04/07/2025       This SmartLink has not been configured with any valid records.       Lab Results   Component Value Date    MCP1WIFTNSAN 2.520 03/01/2024          Results/Data:  I have reviewed the results and report from the 3/18/2022 chest x-ray.    Independent Findings Reviewed: No acute cardiopulmonary disease.    I have reviewed the results and report from the 1/17/2020 transthoracic echocardiogram: LEFT VENTRICLE:  Systolic function was normal. Ejection fraction was estimated to be 60 %.  There were no regional wall motion abnormalities.  Wall thickness was at the upper limits of normal.    Independent Findings Reviewed: Normal left ventricular ejection fraction.  No wall motion abnormalities.    I answered the patient's questions to the best of my ability. We will continue with longitudinal follow-up for evaluation of sleep disordered breathing. Follow-up will be after sleep testing is completed.    Morgan Cruz MD  Sleep Medicine and Internal Medicine  [facility]  05/15/25      Portions of the record may have been created with voice recognition software.  Occasional wrong word or \"sound a like\" substitutions may have occurred due to the inherent limitations of voice recognition software.  Read the chart carefully and recognize, using context, where substitutions have occurred.            [1] No Known Allergies  [2]   Current Outpatient Medications on File Prior to Visit   Medication Sig Dispense Refill    albuterol (Ventolin HFA) 90 mcg/act inhaler Inhale 2 puffs every 6 (six) hours as needed for wheezing 18 g 3    aspirin 81 mg chewable tablet " Chew 81 mg in the morning.      atorvastatin (LIPITOR) 40 mg tablet Take 1 tablet (40 mg total) by mouth daily 90 tablet 3    losartan (COZAAR) 50 mg tablet Take 1 tablet (50 mg total) by mouth daily 90 tablet 0    metoprolol succinate (TOPROL-XL) 50 mg 24 hr tablet Take 1 tablet (50 mg total) by mouth daily 90 tablet 0     No current facility-administered medications on file prior to visit.

## 2025-05-15 NOTE — LETTER
May 15, 2025     Leonard Albright MD  [address] [facility] [address]    Patient: Miguelangel Marr   YOB: 1975   Date of Visit: 5/15/2025       Dear Dr. Leonard Albright MD:    Thank you for referring Miguelangel Marr to me for evaluation. Below are my notes for this consultation.    If you have questions, please do not hesitate to call me. I look forward to following your patient along with you.         Sincerely,        Morgan Cruz MD        CC: No Recipients    Morgan Cruz MD  5/15/2025  9:58 AM  Sign when Signing Visit  Name: Miguelangel Marr      : 1975      MRN: [id number]  Encounter Provider: Morgan Cruz MD  Encounter Date: 5/15/2025   Encounter department: [facility] SLEEP MEDICINE [facility]    :  Assessment & Plan  MANOJ (obstructive sleep apnea)  Moderate Obstructive Sleep Apnea - Discussed pathophysiology of MANOJ, consequences of untreated MANOJ and treatment options including PAP therapy, mandibular advancement device, positional therapy, and surgical referral. - Discussed risks of sleepy driving and mitigation strategies (napping). Patient agrees to not drive if tired or sleepy. - Advised avoidance of alcohol and centrally acting medications as these can worsen MANOJ.  - Miguelangel presents today for new patient evaluation of obstructive sleep apnea and sleep disordered breathing.  He previously underwent a diagnostic in-lab study greater than 10 years ago, he then had a home sleep study in  which revealed moderate obstructive sleep apnea.  He previously had a [employer] DreamStation machine which was recalled.  He is here today for new patient evaluation and to reestablish care with sleep medicine.  Split-night sleep study order has been placed today in the office.  It would be valuable to have an initial diagnostic portion of the study followed by therapeutic portion of the study to best titrate positive airway pressure therapy.  I have high clinical  concern for continued obstructive sleep apnea which is impacting his health at this time.  - I have asked the patient to return to the office once sleep testing is completed to review study results and discuss treatment options.  Patient verbalized understanding and stated that they would do so.    Orders:  •  Split Night Sleep Study; Future    Primary hypertension  Hypertension - We reviewed the association between untreated obstructive sleep apnea and the increased risk for hypertension. Patient to continue on prescribed anti-hypertensive therapy and follow up with PCP for continuity of care.          Class 2 severe obesity due to excess calories with serious comorbidity and body mass index (BMI) of 36.0 to 36.9 in adult (HCC)  Elevated BMI - We reviewed the association of elevated BMI on obstructive sleep apnea and sleep disordered breathing. Encouraged patient to follow healthy diet, regular exercise regimen, and pursue weight loss to manage symptoms.          Prediabetes  With reported history of type 2 diabetes. With most recent A1C of   Lab Results   Component Value Date    HGBA1C 5.9 (H) 04/07/2025   . There is an association between sleep and glucose control and insulin sensitivity.  I have encouraged the patient to aim for at least 7 to 9 hours of sleep nightly for overall health and wellness.  For updated sleep testing as above.           History of Present Illness          Sitting and reading: (Patient-Rptd) Slight chance of dozing  Watching TV: (Patient-Rptd) Moderate chance of dozing  Sitting, inactive in a public place (e.g. a theatre or a meeting): (Patient-Rptd) Slight chance of dozing  As a passenger in a car for an hour without a break: (Patient-Rptd) Slight chance of dozing  Lying down to rest in the afternoon when circumstances permit: (Patient-Rptd) High chance of dozing  Sitting and talking to someone: (Patient-Rptd) Would never doze  Sitting quietly after a lunch without alcohol:  "(Patient-Rptd) Would never doze  In a car, while stopped for a few minutes in traffic: (Patient-Rptd) Would never doze  Total score: (Patient-Rptd) 8       Pertinent positives/negatives included in HPI and also as noted:     Objective  /90   Pulse 83   Ht 5' 5\" (1.651 m)   Wt 102 kg (224 lb)   SpO2 97%   BMI 37.28 kg/m²     Neck Circumference: 17  [facility] New Patient Evaluation    Mr. Marr is a 50 y.o. male with a PMH of hypertension, hyperlipidemia, prediabetes, asthma, obesity, who presents as a new patient for evaluation of Sleep Disordered Breathing.     I have reviewed the 5/12/2025 internal medicine office note with Leonard Albright MD.    I have reviewed the 4/7/2025 internal medicine office note with Leonard Albright MD.     History of Presenting Illness:    The patient snores. There are choking and gasping episodes. There are witnessed apneas. 1-2x episode(s) of nocturia occur per night. The patient sleeps on his left side.There are no reports of nocturnal behaviors.     He reports a longstanding history of unrefreshing and nonrestorative sleep quality.    The patient's [place] sleepiness scale score is 8/24 (<=10 is normal). He has not been sleepy while driving. He has not had a fall-asleep motor vehicle accident. There are no reports of hypnagogic hallucinations, cataplexy or sleep paralysis.    In terms of the patient's sleep/wake cycle symptoms:  Bedtime: 10 PM  SOL: 15-30 minutes  Nocturnal awakenings: 1-2 X, Nocturia, tossing and turning  Wakeup time: 5:30 AM  Naps: Denied, but would have given the opportunity    Total sleep time estimate: Approximately 6-7 hours.     On weekends he will fall asleep around 1 to 2 AM and sleep until 7 AM.    He has not tried any medications for poor sleep in the past.    His legs do not bother him on trying to initiate sleep.    He previously underwent a home sleep study on 2/21/2021 which revealed moderate obstructive sleep apnea with an LILLIAN of " 22.6 events per hour.  O2 dominga of 77%.    I reviewed this prior home sleep study together in detail today with the patient in office.    He also reported a prior diagnostic in-lab study through [facility] greater than 10 years ago which revealed clinically significant obstructive sleep apnea and sleep disordered breathing.    Past Medical History:   Diagnosis Date   • Asthma 01/30/2024   • CPAP (continuous positive airway pressure) dependence    • Hyperlipidemia    • Nasal disorder    • 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 patient    • Sleep apnea         Past Surgical History:   Procedure Laterality Date   • CARDIAC CATHETERIZATION     • HERNIA REPAIR Right     inguinal   • NASAL DILATION     • [place] CORRJ HLX VLGS BNCTY SESMDC PROX PHLX OSTEOT Right 04/21/2021    Procedure: BUNIONECTOMY CAILIN, And tibial sesamoid excision;  Surgeon: James R Lachman, MD;  [facility];  Service: Orthopedics   • [place] LAPAROSCOPY SURG RPR INITIAL INGUINAL HERNIA Left 4/19/2023    Procedure: REPAIR HERNIA INGUINAL W/ ROBOT, LYSIS OF ADHESION;  Surgeon: Sana Vogel MD;  Location: [facility];  Service: General        Prior nasal dilation surgery.     Allergies[1]     Medications Ordered Prior to Encounter[2]      Family History: His family history is not on file.    Father with history of snoring.     Social History:   Job: Works at [employer] in [employer]  Caffeine: 2 Cups of Coffee Daily, Rare soda with Dinner if Out  Alcohol: Denied  Drugs: Denied  Tobacco: Denied  Vape: Denied    Patient's medications, allergies, past medical, surgical, social and family histories were reviewed in the electronic medical record and updated as appropriate.    Review of Systems: On review of systems, the patient reports: Regular AM Headaches, regular nasal sinus congestion, does wake with dry mouth and dry throat in morning.    Vitals:    05/15/25 0908   BP: 142/90   Pulse: 83   SpO2: 97%       Physical Examination:  Gen: No acute distress,  "not visibly anxious, speaking comfortably  H&N: MM IV; no retro/micrognathia; no macroglossia; no visible thyromegaly  Neuro: Alert and oriented x3, interactive  Psych: Pleasant, normal affect  Skin: No visible rashes  Respiratory: No accessory muscle use, breathing comfortably  Cardiac: No visible edema of extremities  Abdomen: Soft, NT, nondistended  Musculoskeletal: Normal ROM Intact of Extremities    Study Results:  I reviewed the following labs:    No results found for: \"FERRITIN\"    Lab Results   Component Value Date    WBC 7.69 04/07/2025    HGB 14.6 04/07/2025    HCT 42.8 04/07/2025    MCV 91 04/07/2025     04/07/2025       This SmartLink has not been configured with any valid records.       Lab Results   Component Value Date    SODIUM 142 04/07/2025    K 4.2 04/07/2025     04/07/2025    CO2 30 04/07/2025    BUN 19 04/07/2025    CREATININE 0.99 04/07/2025    GLUC 104 11/21/2019    CALCIUM 9.3 04/07/2025       This SmartLink has not been configured with any valid records.       Lab Results   Component Value Date    COK6EPZYISEB 2.520 03/01/2024          Results/Data:  I have reviewed the results and report from the 3/18/2022 chest x-ray.    Independent Findings Reviewed: No acute cardiopulmonary disease.    I have reviewed the results and report from the 1/17/2020 transthoracic echocardiogram: LEFT VENTRICLE:  Systolic function was normal. Ejection fraction was estimated to be 60 %.  There were no regional wall motion abnormalities.  Wall thickness was at the upper limits of normal.    Independent Findings Reviewed: Normal left ventricular ejection fraction.  No wall motion abnormalities.    I answered the patient's questions to the best of my ability. We will continue with longitudinal follow-up for evaluation of sleep disordered breathing. Follow-up will be after sleep testing is completed.    Morgan Cruz MD  Sleep Medicine and Internal Medicine  [facility] " "[facility]  05/15/25      Portions of the record may have been created with voice recognition software.  Occasional wrong word or \"sound a like\" substitutions may have occurred due to the inherent limitations of voice recognition software.  Read the chart carefully and recognize, using context, where substitutions have occurred.            [1] No Known Allergies  [2]   Current Outpatient Medications on File Prior to Visit   Medication Sig Dispense Refill   • albuterol (Ventolin HFA) 90 mcg/act inhaler Inhale 2 puffs every 6 (six) hours as needed for wheezing 18 g 3   • aspirin 81 mg chewable tablet Chew 81 mg in the morning.     • atorvastatin (LIPITOR) 40 mg tablet Take 1 tablet (40 mg total) by mouth daily 90 tablet 3   • losartan (COZAAR) 50 mg tablet Take 1 tablet (50 mg total) by mouth daily 90 tablet 0   • metoprolol succinate (TOPROL-XL) 50 mg 24 hr tablet Take 1 tablet (50 mg total) by mouth daily 90 tablet 0     No current facility-administered medications on file prior to visit.     "

## 2025-05-15 NOTE — ASSESSMENT & PLAN NOTE
With reported history of type 2 diabetes. With most recent A1C of   Lab Results   Component Value Date    HGBA1C 5.9 (H) 04/07/2025   . There is an association between sleep and glucose control and insulin sensitivity.  I have encouraged the patient to aim for at least 7 to 9 hours of sleep nightly for overall health and wellness.  For updated sleep testing as above.

## 2025-05-15 NOTE — ASSESSMENT & PLAN NOTE
Elevated BMI - We reviewed the association of elevated BMI on obstructive sleep apnea and sleep disordered breathing. Encouraged patient to follow healthy diet, regular exercise regimen, and pursue weight loss to manage symptoms.

## 2025-05-15 NOTE — PATIENT INSTRUCTIONS
"- A sleep study was ordered for you. It can be an in lab sleep study or a portable at home sleep study. It depends on what insurance approves.  Some insurances will only cover home sleep studies unless you have significant medical conditions like stroke or heart attack within the last 6 months, severe COPD, or the use of supplemental oxygen.    - The order goes electronically to the sleep center staff.    - The sleep center will get approval from your insurance and then will call you to schedule the sleep study.    - If you do not hear from the sleep center in 1 week, please call us to check the status of your order.    - There are different locations to get sleep study done.    - Please make sure you know what is the copay associated with your sleep study. Approval does not mean coverage.     - Once your sleep study is done, it can take up to 2 weeks to get results (depending on the volume).    - A follow up appointment to discuss sleep study results is required in most cases. On that visit, we will discuss results and treatment options.    - If your sleep study shows severe sleep apnea please expect contact from the sleep center. We will try to expedite your follow up appointment.    - The best way to communicate with us in through [facility] My [facility]. Make sure your account is active.    - Once you start CPAP therapy, it is mandatory by insurance, to have a follow up appointment with us within 31-90 days of getting CPAP.     Patient Education     Sleep apnea in adults   The Basics   Written by the doctors and editors at [facility]   What is sleep apnea? -- Sleep apnea is a condition that makes you stop breathing for short periods while you are asleep. There are 2 types of sleep apnea. One is called \"obstructive sleep apnea.\" The other is called \"central sleep apnea.\"  In obstructive sleep apnea, you stop breathing because your throat narrows or closes (figure 1). In central sleep apnea, you stop breathing because your " "brain does not send the right signals to your muscles to make you breathe. When people talk about sleep apnea, they are usually referring to obstructive sleep apnea, which is what this article is about.  People with sleep apnea do not know that they stop breathing when they are asleep. But they do sometimes wake up startled or gasping for breath. They also often hear from loved ones that they snore.  What are the symptoms of sleep apnea? -- The main symptoms of sleep apnea are loud snoring, tiredness, and daytime sleepiness. Other symptoms can include:   Restless sleep   Waking up choking or gasping   Morning headaches, dry mouth, or sore throat   Waking up often to urinate   Waking up feeling unrested or groggy   Trouble thinking clearly or remembering things  Some people with sleep apnea don't have symptoms, or don't realize that they have them.  Should I see a doctor or nurse? -- Yes. If you think that you might have sleep apnea, see your doctor.  Is there a test for sleep apnea? -- Yes. First, your doctor or nurse will ask about your symptoms. If you have a bed partner, they might also ask that person if you snore or gasp in your sleep. If the doctor or nurse suspects that you have sleep apnea, they might send you for a \"sleep study.\"  Sleep studies can sometimes be done at home, but they are usually done in a sleep lab. For the study, you spend the night in the lab, and you are hooked up to different machines that monitor your heart rate, breathing, and other body functions. The results of the test tell your doctor or nurse if you have the disorder.  Is there anything I can do on my own to help my sleep apnea? -- Yes. Some things that might help:   Try to avoid sleeping on your back, if possible. This might help some people.   Lose weight, if you have excess body weight.   Get regular physical activity. This might help you lose weight. But even if it doesn't, being active is good for your health.   Avoid " "alcohol, especially in the evening. Alcohol can make sleep apnea worse.  How is sleep apnea treated? -- Treatment can include:   Weight loss - As mentioned above, weight loss can help if you have excess weight or obesity. But losing weight can be challenging, and it takes time to lose enough weight to help with your sleep apnea. Most people need other treatment while they work on losing weight.   CPAP - The most effective treatment for sleep apnea is a device that keeps your airway open while you sleep. Treatment with this device is called \"continuous positive airway pressure\" (\"CPAP\"). People getting CPAP wear a face mask at night that keeps them breathing (figure 2).  If your doctor or nurse recommends a CPAP machine, be patient about using it. The mask might seem uncomfortable to wear at first, and the machine might seem noisy, but using the machine can really help you. People with sleep apnea who use a CPAP machine feel more rested and generally feel better.  If CPAP does not work, your doctor might suggest other treatment. Options might include:   An oral device - This is a device that you wear in your mouth. It is called an \"oral appliance\" or \"mandibular advancement device.\" It helps keep your airway open while you sleep.   Hypoglossal nerve stimulation - This involves a procedure to implant a small device into your chest. The device has a wire that connects to the nerve under your tongue. While you are sleeping, it sends an electrical signal that causes the tongue to push forward. This helps open up your airway.   Surgery to widen your airway - This might involve removing your tonsils or other tissue that blocks the airway.  Is sleep apnea dangerous? -- It can be. Risks include:   Accidents - People with sleep apnea do not get good-quality sleep, so they are often tired and not alert. This puts them at risk for car accidents and other types of accidents.   Other health problems - Studies show that people " with sleep apnea are more likely than others to have high blood pressure, heart attacks, and other serious heart problems. Some people also have mood changes or depression.  In people with severe sleep apnea, getting treated (for example, with CPAP) can help lower these risks.  All topics are updated as new evidence becomes available and our peer review process is complete.  This topic retrieved from [employer] on: Feb 28, 2024.  Topic 14947 Version 18.0  Release: 32.2.4 - C32.58  © 2024 [employer] and/or its affiliates. All rights reserved.  figure 1: Airway in a person with sleep apnea     Normally, when a person sleeps, the airway remains open, and air can pass from the nose and mouth to the lungs. In a person with sleep apnea, parts of the throat and mouth drop into the airway and block off the flow of air. This can cause loud snoring and interrupt breathing for short periods.  Graphic 60456 Version 6.0  figure 2: Continuous positive airway pressure (CPAP) for sleep apnea     The CPAP mask gently blows air into your nose while you sleep. It puts just enough pressure on your airway to keep it from closing. The mask in this picture fits over just the nose. Other CPAP devices have masks that fit over the nose and mouth.  Graphic 49566 Version 5.0  Consumer Information Use and Disclaimer   Disclaimer: This generalized information is a limited summary of diagnosis, treatment, and/or medication information. It is not meant to be comprehensive and should be used as a tool to help the user understand and/or assess potential diagnostic and treatment options. It does NOT include all information about conditions, treatments, medications, side effects, or risks that may apply to a specific patient. It is not intended to be medical advice or a substitute for the medical advice, diagnosis, or treatment of a health care provider based on the health care provider's examination and assessment of a patient's specific and unique  circumstances. Patients must speak with a health care provider for complete information about their health, medical questions, and treatment options, including any risks or benefits regarding use of medications. This information does not endorse any treatments or medications as safe, effective, or approved for treating a specific patient. [employer] and its affiliates disclaim any warranty or liability relating to this information or the use thereof.The use of this information is governed by the Terms of Use, available at https://www.[employer]am[employer].com/en/know/clinical-effectiveness-terms. 2024© [employer], [employer] and its affiliates and/or licensors. All rights reserved.  Copyright   © 2024 [employer] and/or its affiliates. All rights reserved.

## 2025-06-11 PROBLEM — Z11.4 SCREENING FOR HIV (HUMAN IMMUNODEFICIENCY VIRUS): Status: RESOLVED | Noted: 2025-05-12 | Resolved: 2025-06-11

## 2025-06-11 PROBLEM — Z11.59 NEED FOR HEPATITIS C SCREENING TEST: Status: RESOLVED | Noted: 2025-05-12 | Resolved: 2025-06-11

## 2025-07-06 DIAGNOSIS — R94.39 ABNORMAL NUCLEAR STRESS TEST: ICD-10-CM

## 2025-07-06 DIAGNOSIS — I10 ESSENTIAL (PRIMARY) HYPERTENSION: ICD-10-CM

## 2025-07-06 DIAGNOSIS — J45.30 MILD PERSISTENT ASTHMA WITHOUT COMPLICATION: ICD-10-CM

## 2025-07-06 DIAGNOSIS — E78.5 HYPERLIPIDEMIA, UNSPECIFIED HYPERLIPIDEMIA TYPE: ICD-10-CM

## 2025-07-06 DIAGNOSIS — R07.9 CHEST PAIN, UNSPECIFIED TYPE: ICD-10-CM

## 2025-07-06 RX ORDER — LOSARTAN POTASSIUM 50 MG/1
50 TABLET ORAL DAILY
Qty: 90 TABLET | Refills: 1 | Status: SHIPPED | OUTPATIENT
Start: 2025-07-06

## 2025-07-06 RX ORDER — ASPIRIN 81 MG/1
81 TABLET, CHEWABLE ORAL DAILY
Qty: 90 TABLET | Refills: 1 | Status: SHIPPED | OUTPATIENT
Start: 2025-07-06

## 2025-07-06 RX ORDER — METOPROLOL SUCCINATE 50 MG/1
50 TABLET, EXTENDED RELEASE ORAL DAILY
Qty: 90 TABLET | Refills: 1 | Status: SHIPPED | OUTPATIENT
Start: 2025-07-06

## 2025-07-06 RX ORDER — ATORVASTATIN CALCIUM 40 MG/1
40 TABLET, FILM COATED ORAL DAILY
Qty: 90 TABLET | Refills: 1 | Status: SHIPPED | OUTPATIENT
Start: 2025-07-06

## 2025-07-06 RX ORDER — ALBUTEROL SULFATE 90 UG/1
2 INHALANT RESPIRATORY (INHALATION) EVERY 6 HOURS PRN
Qty: 18 G | Refills: 1 | Status: SHIPPED | OUTPATIENT
Start: 2025-07-06

## (undated) DEVICE — PENCIL ELECTROSURG E-Z CLEAN -0035H

## (undated) DEVICE — 3M™ IOBAN™ 2 ANTIMICROBIAL INCISE DRAPE 6648EZ: Brand: IOBAN™ 2

## (undated) DEVICE — PRECISION THIN (7.0 X 0.38 X 15.0MM)

## (undated) DEVICE — UNDYED BRAIDED (POLYGLACTIN 910), SYNTHETIC ABSORBABLE SUTURE: Brand: COATED VICRYL

## (undated) DEVICE — SUT VICRYL 2-0 CT-2 27 IN J269H

## (undated) DEVICE — BETHLEHEM UNIVERSAL  MIONR EXT: Brand: CARDINAL HEALTH

## (undated) DEVICE — 3M™ DURAPORE™ SURGICAL TAPE 1538-1, 1 INCH X 10 YARD (2,5CM X 9,1M), 12 ROLLS/BOX: Brand: 3M™ DURAPORE™

## (undated) DEVICE — DRAPE C-ARM 48 X 84 IN F/ OEC MINIVIEW 6800

## (undated) DEVICE — OCCLUSIVE GAUZE STRIP,3% BISMUTH TRIBROMOPHENATE IN PETROLATUM BLEND: Brand: XEROFORM

## (undated) DEVICE — TONGUE DEPRESSOR STERILE

## (undated) DEVICE — INTENDED FOR TISSUE SEPARATION, AND OTHER PROCEDURES THAT REQUIRE A SHARP SURGICAL BLADE TO PUNCTURE OR CUT.: Brand: BARD-PARKER ® CARBON RIB-BACK BLADES

## (undated) DEVICE — COBAN 4 IN STERILE

## (undated) DEVICE — CUFF TOURNIQUET 30 X 4 IN QUICK CONNECT DISP 1BLA

## (undated) DEVICE — MICRO DUAL CUT (9.0 X 0.38 X 18.5MM)

## (undated) DEVICE — SPLINT 5 X 30 IN XFAST SET PLASTER

## (undated) DEVICE — GAUZE SPONGES,16 PLY: Brand: CURITY

## (undated) DEVICE — GLOVE INDICATOR PI UNDERGLOVE SZ 8 BLUE

## (undated) DEVICE — DRESSING XEROFORM 5 X 9

## (undated) DEVICE — PRECISION THIN (9.0 X 0.38 X 18.5MM)

## (undated) DEVICE — SUT VICRYL 4-0 PS-2 18 IN J496G

## (undated) DEVICE — SPONGE SCRUB 4 PCT CHLORHEXIDINE

## (undated) DEVICE — CURITY STRETCH BANDAGE: Brand: CURITY

## (undated) DEVICE — GLOVE SRG BIOGEL 8

## (undated) DEVICE — PAD CAST 4 IN COTTON NON STERILE

## (undated) DEVICE — INTENDED FOR TISSUE SEPARATION, AND OTHER PROCEDURES THAT REQUIRE A SHARP SURGICAL BLADE TO PUNCTURE OR CUT.: Brand: BARD-PARKER SAFETY BLADES SIZE 15, STERILE

## (undated) DEVICE — ABDOMINAL PAD: Brand: DERMACEA

## (undated) DEVICE — MICRO DUAL CUT (7.0 X 0.38 X 15.0MM)

## (undated) DEVICE — SUT ETHILON 3-0 PS-1 18 IN 1663H

## (undated) DEVICE — ACE WRAP 6 IN UNSTERILE

## (undated) DEVICE — CHLORAPREP HI-LITE 26ML ORANGE